# Patient Record
Sex: FEMALE | Race: WHITE | NOT HISPANIC OR LATINO
[De-identification: names, ages, dates, MRNs, and addresses within clinical notes are randomized per-mention and may not be internally consistent; named-entity substitution may affect disease eponyms.]

---

## 2017-09-28 ENCOUNTER — APPOINTMENT (OUTPATIENT)
Dept: DERMATOLOGY | Facility: CLINIC | Age: 62
End: 2017-09-28
Payer: COMMERCIAL

## 2017-09-28 PROCEDURE — 99213 OFFICE O/P EST LOW 20 MIN: CPT

## 2017-12-05 ENCOUNTER — APPOINTMENT (OUTPATIENT)
Dept: DERMATOLOGY | Facility: CLINIC | Age: 62
End: 2017-12-05

## 2018-01-23 ENCOUNTER — APPOINTMENT (OUTPATIENT)
Dept: PULMONOLOGY | Facility: CLINIC | Age: 63
End: 2018-01-23
Payer: COMMERCIAL

## 2018-01-23 VITALS
BODY MASS INDEX: 38.32 KG/M2 | OXYGEN SATURATION: 96 % | RESPIRATION RATE: 14 BRPM | SYSTOLIC BLOOD PRESSURE: 136 MMHG | HEIGHT: 65 IN | DIASTOLIC BLOOD PRESSURE: 80 MMHG | WEIGHT: 230 LBS | HEART RATE: 88 BPM

## 2018-01-23 DIAGNOSIS — N63.0 UNSPECIFIED LUMP IN UNSPECIFIED BREAST: ICD-10-CM

## 2018-01-23 DIAGNOSIS — Z87.19 PERSONAL HISTORY OF OTHER DISEASES OF THE DIGESTIVE SYSTEM: ICD-10-CM

## 2018-01-23 DIAGNOSIS — Z87.898 PERSONAL HISTORY OF OTHER SPECIFIED CONDITIONS: ICD-10-CM

## 2018-01-23 DIAGNOSIS — Z86.018 PERSONAL HISTORY OF OTHER BENIGN NEOPLASM: ICD-10-CM

## 2018-01-23 PROCEDURE — 85018 HEMOGLOBIN: CPT | Mod: QW

## 2018-01-23 PROCEDURE — 99204 OFFICE O/P NEW MOD 45 MIN: CPT | Mod: 25

## 2018-01-23 PROCEDURE — 94729 DIFFUSING CAPACITY: CPT

## 2018-01-23 PROCEDURE — 94010 BREATHING CAPACITY TEST: CPT

## 2018-01-23 PROCEDURE — 94727 GAS DIL/WSHOT DETER LNG VOL: CPT

## 2018-01-23 RX ORDER — AMOXICILLIN 500 MG/1
500 CAPSULE ORAL
Qty: 21 | Refills: 0 | Status: DISCONTINUED | COMMUNITY
Start: 2017-07-25

## 2018-01-23 RX ORDER — PREDNISONE 20 MG/1
20 TABLET ORAL
Qty: 11 | Refills: 0 | Status: DISCONTINUED | COMMUNITY
Start: 2018-01-13

## 2018-01-23 RX ORDER — CEFPODOXIME PROXETIL 200 MG/1
200 TABLET, FILM COATED ORAL
Qty: 20 | Refills: 0 | Status: DISCONTINUED | COMMUNITY
Start: 2017-10-25

## 2018-01-23 RX ORDER — CLINDAMYCIN HYDROCHLORIDE 300 MG/1
300 CAPSULE ORAL
Qty: 30 | Refills: 0 | Status: DISCONTINUED | COMMUNITY
Start: 2017-11-02

## 2018-01-23 RX ORDER — DIFLUNISAL 500 MG/1
500 TABLET, FILM COATED ORAL
Qty: 20 | Refills: 0 | Status: DISCONTINUED | COMMUNITY
Start: 2017-07-26

## 2018-01-23 RX ORDER — ALBUTEROL SULFATE 2.5 MG/3ML
(2.5 MG/3ML) SOLUTION RESPIRATORY (INHALATION)
Qty: 300 | Refills: 0 | Status: ACTIVE | COMMUNITY
Start: 2017-12-28

## 2018-01-23 RX ORDER — CLARITHROMYCIN 500 MG/1
500 TABLET, FILM COATED ORAL
Qty: 20 | Refills: 0 | Status: DISCONTINUED | COMMUNITY
Start: 2017-12-17

## 2018-01-23 RX ORDER — AZITHROMYCIN 500 MG/1
500 TABLET, FILM COATED ORAL
Qty: 7 | Refills: 0 | Status: DISCONTINUED | COMMUNITY
Start: 2017-12-20

## 2018-01-23 RX ORDER — VENLAFAXINE HYDROCHLORIDE 75 MG/1
75 CAPSULE, EXTENDED RELEASE ORAL
Refills: 0 | Status: ACTIVE | COMMUNITY
Start: 2018-01-23

## 2018-01-23 RX ORDER — OLMESARTAN MEDOXOMIL 20 MG/1
20 TABLET, FILM COATED ORAL
Refills: 0 | Status: ACTIVE | COMMUNITY
Start: 2018-01-23

## 2018-01-23 RX ORDER — MOXIFLOXACIN HYDROCHLORIDE TABLETS, 400 MG 400 MG/1
400 TABLET, FILM COATED ORAL
Qty: 10 | Refills: 0 | Status: DISCONTINUED | COMMUNITY
Start: 2017-12-28

## 2018-01-23 RX ORDER — AMOXICILLIN AND CLAVULANATE POTASSIUM 875; 125 MG/1; MG/1
875-125 TABLET, COATED ORAL
Qty: 20 | Refills: 0 | Status: DISCONTINUED | COMMUNITY
Start: 2018-01-03

## 2018-01-23 RX ORDER — FLUCONAZOLE 100 MG/1
100 TABLET ORAL
Qty: 6 | Refills: 0 | Status: DISCONTINUED | COMMUNITY
Start: 2018-01-03

## 2018-01-23 RX ORDER — METHYLPREDNISOLONE 4 MG/1
4 TABLET ORAL
Qty: 21 | Refills: 0 | Status: DISCONTINUED | COMMUNITY
Start: 2017-11-02

## 2018-01-23 RX ORDER — FLUCONAZOLE 150 MG/1
150 TABLET ORAL
Qty: 2 | Refills: 0 | Status: DISCONTINUED | COMMUNITY
Start: 2017-11-03

## 2018-02-27 ENCOUNTER — OUTPATIENT (OUTPATIENT)
Dept: OUTPATIENT SERVICES | Facility: HOSPITAL | Age: 63
LOS: 1 days | End: 2018-02-27
Payer: COMMERCIAL

## 2018-02-27 DIAGNOSIS — Z90.710 ACQUIRED ABSENCE OF BOTH CERVIX AND UTERUS: Chronic | ICD-10-CM

## 2018-02-27 DIAGNOSIS — Z98.89 OTHER SPECIFIED POSTPROCEDURAL STATES: Chronic | ICD-10-CM

## 2018-02-27 DIAGNOSIS — Z90.49 ACQUIRED ABSENCE OF OTHER SPECIFIED PARTS OF DIGESTIVE TRACT: Chronic | ICD-10-CM

## 2018-02-27 DIAGNOSIS — G47.33 OBSTRUCTIVE SLEEP APNEA (ADULT) (PEDIATRIC): ICD-10-CM

## 2018-02-27 PROCEDURE — 95806 SLEEP STUDY UNATT&RESP EFFT: CPT | Mod: 26

## 2018-02-27 PROCEDURE — G0399: CPT

## 2018-02-27 PROCEDURE — 95806 SLEEP STUDY UNATT&RESP EFFT: CPT

## 2018-03-13 ENCOUNTER — RX RENEWAL (OUTPATIENT)
Age: 63
End: 2018-03-13

## 2018-04-11 ENCOUNTER — APPOINTMENT (OUTPATIENT)
Dept: PULMONOLOGY | Facility: CLINIC | Age: 63
End: 2018-04-11
Payer: COMMERCIAL

## 2018-04-11 VITALS
BODY MASS INDEX: 38.11 KG/M2 | OXYGEN SATURATION: 96 % | SYSTOLIC BLOOD PRESSURE: 138 MMHG | DIASTOLIC BLOOD PRESSURE: 84 MMHG | HEART RATE: 77 BPM | WEIGHT: 229 LBS

## 2018-04-11 PROCEDURE — 99214 OFFICE O/P EST MOD 30 MIN: CPT

## 2018-04-11 RX ORDER — PREDNISONE 10 MG/1
10 TABLET ORAL DAILY
Qty: 100 | Refills: 0 | Status: DISCONTINUED | COMMUNITY
Start: 2018-01-23 | End: 2018-04-11

## 2018-04-11 RX ORDER — PREDNISONE 10 MG/1
10 TABLET ORAL
Qty: 34 | Refills: 0 | Status: DISCONTINUED | COMMUNITY
Start: 2017-12-22 | End: 2018-04-11

## 2018-09-26 ENCOUNTER — OUTPATIENT (OUTPATIENT)
Dept: OUTPATIENT SERVICES | Facility: HOSPITAL | Age: 63
LOS: 1 days | End: 2018-09-26
Payer: COMMERCIAL

## 2018-09-26 DIAGNOSIS — G47.33 OBSTRUCTIVE SLEEP APNEA (ADULT) (PEDIATRIC): ICD-10-CM

## 2018-09-26 DIAGNOSIS — Z90.49 ACQUIRED ABSENCE OF OTHER SPECIFIED PARTS OF DIGESTIVE TRACT: Chronic | ICD-10-CM

## 2018-09-26 DIAGNOSIS — Z90.710 ACQUIRED ABSENCE OF BOTH CERVIX AND UTERUS: Chronic | ICD-10-CM

## 2018-09-26 DIAGNOSIS — Z98.89 OTHER SPECIFIED POSTPROCEDURAL STATES: Chronic | ICD-10-CM

## 2018-09-26 PROCEDURE — 95811 POLYSOM 6/>YRS CPAP 4/> PARM: CPT

## 2018-09-26 PROCEDURE — 95811 POLYSOM 6/>YRS CPAP 4/> PARM: CPT | Mod: 26

## 2019-01-22 ENCOUNTER — APPOINTMENT (OUTPATIENT)
Dept: DERMATOLOGY | Facility: CLINIC | Age: 64
End: 2019-01-22
Payer: COMMERCIAL

## 2019-01-22 PROCEDURE — 99214 OFFICE O/P EST MOD 30 MIN: CPT | Mod: 25

## 2019-01-22 PROCEDURE — 17000 DESTRUCT PREMALG LESION: CPT

## 2019-01-28 ENCOUNTER — APPOINTMENT (OUTPATIENT)
Dept: PULMONOLOGY | Facility: CLINIC | Age: 64
End: 2019-01-28
Payer: COMMERCIAL

## 2019-01-28 VITALS
DIASTOLIC BLOOD PRESSURE: 76 MMHG | WEIGHT: 210 LBS | OXYGEN SATURATION: 97 % | HEIGHT: 65 IN | SYSTOLIC BLOOD PRESSURE: 124 MMHG | HEART RATE: 74 BPM | BODY MASS INDEX: 34.99 KG/M2

## 2019-01-28 DIAGNOSIS — K21.9 GASTRO-ESOPHAGEAL REFLUX DISEASE W/OUT ESOPHAGITIS: ICD-10-CM

## 2019-01-28 PROCEDURE — 99214 OFFICE O/P EST MOD 30 MIN: CPT

## 2019-01-28 RX ORDER — OMEPRAZOLE MAGNESIUM 20 MG/1
20 CAPSULE, DELAYED RELEASE ORAL
Refills: 0 | Status: ACTIVE | COMMUNITY

## 2019-01-28 NOTE — REASON FOR VISIT
[Follow-Up] : a follow-up visit [Shortness of Breath] : shortness of Breath [Sleep Apnea] : sleep apnea

## 2019-01-28 NOTE — HISTORY OF PRESENT ILLNESS
[Excessive Daytime Sleepiness] : excessive daytime sleepiness [Snoring] : snoring [Sleepy When Sedentary] : sleepy when sedentary [None] : The patient is not currently being treated for this problem [Follow-Up - Routine Clinic] : a routine clinic follow-up of [Excess Weight] : excess weight [Currently Experiencing] : The patient is currently experiencing symptoms. [Dyspnea] : dyspnea [Low Calorie Diet] : low calorie diet [Fair Compliance] : fair compliance with treatment [Fair Tolerance] : fair tolerance of treatment [Poor Symptom Control] : poor symptom control [Diabetes] : diabetes [Hypertension] : hypertension [High] : high [Low Calorie] : low calorie [Well Balanced Diet] : well balanced meals [Infrequently] : exercises infrequently [Walking] : walking [FreeTextEntry1] : She reports a cough treated with multiple abx, cough medicine, Singulair, Breo, Protonix, Flonase. She reports some improvement but without complete resolution. She had a sinus CT which revealed a deviated nasal septum and maxillary sinusitis. Chest CT was reported clear.\par Patient reports being on Pepcid for possible reflux and reports a significantly improved cough. She reports residual PNDS but without significant other symptoms. [Witnessed Apnea During Sleep] : no witnessed apnea during sleep [Witnessed Gasping During Sleep] : no witnessed gasping during sleep [Unrefreshing Sleep] : no unrefreshing sleep [Morning Headaches] : no morning headaches

## 2019-01-28 NOTE — REVIEW OF SYSTEMS
[Postnasal Drip] : postnasal drip [Sinus Problems] : sinus problems [Cough] : cough [Sputum] : sputum  [Hypertension] : ~T hypertension [Heartburn] : heartburn [Diabetes] : diabetes mellitus [Snoring] : snoring [Fever] : no fever [Chills] : no chills [Dry Eyes] : no dryness of the eyes [Eye Irritation] : no ~T irritation of the eyes [Nasal Congestion] : no nasal congestion [Epistaxis] : no nosebleeds [Dyspnea] : no dyspnea [Chest Tightness] : no chest tightness [Pleuritic Pain] : no pleuritic pain [Wheezing] : no wheezing [Chest Discomfort] : no chest discomfort [Dysrhythmia] : no dysrhythmia [Murmurs] : no murmurs were heard [Palpitations] : no palpitations [Edema] : ~T edema was not present [Hay Fever] : no hay fever [Itchy Eyes] : no itching of ~T the eyes [Reflux] : no reflux [Nausea] : no nausea [Vomiting] : no vomiting [Constipation] : no constipation [Diarrhea] : no diarrhea [Abdominal Pain] : no abdominal pain [Dysuria] : no dysuria [Trauma] : no ~T physical trauma [Fracture] : no fracture [Anemia] : no anemia [Headache] : no headache [Dizziness] : no dizziness [Syncope] : no fainting [Numbness] : no numbness [Paralysis] : no paralysis was seen [Seizures] : no seizures [Depression] : no depression [Anxiety] : no anxiety [Thyroid Problem] : no thyroid problem [Witnessed Apneas] : demonstrated no ~M apnea [Nonrestorative Sleep] : restorative sleep

## 2019-01-28 NOTE — DISCUSSION/SUMMARY
[FreeTextEntry1] : \par #1. PFTs performed previously was essentially normal.\par #2. SOBOE is likely related to weight or deconditioning given normal PFTs\par #3. The patient does not appear to require BD therapy at this time\par #4. Diet and exercise for weight loss\par #5. CPAP titration study for moderate KATIUSKA\par #6. Cough improved with Zantac for GERD\par #7. Flonase for PNDS which could be contributing to her cough\par #8. F/u one month after starting CPAP therapy

## 2019-01-28 NOTE — CONSULT LETTER
[Dear  ___] : Dear  [unfilled], [Consult Letter:] : I had the pleasure of evaluating your patient, [unfilled]. [Please see my note below.] : Please see my note below. [Consult Closing:] : Thank you very much for allowing me to participate in the care of this patient.  If you have any questions, please do not hesitate to contact me. [Sincerely,] : Sincerely, [DrHeide  ___] : Dr. MG [Fawad Diaz MD] : Fawad Diaz MD [FreeTextEntry3] : Fawad Diaz MD, FCCP, PAMELA. ABSM\par

## 2019-01-28 NOTE — PHYSICAL EXAM
[General Appearance - Well Developed] : well developed [Normal Appearance] : normal appearance [General Appearance - In No Acute Distress] : no acute distress [Normal Conjunctiva] : the conjunctiva exhibited no abnormalities [Low Lying Soft Palate] : low lying soft palate [Enlarged Base of the Tongue] : enlargement of the base of the tongue [II] : II [Neck Appearance] : the appearance of the neck was normal [Heart Rate And Rhythm] : heart rate and rhythm were normal [Heart Sounds] : normal S1 and S2 [Murmurs] : no murmurs present [Edema] : no peripheral edema present [] : no respiratory distress [Respiration, Rhythm And Depth] : normal respiratory rhythm and effort [Exaggerated Use Of Accessory Muscles For Inspiration] : no accessory muscle use [Auscultation Breath Sounds / Voice Sounds] : lungs were clear to auscultation bilaterally [Abdomen Soft] : soft [Abdomen Tenderness] : non-tender [Abnormal Walk] : normal gait [Nail Clubbing] : no clubbing of the fingernails [Cyanosis, Localized] : no localized cyanosis [No Focal Deficits] : no focal deficits [Oriented To Time, Place, And Person] : oriented to person, place, and time [Elongated Uvula] : no elongated uvula [FreeTextEntry1] : No abnormalities.

## 2019-02-04 ENCOUNTER — APPOINTMENT (OUTPATIENT)
Dept: PULMONOLOGY | Facility: CLINIC | Age: 64
End: 2019-02-04

## 2019-03-05 ENCOUNTER — APPOINTMENT (OUTPATIENT)
Dept: SURGERY | Facility: CLINIC | Age: 64
End: 2019-03-05
Payer: COMMERCIAL

## 2019-03-05 VITALS
HEIGHT: 66 IN | SYSTOLIC BLOOD PRESSURE: 141 MMHG | BODY MASS INDEX: 33.75 KG/M2 | HEART RATE: 88 BPM | WEIGHT: 210 LBS | DIASTOLIC BLOOD PRESSURE: 90 MMHG

## 2019-03-05 PROCEDURE — 99214 OFFICE O/P EST MOD 30 MIN: CPT

## 2019-03-07 NOTE — HISTORY OF PRESENT ILLNESS
[de-identified] : Presents today with concern of increasing bulge in the midepigastrium. Concerned of recurrent herniation.

## 2019-03-07 NOTE — PHYSICAL EXAM
[Normal Breath Sounds] : Normal breath sounds [Normal Heart Sounds] : normal heart sounds [Normal Rate and Rhythm] : normal rate and rhythm [No Rash or Lesion] : No rash or lesion [Alert] : alert [Oriented to Person] : oriented to person [Oriented to Place] : oriented to place [Oriented to Time] : oriented to time [Calm] : calm [de-identified] : Healthy-appearing 58-year-old woman in no acute distress. [de-identified] : NCAT, PERRLA, EOMI. Oral and pharyngeal mucosa pink and moist [de-identified] : Supple, no masses, no lymphadenopathy, no jugular venous distention. [de-identified] : Soft, nontender nondistended, positive bowel sounds in all four quads.  No hernia or masses. No rebound or guarding.\par Moderate epigastric diastasis. [de-identified] : FROM, strength equal bilaterally ambulating without difficulty.

## 2019-03-07 NOTE — ASSESSMENT
[FreeTextEntry1] : Increased bowing of mid epigastrium.  Appears to be enlarging diastasis.   No obvious signs of hernia or mass.  \par Will obtain CT to r/o recurrent ventral hernia.\par F/u upon completion of CT scan as needed.  Will call to discuss results.

## 2019-03-11 ENCOUNTER — APPOINTMENT (OUTPATIENT)
Dept: PULMONOLOGY | Facility: CLINIC | Age: 64
End: 2019-03-11
Payer: COMMERCIAL

## 2019-03-11 VITALS
HEART RATE: 67 BPM | SYSTOLIC BLOOD PRESSURE: 122 MMHG | BODY MASS INDEX: 34.38 KG/M2 | OXYGEN SATURATION: 98 % | WEIGHT: 213 LBS | DIASTOLIC BLOOD PRESSURE: 82 MMHG

## 2019-03-11 DIAGNOSIS — R09.82 POSTNASAL DRIP: ICD-10-CM

## 2019-03-11 DIAGNOSIS — R06.02 SHORTNESS OF BREATH: ICD-10-CM

## 2019-03-11 PROCEDURE — 99214 OFFICE O/P EST MOD 30 MIN: CPT

## 2019-03-11 RX ORDER — TERCONAZOLE 4 MG/G
0.4 CREAM VAGINAL
Qty: 45 | Refills: 0 | Status: DISCONTINUED | COMMUNITY
Start: 2017-12-22 | End: 2019-03-11

## 2019-03-11 RX ORDER — MONTELUKAST 10 MG/1
10 TABLET, FILM COATED ORAL
Qty: 30 | Refills: 0 | Status: DISCONTINUED | COMMUNITY
Start: 2017-12-20 | End: 2019-03-11

## 2019-03-11 RX ORDER — HYDROCODONE POLISTIREX AND CHLORPHENIRAMINE POLISTIREX 10; 8 MG/5ML; MG/5ML
10-8 SUSPENSION, EXTENDED RELEASE ORAL
Qty: 300 | Refills: 0 | Status: DISCONTINUED | COMMUNITY
Start: 2017-12-19 | End: 2019-03-11

## 2019-03-11 RX ORDER — FLUTICASONE PROPIONATE 50 UG/1
50 SPRAY, METERED NASAL
Qty: 16 | Refills: 0 | Status: DISCONTINUED | COMMUNITY
Start: 2018-01-13 | End: 2019-03-11

## 2019-03-11 RX ORDER — ALBUTEROL SULFATE 90 UG/1
108 (90 BASE) AEROSOL, METERED RESPIRATORY (INHALATION)
Qty: 9 | Refills: 0 | Status: DISCONTINUED | COMMUNITY
Start: 2017-12-20 | End: 2019-03-11

## 2019-03-11 RX ORDER — CIPROFLOXACIN AND DEXAMETHASONE 3; 1 MG/ML; MG/ML
0.3-0.1 SUSPENSION/ DROPS AURICULAR (OTIC)
Qty: 8 | Refills: 0 | Status: DISCONTINUED | COMMUNITY
Start: 2017-12-28 | End: 2019-03-11

## 2019-03-11 RX ORDER — BENZONATATE 100 MG/1
100 CAPSULE ORAL
Qty: 30 | Refills: 0 | Status: DISCONTINUED | COMMUNITY
Start: 2017-12-19 | End: 2019-03-11

## 2019-03-11 RX ORDER — EMPAGLIFLOZIN 25 MG/1
25 TABLET, FILM COATED ORAL
Qty: 30 | Refills: 0 | Status: DISCONTINUED | COMMUNITY
Start: 2018-01-13 | End: 2019-03-11

## 2019-03-11 NOTE — DISCUSSION/SUMMARY
[FreeTextEntry1] : \par #1. PFTs performed previously was essentially normal.\par #2. SOBOE is likely related to weight or deconditioning given normal PFTs\par #3. The patient does not appear to require BD therapy at this time\par #4. Diet and exercise for weight loss\par #5. CPAP at 8 cm of water to treat moderate KATIUSKA\par #6. Cough improved with Zantac for GERD\par #7. Flonase for PNDS which could be contributing to her cough\par #8. F/u one month to reassess response to new mask; ordered AirFit P20 and gave P10 to pt

## 2019-03-18 ENCOUNTER — INPATIENT (INPATIENT)
Facility: HOSPITAL | Age: 64
LOS: 13 days | Discharge: HOME | DRG: 330 | End: 2019-04-01
Attending: SURGERY | Admitting: SURGERY
Payer: COMMERCIAL

## 2019-03-18 VITALS
HEIGHT: 67 IN | TEMPERATURE: 98 F | DIASTOLIC BLOOD PRESSURE: 92 MMHG | RESPIRATION RATE: 20 BRPM | SYSTOLIC BLOOD PRESSURE: 158 MMHG | WEIGHT: 210.1 LBS | OXYGEN SATURATION: 99 % | HEART RATE: 82 BPM

## 2019-03-18 DIAGNOSIS — Z98.89 OTHER SPECIFIED POSTPROCEDURAL STATES: Chronic | ICD-10-CM

## 2019-03-18 DIAGNOSIS — Z90.710 ACQUIRED ABSENCE OF BOTH CERVIX AND UTERUS: Chronic | ICD-10-CM

## 2019-03-18 DIAGNOSIS — Z90.49 ACQUIRED ABSENCE OF OTHER SPECIFIED PARTS OF DIGESTIVE TRACT: Chronic | ICD-10-CM

## 2019-03-18 LAB
ALBUMIN SERPL ELPH-MCNC: 4.3 G/DL — SIGNIFICANT CHANGE UP (ref 3.3–5.2)
ALP SERPL-CCNC: 102 U/L — SIGNIFICANT CHANGE UP (ref 40–120)
ALT FLD-CCNC: 18 U/L — SIGNIFICANT CHANGE UP
ANION GAP SERPL CALC-SCNC: 22 MMOL/L — HIGH (ref 5–17)
APTT BLD: 35.1 SEC — SIGNIFICANT CHANGE UP (ref 27.5–36.3)
AST SERPL-CCNC: 21 U/L — SIGNIFICANT CHANGE UP
BASOPHILS # BLD AUTO: 0 K/UL — SIGNIFICANT CHANGE UP (ref 0–0.2)
BASOPHILS NFR BLD AUTO: 0.2 % — SIGNIFICANT CHANGE UP (ref 0–2)
BILIRUB SERPL-MCNC: 0.5 MG/DL — SIGNIFICANT CHANGE UP (ref 0.4–2)
BUN SERPL-MCNC: 16 MG/DL — SIGNIFICANT CHANGE UP (ref 8–20)
CALCIUM SERPL-MCNC: 10.3 MG/DL — HIGH (ref 8.6–10.2)
CHLORIDE SERPL-SCNC: 100 MMOL/L — SIGNIFICANT CHANGE UP (ref 98–107)
CO2 SERPL-SCNC: 18 MMOL/L — LOW (ref 22–29)
CREAT SERPL-MCNC: 0.76 MG/DL — SIGNIFICANT CHANGE UP (ref 0.5–1.3)
EOSINOPHIL # BLD AUTO: 0 K/UL — SIGNIFICANT CHANGE UP (ref 0–0.5)
EOSINOPHIL NFR BLD AUTO: 0.2 % — SIGNIFICANT CHANGE UP (ref 0–6)
GLUCOSE SERPL-MCNC: 211 MG/DL — HIGH (ref 70–115)
HCT VFR BLD CALC: 47.3 % — HIGH (ref 37–47)
HGB BLD-MCNC: 16.2 G/DL — HIGH (ref 12–16)
INR BLD: 0.94 RATIO — SIGNIFICANT CHANGE UP (ref 0.88–1.16)
LIDOCAIN IGE QN: 23 U/L — SIGNIFICANT CHANGE UP (ref 22–51)
LYMPHOCYTES # BLD AUTO: 1.8 K/UL — SIGNIFICANT CHANGE UP (ref 1–4.8)
LYMPHOCYTES # BLD AUTO: 11.3 % — LOW (ref 20–55)
MCHC RBC-ENTMCNC: 30.2 PG — SIGNIFICANT CHANGE UP (ref 27–31)
MCHC RBC-ENTMCNC: 34.2 G/DL — SIGNIFICANT CHANGE UP (ref 32–36)
MCV RBC AUTO: 88.2 FL — SIGNIFICANT CHANGE UP (ref 81–99)
MONOCYTES # BLD AUTO: 0.8 K/UL — SIGNIFICANT CHANGE UP (ref 0–0.8)
MONOCYTES NFR BLD AUTO: 5 % — SIGNIFICANT CHANGE UP (ref 3–10)
NEUTROPHILS # BLD AUTO: 13.3 K/UL — HIGH (ref 1.8–8)
NEUTROPHILS NFR BLD AUTO: 82.9 % — HIGH (ref 37–73)
PLATELET # BLD AUTO: 410 K/UL — HIGH (ref 150–400)
POTASSIUM SERPL-MCNC: 3.8 MMOL/L — SIGNIFICANT CHANGE UP (ref 3.5–5.3)
POTASSIUM SERPL-SCNC: 3.8 MMOL/L — SIGNIFICANT CHANGE UP (ref 3.5–5.3)
PROT SERPL-MCNC: 8.4 G/DL — SIGNIFICANT CHANGE UP (ref 6.6–8.7)
PROTHROM AB SERPL-ACNC: 10.8 SEC — SIGNIFICANT CHANGE UP (ref 10–12.9)
RBC # BLD: 5.36 M/UL — HIGH (ref 4.4–5.2)
RBC # FLD: 13.9 % — SIGNIFICANT CHANGE UP (ref 11–15.6)
SODIUM SERPL-SCNC: 140 MMOL/L — SIGNIFICANT CHANGE UP (ref 135–145)
TYPE + AB SCN PNL BLD: SIGNIFICANT CHANGE UP
WBC # BLD: 16.1 K/UL — HIGH (ref 4.8–10.8)
WBC # FLD AUTO: 16.1 K/UL — HIGH (ref 4.8–10.8)

## 2019-03-18 PROCEDURE — 99285 EMERGENCY DEPT VISIT HI MDM: CPT

## 2019-03-18 PROCEDURE — 74177 CT ABD & PELVIS W/CONTRAST: CPT | Mod: 26

## 2019-03-18 PROCEDURE — 93010 ELECTROCARDIOGRAM REPORT: CPT

## 2019-03-18 RX ORDER — SODIUM CHLORIDE 9 MG/ML
3 INJECTION INTRAMUSCULAR; INTRAVENOUS; SUBCUTANEOUS ONCE
Qty: 0 | Refills: 0 | Status: COMPLETED | OUTPATIENT
Start: 2019-03-18 | End: 2019-03-18

## 2019-03-18 RX ORDER — ONDANSETRON 8 MG/1
4 TABLET, FILM COATED ORAL ONCE
Qty: 0 | Refills: 0 | Status: COMPLETED | OUTPATIENT
Start: 2019-03-18 | End: 2019-03-18

## 2019-03-18 RX ORDER — HYDROMORPHONE HYDROCHLORIDE 2 MG/ML
0.5 INJECTION INTRAMUSCULAR; INTRAVENOUS; SUBCUTANEOUS ONCE
Qty: 0 | Refills: 0 | Status: DISCONTINUED | OUTPATIENT
Start: 2019-03-18 | End: 2019-03-18

## 2019-03-18 RX ORDER — SODIUM CHLORIDE 9 MG/ML
1000 INJECTION INTRAMUSCULAR; INTRAVENOUS; SUBCUTANEOUS ONCE
Qty: 0 | Refills: 0 | Status: COMPLETED | OUTPATIENT
Start: 2019-03-18 | End: 2019-03-18

## 2019-03-18 RX ADMIN — SODIUM CHLORIDE 1000 MILLILITER(S): 9 INJECTION INTRAMUSCULAR; INTRAVENOUS; SUBCUTANEOUS at 21:01

## 2019-03-18 RX ADMIN — SODIUM CHLORIDE 1000 MILLILITER(S): 9 INJECTION INTRAMUSCULAR; INTRAVENOUS; SUBCUTANEOUS at 22:21

## 2019-03-18 RX ADMIN — ONDANSETRON 4 MILLIGRAM(S): 8 TABLET, FILM COATED ORAL at 22:52

## 2019-03-18 RX ADMIN — HYDROMORPHONE HYDROCHLORIDE 0.5 MILLIGRAM(S): 2 INJECTION INTRAMUSCULAR; INTRAVENOUS; SUBCUTANEOUS at 21:21

## 2019-03-18 RX ADMIN — SODIUM CHLORIDE 3 MILLILITER(S): 9 INJECTION INTRAMUSCULAR; INTRAVENOUS; SUBCUTANEOUS at 22:43

## 2019-03-18 RX ADMIN — ONDANSETRON 4 MILLIGRAM(S): 8 TABLET, FILM COATED ORAL at 21:01

## 2019-03-18 RX ADMIN — HYDROMORPHONE HYDROCHLORIDE 0.5 MILLIGRAM(S): 2 INJECTION INTRAMUSCULAR; INTRAVENOUS; SUBCUTANEOUS at 21:01

## 2019-03-18 RX ADMIN — HYDROMORPHONE HYDROCHLORIDE 0.5 MILLIGRAM(S): 2 INJECTION INTRAMUSCULAR; INTRAVENOUS; SUBCUTANEOUS at 22:48

## 2019-03-18 NOTE — ED PROVIDER NOTE - CLINICAL SUMMARY MEDICAL DECISION MAKING FREE TEXT BOX
pt with diffuse abd pain, n/v.  will check labs, ct, cxr, ekg, give pain medication, ivf, zofran and reassess.

## 2019-03-18 NOTE — ED ADULT NURSE NOTE - NSIMPLEMENTINTERV_GEN_ALL_ED
Implemented All Universal Safety Interventions:  [place] to call system. Call bell, personal items and telephone within reach. Instruct patient to call for assistance. Room bathroom lighting operational. Non-slip footwear when patient is off stretcher. Physically safe environment: no spills, clutter or unnecessary equipment. Stretcher in lowest position, wheels locked, appropriate side rails in place.

## 2019-03-18 NOTE — ED ADULT NURSE NOTE - OBJECTIVE STATEMENT
Assumed care of patient at 2100, alert and oriented x4, Pt vomiting, c/o 10/10 lower abdominal pain since 10 am this am. Pt diaphoretic, EKG being done at bedside. Pt reports she felt like she was constipated so she took metamucil this am and has been vomiting. Denies diarrhea, denies chest pains. Abdomen soft, slightly distended, c/o pain to lower abdomen. IV placed labs sent medications given for pain with relief Assumed care of patient at 2100, alert and oriented x4, Pt vomiting, c/o 10/10 lower abdominal pain since 10 am this am. Pt diaphoretic, EKG being done at bedside. Pt reports she felt like she was constipated so she took metamucil this am and has been vomiting. Denies diarrhea, denies chest pains. Abdomen soft, slightly distended, c/o pain to lower abdomen. IV placed labs sent medications given for pain with relief, pt educated on plan of care, pt able to successfully teach back plan of care to RN, RN will continue to reeducate pt during hospital stay.

## 2019-03-18 NOTE — ED PROVIDER NOTE - OBJECTIVE STATEMENT
Pt is a 64 yo F co abd pain and vomiting. PMHx significant for DM, hld and multiple abdominal surgeries.  Pt states that this morning at 10 am she had onset of diffuse abdominal pain. pt states that she took metamucil and after that the pain became worse and she had many episodes of vomiting. Pt states that she has been unable to keep anything down at all. pt states that she had a normal BM this morning but nothing since then and is not passing flatus. no fever/chills. no cough. no sob. no other complaints.

## 2019-03-18 NOTE — ED PROVIDER NOTE - NS ED ROS FT
No fever/chills, No photophobia/eye pain/changes in vision, No ear pain/sore throat/dysphagia, No chest pain/palpitations, no SOB/cough/wheeze/stridor, +abdominal pain, No Diarrhea, no dysuria/frequency/discharge, No neck/back pain, no rash, no changes in neurological status/function.  +n/v

## 2019-03-18 NOTE — ED ADULT NURSE NOTE - PSH
S/P breast lumpectomy  (Benign) right breast 2000  S/P exploratory laparotomy  s/p tummy tuck for post op bleeding 2014  S/P hernia repair  "Temporary ventral hernia surgery" (1/5/15)  S/P total hysterectomy with removal of both tubes and ovaries  March 2013  Status post abdominoplasty  with hernia repair April 2014  Status post cholecystectomy  01/05/2015

## 2019-03-18 NOTE — ED PROVIDER NOTE - PHYSICAL EXAMINATION
Constitutional - well-developed; well nourished. moderate distress 2/2 pain. Head - NCAT. Airway patent. Eyes - PERRL. CV - RRR. no murmur. no edema. Pulm - CTAB. Abd - soft, diffuse ttp. no rebound. no guarding. Neuro - A&Ox3. strength 5/5 x4. sensation intact x4. normal gait. Skin - No rash. MSK - normal ROM.

## 2019-03-19 DIAGNOSIS — K56.609 UNSPECIFIED INTESTINAL OBSTRUCTION, UNSPECIFIED AS TO PARTIAL VERSUS COMPLETE OBSTRUCTION: ICD-10-CM

## 2019-03-19 LAB
ACETONE SERPL-MCNC: ABNORMAL
ANION GAP SERPL CALC-SCNC: 18 MMOL/L — HIGH (ref 5–17)
ANION GAP SERPL CALC-SCNC: 18 MMOL/L — HIGH (ref 5–17)
APPEARANCE UR: CLEAR — SIGNIFICANT CHANGE UP
BASOPHILS # BLD AUTO: 0 K/UL — SIGNIFICANT CHANGE UP (ref 0–0.2)
BASOPHILS NFR BLD AUTO: 0.1 % — SIGNIFICANT CHANGE UP (ref 0–2)
BILIRUB UR-MCNC: NEGATIVE — SIGNIFICANT CHANGE UP
BUN SERPL-MCNC: 20 MG/DL — SIGNIFICANT CHANGE UP (ref 8–20)
BUN SERPL-MCNC: 22 MG/DL — HIGH (ref 8–20)
CALCIUM SERPL-MCNC: 9.5 MG/DL — SIGNIFICANT CHANGE UP (ref 8.6–10.2)
CALCIUM SERPL-MCNC: 9.7 MG/DL — SIGNIFICANT CHANGE UP (ref 8.6–10.2)
CHLORIDE SERPL-SCNC: 101 MMOL/L — SIGNIFICANT CHANGE UP (ref 98–107)
CHLORIDE SERPL-SCNC: 102 MMOL/L — SIGNIFICANT CHANGE UP (ref 98–107)
CO2 SERPL-SCNC: 22 MMOL/L — SIGNIFICANT CHANGE UP (ref 22–29)
CO2 SERPL-SCNC: 23 MMOL/L — SIGNIFICANT CHANGE UP (ref 22–29)
COLOR SPEC: YELLOW — SIGNIFICANT CHANGE UP
CREAT SERPL-MCNC: 0.68 MG/DL — SIGNIFICANT CHANGE UP (ref 0.5–1.3)
CREAT SERPL-MCNC: 0.76 MG/DL — SIGNIFICANT CHANGE UP (ref 0.5–1.3)
DIFF PNL FLD: NEGATIVE — SIGNIFICANT CHANGE UP
EOSINOPHIL # BLD AUTO: 0 K/UL — SIGNIFICANT CHANGE UP (ref 0–0.5)
EOSINOPHIL NFR BLD AUTO: 0.1 % — SIGNIFICANT CHANGE UP (ref 0–6)
GLUCOSE BLDC GLUCOMTR-MCNC: 149 MG/DL — HIGH (ref 70–99)
GLUCOSE BLDC GLUCOMTR-MCNC: 162 MG/DL — HIGH (ref 70–99)
GLUCOSE BLDC GLUCOMTR-MCNC: 237 MG/DL — HIGH (ref 70–99)
GLUCOSE SERPL-MCNC: 177 MG/DL — HIGH (ref 70–115)
GLUCOSE SERPL-MCNC: 217 MG/DL — HIGH (ref 70–115)
GLUCOSE UR QL: 1000 MG/DL
HCT VFR BLD CALC: 47.2 % — HIGH (ref 37–47)
HGB BLD-MCNC: 15.9 G/DL — SIGNIFICANT CHANGE UP (ref 12–16)
KETONES UR-MCNC: ABNORMAL
LACTATE SERPL-SCNC: 1.7 MMOL/L — SIGNIFICANT CHANGE UP (ref 0.5–2)
LACTATE SERPL-SCNC: 2.2 MMOL/L — HIGH (ref 0.5–2)
LEUKOCYTE ESTERASE UR-ACNC: ABNORMAL
LYMPHOCYTES # BLD AUTO: 1.4 K/UL — SIGNIFICANT CHANGE UP (ref 1–4.8)
LYMPHOCYTES # BLD AUTO: 11.6 % — LOW (ref 20–55)
MAGNESIUM SERPL-MCNC: 2.1 MG/DL — SIGNIFICANT CHANGE UP (ref 1.6–2.6)
MCHC RBC-ENTMCNC: 30.2 PG — SIGNIFICANT CHANGE UP (ref 27–31)
MCHC RBC-ENTMCNC: 33.7 G/DL — SIGNIFICANT CHANGE UP (ref 32–36)
MCV RBC AUTO: 89.6 FL — SIGNIFICANT CHANGE UP (ref 81–99)
MONOCYTES # BLD AUTO: 1.2 K/UL — HIGH (ref 0–0.8)
MONOCYTES NFR BLD AUTO: 10.4 % — HIGH (ref 3–10)
NEUTROPHILS # BLD AUTO: 9.1 K/UL — HIGH (ref 1.8–8)
NEUTROPHILS NFR BLD AUTO: 77.7 % — HIGH (ref 37–73)
NITRITE UR-MCNC: NEGATIVE — SIGNIFICANT CHANGE UP
PH UR: 5 — SIGNIFICANT CHANGE UP (ref 5–8)
PHOSPHATE SERPL-MCNC: 5.5 MG/DL — HIGH (ref 2.4–4.7)
PLATELET # BLD AUTO: 450 K/UL — HIGH (ref 150–400)
POTASSIUM SERPL-MCNC: 4.1 MMOL/L — SIGNIFICANT CHANGE UP (ref 3.5–5.3)
POTASSIUM SERPL-MCNC: 4.5 MMOL/L — SIGNIFICANT CHANGE UP (ref 3.5–5.3)
POTASSIUM SERPL-SCNC: 4.1 MMOL/L — SIGNIFICANT CHANGE UP (ref 3.5–5.3)
POTASSIUM SERPL-SCNC: 4.5 MMOL/L — SIGNIFICANT CHANGE UP (ref 3.5–5.3)
PROT UR-MCNC: 30 MG/DL
RBC # BLD: 5.27 M/UL — HIGH (ref 4.4–5.2)
RBC # FLD: 14.4 % — SIGNIFICANT CHANGE UP (ref 11–15.6)
SODIUM SERPL-SCNC: 142 MMOL/L — SIGNIFICANT CHANGE UP (ref 135–145)
SODIUM SERPL-SCNC: 142 MMOL/L — SIGNIFICANT CHANGE UP (ref 135–145)
SP GR SPEC: 1.01 — SIGNIFICANT CHANGE UP (ref 1.01–1.02)
UROBILINOGEN FLD QL: NEGATIVE MG/DL — SIGNIFICANT CHANGE UP
WBC # BLD: 11.8 K/UL — HIGH (ref 4.8–10.8)
WBC # FLD AUTO: 11.8 K/UL — HIGH (ref 4.8–10.8)

## 2019-03-19 PROCEDURE — 71045 X-RAY EXAM CHEST 1 VIEW: CPT | Mod: 26

## 2019-03-19 RX ORDER — SODIUM CHLORIDE 9 MG/ML
1000 INJECTION, SOLUTION INTRAVENOUS
Qty: 0 | Refills: 0 | Status: DISCONTINUED | OUTPATIENT
Start: 2019-03-19 | End: 2019-03-23

## 2019-03-19 RX ORDER — SODIUM CHLORIDE 9 MG/ML
1000 INJECTION, SOLUTION INTRAVENOUS
Qty: 0 | Refills: 0 | Status: DISCONTINUED | OUTPATIENT
Start: 2019-03-19 | End: 2019-03-27

## 2019-03-19 RX ORDER — ACETAMINOPHEN 500 MG
1000 TABLET ORAL ONCE
Qty: 0 | Refills: 0 | Status: COMPLETED | OUTPATIENT
Start: 2019-03-19 | End: 2019-03-27

## 2019-03-19 RX ORDER — DEXTROSE 50 % IN WATER 50 %
15 SYRINGE (ML) INTRAVENOUS ONCE
Qty: 0 | Refills: 0 | Status: DISCONTINUED | OUTPATIENT
Start: 2019-03-19 | End: 2019-03-26

## 2019-03-19 RX ORDER — ENOXAPARIN SODIUM 100 MG/ML
40 INJECTION SUBCUTANEOUS EVERY 24 HOURS
Qty: 0 | Refills: 0 | Status: DISCONTINUED | OUTPATIENT
Start: 2019-03-19 | End: 2019-03-27

## 2019-03-19 RX ORDER — ONDANSETRON 8 MG/1
4 TABLET, FILM COATED ORAL ONCE
Qty: 0 | Refills: 0 | Status: COMPLETED | OUTPATIENT
Start: 2019-03-19 | End: 2019-03-19

## 2019-03-19 RX ORDER — HYDROMORPHONE HYDROCHLORIDE 2 MG/ML
0.5 INJECTION INTRAMUSCULAR; INTRAVENOUS; SUBCUTANEOUS EVERY 4 HOURS
Qty: 0 | Refills: 0 | Status: DISCONTINUED | OUTPATIENT
Start: 2019-03-19 | End: 2019-03-26

## 2019-03-19 RX ORDER — DEXTROSE 50 % IN WATER 50 %
12.5 SYRINGE (ML) INTRAVENOUS ONCE
Qty: 0 | Refills: 0 | Status: DISCONTINUED | OUTPATIENT
Start: 2019-03-19 | End: 2019-03-26

## 2019-03-19 RX ORDER — ONDANSETRON 8 MG/1
4 TABLET, FILM COATED ORAL EVERY 6 HOURS
Qty: 0 | Refills: 0 | Status: DISCONTINUED | OUTPATIENT
Start: 2019-03-19 | End: 2019-03-26

## 2019-03-19 RX ORDER — ACETAMINOPHEN 500 MG
1000 TABLET ORAL ONCE
Qty: 0 | Refills: 0 | Status: COMPLETED | OUTPATIENT
Start: 2019-03-19 | End: 2019-03-19

## 2019-03-19 RX ORDER — HYDROMORPHONE HYDROCHLORIDE 2 MG/ML
0.5 INJECTION INTRAMUSCULAR; INTRAVENOUS; SUBCUTANEOUS EVERY 6 HOURS
Qty: 0 | Refills: 0 | Status: DISCONTINUED | OUTPATIENT
Start: 2019-03-19 | End: 2019-03-19

## 2019-03-19 RX ORDER — DEXTROSE 50 % IN WATER 50 %
25 SYRINGE (ML) INTRAVENOUS ONCE
Qty: 0 | Refills: 0 | Status: DISCONTINUED | OUTPATIENT
Start: 2019-03-19 | End: 2019-03-26

## 2019-03-19 RX ORDER — GLUCAGON INJECTION, SOLUTION 0.5 MG/.1ML
1 INJECTION, SOLUTION SUBCUTANEOUS ONCE
Qty: 0 | Refills: 0 | Status: DISCONTINUED | OUTPATIENT
Start: 2019-03-19 | End: 2019-03-26

## 2019-03-19 RX ORDER — MORPHINE SULFATE 50 MG/1
2 CAPSULE, EXTENDED RELEASE ORAL EVERY 6 HOURS
Qty: 0 | Refills: 0 | Status: DISCONTINUED | OUTPATIENT
Start: 2019-03-19 | End: 2019-03-19

## 2019-03-19 RX ORDER — SODIUM CHLORIDE 9 MG/ML
1000 INJECTION, SOLUTION INTRAVENOUS ONCE
Qty: 0 | Refills: 0 | Status: COMPLETED | OUTPATIENT
Start: 2019-03-19 | End: 2019-03-19

## 2019-03-19 RX ORDER — SIMVASTATIN 20 MG/1
1 TABLET, FILM COATED ORAL
Qty: 0 | Refills: 0 | COMMUNITY

## 2019-03-19 RX ORDER — SODIUM CHLORIDE 9 MG/ML
1000 INJECTION, SOLUTION INTRAVENOUS
Qty: 0 | Refills: 0 | Status: DISCONTINUED | OUTPATIENT
Start: 2019-03-19 | End: 2019-03-19

## 2019-03-19 RX ORDER — HYDROMORPHONE HYDROCHLORIDE 2 MG/ML
0.5 INJECTION INTRAMUSCULAR; INTRAVENOUS; SUBCUTANEOUS ONCE
Qty: 0 | Refills: 0 | Status: DISCONTINUED | OUTPATIENT
Start: 2019-03-19 | End: 2019-03-19

## 2019-03-19 RX ORDER — INSULIN LISPRO 100/ML
VIAL (ML) SUBCUTANEOUS EVERY 6 HOURS
Qty: 0 | Refills: 0 | Status: DISCONTINUED | OUTPATIENT
Start: 2019-03-19 | End: 2019-03-27

## 2019-03-19 RX ADMIN — HYDROMORPHONE HYDROCHLORIDE 0.5 MILLIGRAM(S): 2 INJECTION INTRAMUSCULAR; INTRAVENOUS; SUBCUTANEOUS at 12:26

## 2019-03-19 RX ADMIN — SODIUM CHLORIDE 125 MILLILITER(S): 9 INJECTION, SOLUTION INTRAVENOUS at 07:40

## 2019-03-19 RX ADMIN — ONDANSETRON 4 MILLIGRAM(S): 8 TABLET, FILM COATED ORAL at 15:39

## 2019-03-19 RX ADMIN — ONDANSETRON 4 MILLIGRAM(S): 8 TABLET, FILM COATED ORAL at 01:09

## 2019-03-19 RX ADMIN — HYDROMORPHONE HYDROCHLORIDE 0.5 MILLIGRAM(S): 2 INJECTION INTRAMUSCULAR; INTRAVENOUS; SUBCUTANEOUS at 17:08

## 2019-03-19 RX ADMIN — ENOXAPARIN SODIUM 40 MILLIGRAM(S): 100 INJECTION SUBCUTANEOUS at 07:39

## 2019-03-19 RX ADMIN — HYDROMORPHONE HYDROCHLORIDE 0.5 MILLIGRAM(S): 2 INJECTION INTRAMUSCULAR; INTRAVENOUS; SUBCUTANEOUS at 12:56

## 2019-03-19 RX ADMIN — Medication 400 MILLIGRAM(S): at 03:24

## 2019-03-19 RX ADMIN — SODIUM CHLORIDE 125 MILLILITER(S): 9 INJECTION, SOLUTION INTRAVENOUS at 20:58

## 2019-03-19 RX ADMIN — HYDROMORPHONE HYDROCHLORIDE 0.5 MILLIGRAM(S): 2 INJECTION INTRAMUSCULAR; INTRAVENOUS; SUBCUTANEOUS at 01:09

## 2019-03-19 RX ADMIN — SODIUM CHLORIDE 125 MILLILITER(S): 9 INJECTION, SOLUTION INTRAVENOUS at 16:38

## 2019-03-19 RX ADMIN — Medication 4: at 18:49

## 2019-03-19 RX ADMIN — HYDROMORPHONE HYDROCHLORIDE 0.5 MILLIGRAM(S): 2 INJECTION INTRAMUSCULAR; INTRAVENOUS; SUBCUTANEOUS at 16:38

## 2019-03-19 RX ADMIN — SODIUM CHLORIDE 125 MILLILITER(S): 9 INJECTION, SOLUTION INTRAVENOUS at 04:59

## 2019-03-19 RX ADMIN — HYDROMORPHONE HYDROCHLORIDE 0.5 MILLIGRAM(S): 2 INJECTION INTRAMUSCULAR; INTRAVENOUS; SUBCUTANEOUS at 20:57

## 2019-03-19 RX ADMIN — Medication 2: at 08:55

## 2019-03-19 RX ADMIN — HYDROMORPHONE HYDROCHLORIDE 0.5 MILLIGRAM(S): 2 INJECTION INTRAMUSCULAR; INTRAVENOUS; SUBCUTANEOUS at 06:22

## 2019-03-19 RX ADMIN — ONDANSETRON 4 MILLIGRAM(S): 8 TABLET, FILM COATED ORAL at 20:58

## 2019-03-19 RX ADMIN — HYDROMORPHONE HYDROCHLORIDE 0.5 MILLIGRAM(S): 2 INJECTION INTRAMUSCULAR; INTRAVENOUS; SUBCUTANEOUS at 21:25

## 2019-03-19 RX ADMIN — SODIUM CHLORIDE 6000 MILLILITER(S): 9 INJECTION, SOLUTION INTRAVENOUS at 12:26

## 2019-03-19 NOTE — H&P ADULT - NSHPPHYSICALEXAM_GEN_ALL_CORE
GENERAL: Alert, well developed, in no acute distress.  MENTAL STATUS: AAOx3. Appropriate affect.  HEENT: PERRLA. EOMI. MMM.  Trachea midline. No lymph node swelling or tenderness.  RESPIRATORY: CTAB. No wheezing, rales or rhonchi.  CARDIOVASCULAR: RRR. No audible murmurs, rubs or gallops.   GASTROINTESTINAL: Abdomen soft, mild lower abdominal TTP, mild distension, -R/-G.  No pulsatile mass, no flank tenderness or suprapubic tenderness. No hepatosplenomegaly.  NEUROLOGIC: Cranial nerves II-XII grossly intact. No focal neurological deficits. Moves all extremities spontaneously. Sensation intact bilaterally.  INTEGUMENTARY: No overt rashes or lesions, petechia or purpura. Good turgor. No edema.  MUSCULOSKELETAL: No cyanosis or clubbing. No gross deformities.   LYMPHATIC: Palpation of neck reveals no swelling or tenderness of neck nodes. Palpation of groin reveals no swelling or tenderness of groin nodes.

## 2019-03-19 NOTE — ED ADULT NURSE REASSESSMENT NOTE - NS ED NURSE REASSESS COMMENT FT1
report given to LEE Noel in CDU, patient moved to CDU bed 16, safety maintained, no distress noted. Hemodynamically stable.

## 2019-03-19 NOTE — ED ADULT NURSE REASSESSMENT NOTE - NS ED NURSE REASSESS COMMENT FT1
Pt c/o pain, surgery Md called , will place orders for pain medications per MD. Awaiting orders, safety maintained.

## 2019-03-19 NOTE — H&P ADULT - HISTORY OF PRESENT ILLNESS
64 y/o F w/ PMH of DM, HTN, HLD and multiple abdominal surgeries including abdomioplasty w/ post-op bleeding requiring ex-lap, hysterectomy, and ventral hernia repair with mesh presenting with abdominal bloating, nausea, and vomiting. Patient was in her usual state of health when she started to have acute onset nausea, vomiting, and bloating this morning. Patient reports that she had a BM and a small amount of gas early this AM. She reports +nausea at this time. Subjective fevers and chills at home. Denies prior similar episodes in the past. Denies CP/SOB. Denies changes in diet or sick contacts at home.

## 2019-03-19 NOTE — H&P ADULT - ASSESSMENT
64 y/o F w/ PMH of DM, HTN, HLD and multiple abdominal surgeries including abdominoplasty w/ post-op bleeding requiring ex-lap, hysterectomy, and ventral hernia repair with mesh presenting with abdominal bloating, nausea, and vomiting with evidence of SBO on imaging, now s/p NGT placement.   -Admit to [facility], Dr. Haddad, Any bed  -NPO w/ IVF, NGT decompression  -Hold home meds  -ISS  -OOB, ambulate, IS use  -DVT PPX:  Dispo: admit, NPO     Patient evaluated and plan discussed with attending physician Dr. Haddad

## 2019-03-19 NOTE — ED ADULT NURSE REASSESSMENT NOTE - NS ED NURSE REASSESS COMMENT FT1
NG tube placed by Surgery to right nare, connected to low wall suction. 500ML of yellowish drainage noted to canister. PT awaiting bed assignment, safety maintained.

## 2019-03-19 NOTE — H&P ADULT - ATTENDING COMMENTS
The patient was seen and examined  Details per the resident's H&P  This is a 63-year old woman who is admitted for intestinal obstruction  The patient has had multiple abdominal operations--the last three by a surgeon at [facility]    Exam:  Afebrile  Abdomen is obese, softly distended, non-tender    Labs:  WBC=16.1  CO2=19  Ca=10.1    CT images reviewed    Impression:  Intestinal obstruction  Metabolic acidosis  Hypercalcemia    Plan:  NGT/IVF  Gastrografin  The patient has told me that if she needs operation she would like to go to her surgeon--I advised her to call the surgeon to discuss.  DVT prophylaxis  Repeat labs  Send PTH

## 2019-03-20 LAB
ACETONE SERPL-MCNC: NEGATIVE — SIGNIFICANT CHANGE UP
ANION GAP SERPL CALC-SCNC: 15 MMOL/L — SIGNIFICANT CHANGE UP (ref 5–17)
BASOPHILS # BLD AUTO: 0 K/UL — SIGNIFICANT CHANGE UP (ref 0–0.2)
BASOPHILS NFR BLD AUTO: 0.1 % — SIGNIFICANT CHANGE UP (ref 0–2)
BUN SERPL-MCNC: 23 MG/DL — HIGH (ref 8–20)
CALCIUM SERPL-MCNC: 9.2 MG/DL — SIGNIFICANT CHANGE UP (ref 8.6–10.2)
CALCIUM SERPL-MCNC: 9.9 MG/DL — SIGNIFICANT CHANGE UP (ref 8.4–10.5)
CHLORIDE SERPL-SCNC: 101 MMOL/L — SIGNIFICANT CHANGE UP (ref 98–107)
CO2 SERPL-SCNC: 26 MMOL/L — SIGNIFICANT CHANGE UP (ref 22–29)
CREAT SERPL-MCNC: 0.74 MG/DL — SIGNIFICANT CHANGE UP (ref 0.5–1.3)
EOSINOPHIL # BLD AUTO: 0 K/UL — SIGNIFICANT CHANGE UP (ref 0–0.5)
EOSINOPHIL NFR BLD AUTO: 0.6 % — SIGNIFICANT CHANGE UP (ref 0–6)
GLUCOSE BLDC GLUCOMTR-MCNC: 168 MG/DL — HIGH (ref 70–99)
GLUCOSE BLDC GLUCOMTR-MCNC: 168 MG/DL — HIGH (ref 70–99)
GLUCOSE BLDC GLUCOMTR-MCNC: 182 MG/DL — HIGH (ref 70–99)
GLUCOSE BLDC GLUCOMTR-MCNC: 198 MG/DL — HIGH (ref 70–99)
GLUCOSE BLDC GLUCOMTR-MCNC: 212 MG/DL — HIGH (ref 70–99)
GLUCOSE SERPL-MCNC: 214 MG/DL — HIGH (ref 70–115)
HBA1C BLD-MCNC: 6.6 % — HIGH (ref 4–5.6)
HCT VFR BLD CALC: 44.1 % — SIGNIFICANT CHANGE UP (ref 37–47)
HGB BLD-MCNC: 14.6 G/DL — SIGNIFICANT CHANGE UP (ref 12–16)
LYMPHOCYTES # BLD AUTO: 1.4 K/UL — SIGNIFICANT CHANGE UP (ref 1–4.8)
LYMPHOCYTES # BLD AUTO: 19.6 % — LOW (ref 20–55)
MAGNESIUM SERPL-MCNC: 2 MG/DL — SIGNIFICANT CHANGE UP (ref 1.6–2.6)
MCHC RBC-ENTMCNC: 30 PG — SIGNIFICANT CHANGE UP (ref 27–31)
MCHC RBC-ENTMCNC: 33.1 G/DL — SIGNIFICANT CHANGE UP (ref 32–36)
MCV RBC AUTO: 90.7 FL — SIGNIFICANT CHANGE UP (ref 81–99)
MONOCYTES # BLD AUTO: 1 K/UL — HIGH (ref 0–0.8)
MONOCYTES NFR BLD AUTO: 13.9 % — HIGH (ref 3–10)
NEUTROPHILS # BLD AUTO: 4.6 K/UL — SIGNIFICANT CHANGE UP (ref 1.8–8)
NEUTROPHILS NFR BLD AUTO: 65.7 % — SIGNIFICANT CHANGE UP (ref 37–73)
PHOSPHATE SERPL-MCNC: 3.6 MG/DL — SIGNIFICANT CHANGE UP (ref 2.4–4.7)
PLATELET # BLD AUTO: 423 K/UL — HIGH (ref 150–400)
POTASSIUM SERPL-MCNC: 4.2 MMOL/L — SIGNIFICANT CHANGE UP (ref 3.5–5.3)
POTASSIUM SERPL-SCNC: 4.2 MMOL/L — SIGNIFICANT CHANGE UP (ref 3.5–5.3)
PTH-INTACT FLD-MCNC: 46 PG/ML — SIGNIFICANT CHANGE UP (ref 15–65)
RBC # BLD: 4.86 M/UL — SIGNIFICANT CHANGE UP (ref 4.4–5.2)
RBC # FLD: 14.5 % — SIGNIFICANT CHANGE UP (ref 11–15.6)
SODIUM SERPL-SCNC: 142 MMOL/L — SIGNIFICANT CHANGE UP (ref 135–145)
WBC # BLD: 6.9 K/UL — SIGNIFICANT CHANGE UP (ref 4.8–10.8)
WBC # FLD AUTO: 6.9 K/UL — SIGNIFICANT CHANGE UP (ref 4.8–10.8)

## 2019-03-20 PROCEDURE — 99232 SBSQ HOSP IP/OBS MODERATE 35: CPT

## 2019-03-20 PROCEDURE — 74018 RADEX ABDOMEN 1 VIEW: CPT | Mod: 26

## 2019-03-20 RX ORDER — BENZOCAINE AND MENTHOL 5; 1 G/100ML; G/100ML
1 LIQUID ORAL THREE TIMES A DAY
Qty: 0 | Refills: 0 | Status: DISCONTINUED | OUTPATIENT
Start: 2019-03-20 | End: 2019-03-23

## 2019-03-20 RX ORDER — INFLUENZA VIRUS VACCINE 15; 15; 15; 15 UG/.5ML; UG/.5ML; UG/.5ML; UG/.5ML
0.5 SUSPENSION INTRAMUSCULAR ONCE
Qty: 0 | Refills: 0 | Status: COMPLETED | OUTPATIENT
Start: 2019-03-20 | End: 2019-03-20

## 2019-03-20 RX ORDER — ONDANSETRON 8 MG/1
4 TABLET, FILM COATED ORAL ONCE
Qty: 0 | Refills: 0 | Status: COMPLETED | OUTPATIENT
Start: 2019-03-20 | End: 2019-03-20

## 2019-03-20 RX ADMIN — ONDANSETRON 4 MILLIGRAM(S): 8 TABLET, FILM COATED ORAL at 16:16

## 2019-03-20 RX ADMIN — Medication 2: at 01:15

## 2019-03-20 RX ADMIN — HYDROMORPHONE HYDROCHLORIDE 0.5 MILLIGRAM(S): 2 INJECTION INTRAMUSCULAR; INTRAVENOUS; SUBCUTANEOUS at 18:35

## 2019-03-20 RX ADMIN — HYDROMORPHONE HYDROCHLORIDE 0.5 MILLIGRAM(S): 2 INJECTION INTRAMUSCULAR; INTRAVENOUS; SUBCUTANEOUS at 14:04

## 2019-03-20 RX ADMIN — ONDANSETRON 4 MILLIGRAM(S): 8 TABLET, FILM COATED ORAL at 03:21

## 2019-03-20 RX ADMIN — Medication 4: at 11:37

## 2019-03-20 RX ADMIN — Medication 2: at 17:27

## 2019-03-20 RX ADMIN — HYDROMORPHONE HYDROCHLORIDE 0.5 MILLIGRAM(S): 2 INJECTION INTRAMUSCULAR; INTRAVENOUS; SUBCUTANEOUS at 19:33

## 2019-03-20 RX ADMIN — HYDROMORPHONE HYDROCHLORIDE 0.5 MILLIGRAM(S): 2 INJECTION INTRAMUSCULAR; INTRAVENOUS; SUBCUTANEOUS at 10:15

## 2019-03-20 RX ADMIN — HYDROMORPHONE HYDROCHLORIDE 0.5 MILLIGRAM(S): 2 INJECTION INTRAMUSCULAR; INTRAVENOUS; SUBCUTANEOUS at 05:42

## 2019-03-20 RX ADMIN — HYDROMORPHONE HYDROCHLORIDE 0.5 MILLIGRAM(S): 2 INJECTION INTRAMUSCULAR; INTRAVENOUS; SUBCUTANEOUS at 01:13

## 2019-03-20 RX ADMIN — HYDROMORPHONE HYDROCHLORIDE 0.5 MILLIGRAM(S): 2 INJECTION INTRAMUSCULAR; INTRAVENOUS; SUBCUTANEOUS at 09:49

## 2019-03-20 RX ADMIN — HYDROMORPHONE HYDROCHLORIDE 0.5 MILLIGRAM(S): 2 INJECTION INTRAMUSCULAR; INTRAVENOUS; SUBCUTANEOUS at 23:03

## 2019-03-20 RX ADMIN — HYDROMORPHONE HYDROCHLORIDE 0.5 MILLIGRAM(S): 2 INJECTION INTRAMUSCULAR; INTRAVENOUS; SUBCUTANEOUS at 01:28

## 2019-03-20 RX ADMIN — ONDANSETRON 4 MILLIGRAM(S): 8 TABLET, FILM COATED ORAL at 04:56

## 2019-03-20 RX ADMIN — HYDROMORPHONE HYDROCHLORIDE 0.5 MILLIGRAM(S): 2 INJECTION INTRAMUSCULAR; INTRAVENOUS; SUBCUTANEOUS at 22:48

## 2019-03-20 RX ADMIN — HYDROMORPHONE HYDROCHLORIDE 0.5 MILLIGRAM(S): 2 INJECTION INTRAMUSCULAR; INTRAVENOUS; SUBCUTANEOUS at 14:25

## 2019-03-20 RX ADMIN — ONDANSETRON 4 MILLIGRAM(S): 8 TABLET, FILM COATED ORAL at 22:48

## 2019-03-20 RX ADMIN — Medication 2: at 05:37

## 2019-03-20 RX ADMIN — HYDROMORPHONE HYDROCHLORIDE 0.5 MILLIGRAM(S): 2 INJECTION INTRAMUSCULAR; INTRAVENOUS; SUBCUTANEOUS at 05:27

## 2019-03-20 RX ADMIN — BENZOCAINE AND MENTHOL 1 LOZENGE: 5; 1 LIQUID ORAL at 22:49

## 2019-03-20 RX ADMIN — ONDANSETRON 4 MILLIGRAM(S): 8 TABLET, FILM COATED ORAL at 09:49

## 2019-03-20 RX ADMIN — ENOXAPARIN SODIUM 40 MILLIGRAM(S): 100 INJECTION SUBCUTANEOUS at 07:55

## 2019-03-20 RX ADMIN — Medication 2: at 23:45

## 2019-03-20 RX ADMIN — SODIUM CHLORIDE 125 MILLILITER(S): 9 INJECTION, SOLUTION INTRAVENOUS at 14:04

## 2019-03-20 NOTE — PROGRESS NOTE ADULT - ASSESSMENT
62 y/o F w/ PMH of DM, HTN, HLD and multiple abdominal surgeries including abdominoplasty w/ post-op bleeding requiring ex-lap, hysterectomy, and ventral hernia repair with mesh presenting with abdominal bloating, nausea, and vomiting with evidence of SBO on imaging, now with NGT in place  - monitor NGT outout  - JOJO  - f/u bowel function  - NPO/IVF for now  - pain control 64 y/o F w/ PMH of DM, HTN, HLD and multiple abdominal surgeries including abdominoplasty w/ post-op bleeding requiring ex-lap, hysterectomy, and ventral hernia repair with mesh presenting with abdominal bloating, nausea, and vomiting with evidence of SBO on imaging, now with NGT in place  - monitor NGT outout  - JOJO  - f/u bowel function  - NPO/IVF for now  - pain control  - flush NGT TID

## 2019-03-20 NOTE — PROGRESS NOTE ADULT - SUBJECTIVE AND OBJECTIVE BOX
Patient seen at bedside. She reports continued diffuse abdominal pain. Denies flatus, bowel movement. 800 cc output of NGT in last 24 hours. NGT flushed at bedside. Currently NPO    MEDICATIONS  (STANDING):  dextrose 5% + lactated ringers. 1000 milliLiter(s) (125 mL/Hr) IV Continuous <Continuous>  dextrose 5%. 1000 milliLiter(s) (50 mL/Hr) IV Continuous <Continuous>  dextrose 50% Injectable 12.5 Gram(s) IV Push once  dextrose 50% Injectable 25 Gram(s) IV Push once  dextrose 50% Injectable 25 Gram(s) IV Push once  enoxaparin Injectable 40 milliGRAM(s) SubCutaneous every 24 hours  insulin lispro (HumaLOG) corrective regimen sliding scale   SubCutaneous every 6 hours    MEDICATIONS  (PRN):  acetaminophen  IVPB .. 1000 milliGRAM(s) IV Intermittent once PRN Mild Pain (1 - 3)  benzocaine 15 mG/menthol 3.6 mG (Sugar-Free) Lozenge 1 Lozenge Oral three times a day PRN Sore Throat  dextrose 40% Gel 15 Gram(s) Oral once PRN Blood Glucose LESS THAN 70 milliGRAM(s)/deciliter  glucagon  Injectable 1 milliGRAM(s) IntraMuscular once PRN Glucose LESS THAN 70 milligrams/deciliter  HYDROmorphone  Injectable 0.5 milliGRAM(s) IV Push every 4 hours PRN Severe Pain (7 - 10)  ondansetron Injectable 4 milliGRAM(s) IV Push every 6 hours PRN Nausea      Vital Signs Last 24 Hrs  T(C): 37 (20 Mar 2019 07:43), Max: 37 (20 Mar 2019 07:43)  T(F): 98.6 (20 Mar 2019 07:43), Max: 98.6 (20 Mar 2019 07:43)  HR: 86 (20 Mar 2019 07:43) (85 - 91)  BP: 116/78 (20 Mar 2019 07:43) (116/78 - 139/92)  BP(mean): --  RR: 17 (20 Mar 2019 07:43) (17 - 19)  SpO2: 92% (20 Mar 2019 07:43) (92% - 92%)    PE  GENERAL: Alert, well developed, in no acute distress.  	MENTAL STATUS: AAOx3. Appropriate affect.  	HEENT: PERRLA. EOMI. MMM.  Trachea midline. No lymph node swelling or tenderness.  	RESPIRATORY: CTAB. No wheezing, rales or rhonchi.  	CARDIOVASCULAR: RRR. No audible murmurs, rubs or gallops.   	GASTROINTESTINAL: Abdomen soft, mild lower abdominal TTP, mild distension, -R/-G.  No pulsatile mass, no flank tenderness or suprapubic tenderness. No hepatosplenomegaly.  	NEUROLOGIC: Cranial nerves II-XII grossly intact. No focal neurological deficits. Moves all extremities spontaneously. Sensation intact bilaterally.  	INTEGUMENTARY: No overt rashes or lesions, petechia or purpura. Good turgor. No edema.  	MUSCULOSKELETAL: No cyanosis or clubbing. No gross deformities.   LYMPHATIC: Palpation of neck reveals no swelling or tenderness of neck nodes. Palpation of groin reveals no swelling or tenderness of groin nodes.      I&O's Detail    19 Mar 2019 07:  -  20 Mar 2019 07:00  --------------------------------------------------------  IN:    dextrose 5% + lactated ringers.: 875 mL  Total IN: 875 mL    OUT:    Nasoenteral Tube: 1400 mL    Voided: 700 mL  Total OUT: 2100 mL    Total NET: -1225 mL      20 Mar 2019 07:  -  20 Mar 2019 13:35  --------------------------------------------------------  IN:    dextrose 5% + lactated ringers.: 875 mL  Total IN: 875 mL    OUT:    Nasoenteral Tube: 800 mL  Total OUT: 800 mL    Total NET: 75 mL          LABS:                        14.6   6.9   )-----------( 423      ( 20 Mar 2019 07:43 )             44.1         142  |  101  |  23.0<H>  ----------------------------<  214<H>  4.2   |  26.0  |  0.74    Ca    9.2      20 Mar 2019 07:43  Phos  3.6     03-20  Mg     2.0     -20    TPro  8.4  /  Alb  4.3  /  TBili  0.5  /  DBili  x   /  AST  21  /  ALT  18  /  AlkPhos  102  03-18    PT/INR - ( 18 Mar 2019 21:28 )   PT: 10.8 sec;   INR: 0.94 ratio         PTT - ( 18 Mar 2019 21:28 )  PTT:35.1 sec  Urinalysis Basic - ( 19 Mar 2019 04:51 )    Color: Yellow / Appearance: Clear / S.010 / pH: x  Gluc: x / Ketone: Small  / Bili: Negative / Urobili: Negative mg/dL   Blood: x / Protein: 30 mg/dL / Nitrite: Negative   Leuk Esterase: Trace / RBC: 0-2 /HPF / WBC 3-5   Sq Epi: x / Non Sq Epi: Occasional / Bacteria: Occasional        RADIOLOGY & ADDITIONAL STUDIES:

## 2019-03-21 LAB
ANION GAP SERPL CALC-SCNC: 12 MMOL/L — SIGNIFICANT CHANGE UP (ref 5–17)
BASOPHILS # BLD AUTO: 0 K/UL — SIGNIFICANT CHANGE UP (ref 0–0.2)
BASOPHILS NFR BLD AUTO: 0.1 % — SIGNIFICANT CHANGE UP (ref 0–2)
BUN SERPL-MCNC: 25 MG/DL — HIGH (ref 8–20)
CALCIUM SERPL-MCNC: 9.8 MG/DL — SIGNIFICANT CHANGE UP (ref 8.6–10.2)
CHLORIDE SERPL-SCNC: 95 MMOL/L — LOW (ref 98–107)
CO2 SERPL-SCNC: 34 MMOL/L — HIGH (ref 22–29)
CREAT SERPL-MCNC: 0.72 MG/DL — SIGNIFICANT CHANGE UP (ref 0.5–1.3)
EOSINOPHIL # BLD AUTO: 0 K/UL — SIGNIFICANT CHANGE UP (ref 0–0.5)
EOSINOPHIL NFR BLD AUTO: 0.7 % — SIGNIFICANT CHANGE UP (ref 0–6)
GLUCOSE BLDC GLUCOMTR-MCNC: 176 MG/DL — HIGH (ref 70–99)
GLUCOSE BLDC GLUCOMTR-MCNC: 192 MG/DL — HIGH (ref 70–99)
GLUCOSE BLDC GLUCOMTR-MCNC: 196 MG/DL — HIGH (ref 70–99)
GLUCOSE BLDC GLUCOMTR-MCNC: 199 MG/DL — HIGH (ref 70–99)
GLUCOSE SERPL-MCNC: 217 MG/DL — HIGH (ref 70–115)
HCT VFR BLD CALC: 46.5 % — SIGNIFICANT CHANGE UP (ref 37–47)
HCV AB S/CO SERPL IA: 0.1 S/CO — SIGNIFICANT CHANGE UP (ref 0–0.79)
HCV AB SERPL-IMP: SIGNIFICANT CHANGE UP
HGB BLD-MCNC: 14.9 G/DL — SIGNIFICANT CHANGE UP (ref 12–16)
LYMPHOCYTES # BLD AUTO: 1.4 K/UL — SIGNIFICANT CHANGE UP (ref 1–4.8)
LYMPHOCYTES # BLD AUTO: 20.1 % — SIGNIFICANT CHANGE UP (ref 20–55)
MAGNESIUM SERPL-MCNC: 2.1 MG/DL — SIGNIFICANT CHANGE UP (ref 1.6–2.6)
MCHC RBC-ENTMCNC: 29.2 PG — SIGNIFICANT CHANGE UP (ref 27–31)
MCHC RBC-ENTMCNC: 32 G/DL — SIGNIFICANT CHANGE UP (ref 32–36)
MCV RBC AUTO: 91 FL — SIGNIFICANT CHANGE UP (ref 81–99)
MONOCYTES # BLD AUTO: 1.1 K/UL — HIGH (ref 0–0.8)
MONOCYTES NFR BLD AUTO: 15.2 % — HIGH (ref 3–10)
NEUTROPHILS # BLD AUTO: 4.5 K/UL — SIGNIFICANT CHANGE UP (ref 1.8–8)
NEUTROPHILS NFR BLD AUTO: 63.6 % — SIGNIFICANT CHANGE UP (ref 37–73)
PHOSPHATE SERPL-MCNC: 3.6 MG/DL — SIGNIFICANT CHANGE UP (ref 2.4–4.7)
PLATELET # BLD AUTO: 415 K/UL — HIGH (ref 150–400)
POTASSIUM SERPL-MCNC: 4.3 MMOL/L — SIGNIFICANT CHANGE UP (ref 3.5–5.3)
POTASSIUM SERPL-SCNC: 4.3 MMOL/L — SIGNIFICANT CHANGE UP (ref 3.5–5.3)
RBC # BLD: 5.11 M/UL — SIGNIFICANT CHANGE UP (ref 4.4–5.2)
RBC # FLD: 14.8 % — SIGNIFICANT CHANGE UP (ref 11–15.6)
SODIUM SERPL-SCNC: 141 MMOL/L — SIGNIFICANT CHANGE UP (ref 135–145)
WBC # BLD: 7.1 K/UL — SIGNIFICANT CHANGE UP (ref 4.8–10.8)
WBC # FLD AUTO: 7.1 K/UL — SIGNIFICANT CHANGE UP (ref 4.8–10.8)

## 2019-03-21 PROCEDURE — 99231 SBSQ HOSP IP/OBS SF/LOW 25: CPT

## 2019-03-21 PROCEDURE — 74019 RADEX ABDOMEN 2 VIEWS: CPT | Mod: 26

## 2019-03-21 PROCEDURE — 74018 RADEX ABDOMEN 1 VIEW: CPT | Mod: 26

## 2019-03-21 RX ORDER — ONDANSETRON 8 MG/1
4 TABLET, FILM COATED ORAL ONCE
Qty: 0 | Refills: 0 | Status: COMPLETED | OUTPATIENT
Start: 2019-03-21 | End: 2019-03-21

## 2019-03-21 RX ADMIN — Medication 2: at 06:18

## 2019-03-21 RX ADMIN — Medication 2: at 11:35

## 2019-03-21 RX ADMIN — HYDROMORPHONE HYDROCHLORIDE 0.5 MILLIGRAM(S): 2 INJECTION INTRAMUSCULAR; INTRAVENOUS; SUBCUTANEOUS at 07:30

## 2019-03-21 RX ADMIN — ONDANSETRON 4 MILLIGRAM(S): 8 TABLET, FILM COATED ORAL at 19:59

## 2019-03-21 RX ADMIN — HYDROMORPHONE HYDROCHLORIDE 0.5 MILLIGRAM(S): 2 INJECTION INTRAMUSCULAR; INTRAVENOUS; SUBCUTANEOUS at 03:03

## 2019-03-21 RX ADMIN — SODIUM CHLORIDE 125 MILLILITER(S): 9 INJECTION, SOLUTION INTRAVENOUS at 14:55

## 2019-03-21 RX ADMIN — Medication 2: at 17:08

## 2019-03-21 RX ADMIN — ONDANSETRON 4 MILLIGRAM(S): 8 TABLET, FILM COATED ORAL at 06:17

## 2019-03-21 RX ADMIN — HYDROMORPHONE HYDROCHLORIDE 0.5 MILLIGRAM(S): 2 INJECTION INTRAMUSCULAR; INTRAVENOUS; SUBCUTANEOUS at 06:48

## 2019-03-21 RX ADMIN — HYDROMORPHONE HYDROCHLORIDE 0.5 MILLIGRAM(S): 2 INJECTION INTRAMUSCULAR; INTRAVENOUS; SUBCUTANEOUS at 21:37

## 2019-03-21 RX ADMIN — HYDROMORPHONE HYDROCHLORIDE 0.5 MILLIGRAM(S): 2 INJECTION INTRAMUSCULAR; INTRAVENOUS; SUBCUTANEOUS at 18:31

## 2019-03-21 RX ADMIN — ENOXAPARIN SODIUM 40 MILLIGRAM(S): 100 INJECTION SUBCUTANEOUS at 06:18

## 2019-03-21 RX ADMIN — SODIUM CHLORIDE 125 MILLILITER(S): 9 INJECTION, SOLUTION INTRAVENOUS at 23:06

## 2019-03-21 RX ADMIN — HYDROMORPHONE HYDROCHLORIDE 0.5 MILLIGRAM(S): 2 INJECTION INTRAMUSCULAR; INTRAVENOUS; SUBCUTANEOUS at 17:50

## 2019-03-21 RX ADMIN — Medication 2: at 23:06

## 2019-03-21 RX ADMIN — HYDROMORPHONE HYDROCHLORIDE 0.5 MILLIGRAM(S): 2 INJECTION INTRAMUSCULAR; INTRAVENOUS; SUBCUTANEOUS at 02:48

## 2019-03-21 RX ADMIN — HYDROMORPHONE HYDROCHLORIDE 0.5 MILLIGRAM(S): 2 INJECTION INTRAMUSCULAR; INTRAVENOUS; SUBCUTANEOUS at 21:22

## 2019-03-21 NOTE — PROGRESS NOTE ADULT - ASSESSMENT
64 y/o F w/ PMH of DM, HTN, HLD and multiple abdominal surgeries including abdominoplasty w/ post-op bleeding requiring ex-lap, hysterectomy, and ventral hernia repair with mesh presenting with abdominal bloating, nausea, and vomiting with evidence of SBO on imaging, now with NGT in place    Plan:  - continue to monitor NGT output  - JOJO  - f/u bowel function  - NPO/IVF for now  - pain control  - flush NGT TID  - OOB  - DVT ppx

## 2019-03-21 NOTE — PROGRESS NOTE ADULT - SUBJECTIVE AND OBJECTIVE BOX
INTERVAL HPI/OVERNIGHT EVENTS: Patient given GG challenge yesterday afternoon, KUB showed contrast in stomach. Patient continued to have bilious output from her NGT, approx 1600 o/n    SUBJECTIVE: Feels this same this AM, still distended, no flatus or BM.       MEDICATIONS  (STANDING):  dextrose 5% + lactated ringers. 1000 milliLiter(s) (125 mL/Hr) IV Continuous <Continuous>  dextrose 5%. 1000 milliLiter(s) (50 mL/Hr) IV Continuous <Continuous>  dextrose 50% Injectable 12.5 Gram(s) IV Push once  dextrose 50% Injectable 25 Gram(s) IV Push once  dextrose 50% Injectable 25 Gram(s) IV Push once  enoxaparin Injectable 40 milliGRAM(s) SubCutaneous every 24 hours  insulin lispro (HumaLOG) corrective regimen sliding scale   SubCutaneous every 6 hours    MEDICATIONS  (PRN):  acetaminophen  IVPB .. 1000 milliGRAM(s) IV Intermittent once PRN Mild Pain (1 - 3)  benzocaine 15 mG/menthol 3.6 mG (Sugar-Free) Lozenge 1 Lozenge Oral three times a day PRN Sore Throat  dextrose 40% Gel 15 Gram(s) Oral once PRN Blood Glucose LESS THAN 70 milliGRAM(s)/deciliter  glucagon  Injectable 1 milliGRAM(s) IntraMuscular once PRN Glucose LESS THAN 70 milligrams/deciliter  HYDROmorphone  Injectable 0.5 milliGRAM(s) IV Push every 4 hours PRN Severe Pain (7 - 10)  ondansetron Injectable 4 milliGRAM(s) IV Push every 6 hours PRN Nausea      Vital Signs Last 24 Hrs  T(C): 37.4 (20 Mar 2019 23:09), Max: 37.4 (20 Mar 2019 23:09)  T(F): 99.3 (20 Mar 2019 23:09), Max: 99.3 (20 Mar 2019 23:09)  HR: 96 (20 Mar 2019 23:09) (86 - 96)  BP: 131/83 (20 Mar 2019 23:09) (116/78 - 131/83)  BP(mean): --  RR: 20 (20 Mar 2019 23:09) (17 - 20)  SpO2: 91% (20 Mar 2019 23:09) (91% - 96%)    PE  Gen: NAD  HEENT: NGT in place with bilious output  CV: +S1, S2  Pulm: No increased WOB  Abd: mildly distended, soft, diffusely tender to palpation, re rebound, no guarding  Ext: Moving all extremities      I&O's Detail    19 Mar 2019 07:01  -  20 Mar 2019 07:00  --------------------------------------------------------  IN:    dextrose 5% + lactated ringers.: 875 mL  Total IN: 875 mL    OUT:    Nasoenteral Tube: 1400 mL    Voided: 700 mL  Total OUT: 2100 mL    Total NET: -1225 mL      20 Mar 2019 07:01  -  21 Mar 2019 05:07  --------------------------------------------------------  IN:    dextrose 5% + lactated ringers.: 2250 mL  Total IN: 2250 mL    OUT:    Nasoenteral Tube: 1750 mL  Total OUT: 1750 mL    Total NET: 500 mL          LABS:                        14.6   6.9   )-----------( 423      ( 20 Mar 2019 07:43 )             44.1     03-20    142  |  101  |  23.0<H>  ----------------------------<  214<H>  4.2   |  26.0  |  0.74    Ca    9.2      20 Mar 2019 07:43  Phos  3.6     03-20  Mg     2.0     03-20            RADIOLOGY & ADDITIONAL STUDIES:

## 2019-03-22 LAB
ANION GAP SERPL CALC-SCNC: 17 MMOL/L — SIGNIFICANT CHANGE UP (ref 5–17)
BASOPHILS # BLD AUTO: 0 K/UL — SIGNIFICANT CHANGE UP (ref 0–0.2)
BASOPHILS NFR BLD AUTO: 0.2 % — SIGNIFICANT CHANGE UP (ref 0–2)
BLD GP AB SCN SERPL QL: SIGNIFICANT CHANGE UP
BUN SERPL-MCNC: 25 MG/DL — HIGH (ref 8–20)
CALCIUM SERPL-MCNC: 9.9 MG/DL — SIGNIFICANT CHANGE UP (ref 8.6–10.2)
CHLORIDE SERPL-SCNC: 89 MMOL/L — LOW (ref 98–107)
CO2 SERPL-SCNC: 35 MMOL/L — HIGH (ref 22–29)
CREAT SERPL-MCNC: 0.66 MG/DL — SIGNIFICANT CHANGE UP (ref 0.5–1.3)
EOSINOPHIL # BLD AUTO: 0.1 K/UL — SIGNIFICANT CHANGE UP (ref 0–0.5)
EOSINOPHIL NFR BLD AUTO: 0.8 % — SIGNIFICANT CHANGE UP (ref 0–6)
GLUCOSE BLDC GLUCOMTR-MCNC: 160 MG/DL — HIGH (ref 70–99)
GLUCOSE BLDC GLUCOMTR-MCNC: 170 MG/DL — HIGH (ref 70–99)
GLUCOSE BLDC GLUCOMTR-MCNC: 172 MG/DL — HIGH (ref 70–99)
GLUCOSE BLDC GLUCOMTR-MCNC: 214 MG/DL — HIGH (ref 70–99)
GLUCOSE SERPL-MCNC: 158 MG/DL — HIGH (ref 70–115)
HCT VFR BLD CALC: 46.1 % — SIGNIFICANT CHANGE UP (ref 37–47)
HGB BLD-MCNC: 15.1 G/DL — SIGNIFICANT CHANGE UP (ref 12–16)
LYMPHOCYTES # BLD AUTO: 2.2 K/UL — SIGNIFICANT CHANGE UP (ref 1–4.8)
LYMPHOCYTES # BLD AUTO: 25.3 % — SIGNIFICANT CHANGE UP (ref 20–55)
MAGNESIUM SERPL-MCNC: 2 MG/DL — SIGNIFICANT CHANGE UP (ref 1.6–2.6)
MCHC RBC-ENTMCNC: 29.4 PG — SIGNIFICANT CHANGE UP (ref 27–31)
MCHC RBC-ENTMCNC: 32.8 G/DL — SIGNIFICANT CHANGE UP (ref 32–36)
MCV RBC AUTO: 89.9 FL — SIGNIFICANT CHANGE UP (ref 81–99)
MONOCYTES # BLD AUTO: 1.2 K/UL — HIGH (ref 0–0.8)
MONOCYTES NFR BLD AUTO: 13.9 % — HIGH (ref 3–10)
NEUTROPHILS # BLD AUTO: 5.2 K/UL — SIGNIFICANT CHANGE UP (ref 1.8–8)
NEUTROPHILS NFR BLD AUTO: 59.7 % — SIGNIFICANT CHANGE UP (ref 37–73)
PHOSPHATE SERPL-MCNC: 3.8 MG/DL — SIGNIFICANT CHANGE UP (ref 2.4–4.7)
PLATELET # BLD AUTO: 408 K/UL — HIGH (ref 150–400)
POTASSIUM SERPL-MCNC: 3.2 MMOL/L — LOW (ref 3.5–5.3)
POTASSIUM SERPL-SCNC: 3.2 MMOL/L — LOW (ref 3.5–5.3)
RBC # BLD: 5.13 M/UL — SIGNIFICANT CHANGE UP (ref 4.4–5.2)
RBC # FLD: 14.4 % — SIGNIFICANT CHANGE UP (ref 11–15.6)
SODIUM SERPL-SCNC: 141 MMOL/L — SIGNIFICANT CHANGE UP (ref 135–145)
TYPE + AB SCN PNL BLD: SIGNIFICANT CHANGE UP
WBC # BLD: 8.8 K/UL — SIGNIFICANT CHANGE UP (ref 4.8–10.8)
WBC # FLD AUTO: 8.8 K/UL — SIGNIFICANT CHANGE UP (ref 4.8–10.8)

## 2019-03-22 PROCEDURE — 74018 RADEX ABDOMEN 1 VIEW: CPT | Mod: 26,77

## 2019-03-22 PROCEDURE — 74018 RADEX ABDOMEN 1 VIEW: CPT | Mod: 26

## 2019-03-22 PROCEDURE — 99231 SBSQ HOSP IP/OBS SF/LOW 25: CPT

## 2019-03-22 RX ORDER — POTASSIUM CHLORIDE 20 MEQ
20 PACKET (EA) ORAL
Qty: 0 | Refills: 0 | Status: DISCONTINUED | OUTPATIENT
Start: 2019-03-22 | End: 2019-03-22

## 2019-03-22 RX ORDER — POTASSIUM CHLORIDE 20 MEQ
10 PACKET (EA) ORAL
Qty: 0 | Refills: 0 | Status: COMPLETED | OUTPATIENT
Start: 2019-03-22 | End: 2019-03-22

## 2019-03-22 RX ADMIN — Medication 50 MILLIEQUIVALENT(S): at 17:35

## 2019-03-22 RX ADMIN — Medication 4: at 11:27

## 2019-03-22 RX ADMIN — SODIUM CHLORIDE 125 MILLILITER(S): 9 INJECTION, SOLUTION INTRAVENOUS at 17:39

## 2019-03-22 RX ADMIN — Medication 100 MILLIEQUIVALENT(S): at 20:36

## 2019-03-22 RX ADMIN — Medication 100 MILLIEQUIVALENT(S): at 22:41

## 2019-03-22 RX ADMIN — Medication 2: at 06:05

## 2019-03-22 RX ADMIN — Medication 100 MILLIEQUIVALENT(S): at 21:38

## 2019-03-22 RX ADMIN — Medication 2: at 17:34

## 2019-03-22 RX ADMIN — ENOXAPARIN SODIUM 40 MILLIGRAM(S): 100 INJECTION SUBCUTANEOUS at 08:48

## 2019-03-22 RX ADMIN — ONDANSETRON 4 MILLIGRAM(S): 8 TABLET, FILM COATED ORAL at 13:05

## 2019-03-22 RX ADMIN — Medication 2: at 22:44

## 2019-03-22 NOTE — PROGRESS NOTE ADULT - SUBJECTIVE AND OBJECTIVE BOX
No acute events over night. Patient given PO contrast and NGT clamped, anticipating a repeat KUB at 10PM. Patient complained of nausea around 730, given zofran but continued to complain of nausea so NGT placed back to suction around 930. PM KUB demonstrated minimal contrast in ascending colon, dilated loops of small bowel. 2250 cc out NGT in last 24 hours.     MEDICATIONS  (STANDING):  dextrose 5% + lactated ringers. 1000 milliLiter(s) (125 mL/Hr) IV Continuous <Continuous>  dextrose 5%. 1000 milliLiter(s) (50 mL/Hr) IV Continuous <Continuous>  dextrose 50% Injectable 12.5 Gram(s) IV Push once  dextrose 50% Injectable 25 Gram(s) IV Push once  dextrose 50% Injectable 25 Gram(s) IV Push once  enoxaparin Injectable 40 milliGRAM(s) SubCutaneous every 24 hours  insulin lispro (HumaLOG) corrective regimen sliding scale   SubCutaneous every 6 hours    MEDICATIONS  (PRN):  acetaminophen  IVPB .. 1000 milliGRAM(s) IV Intermittent once PRN Mild Pain (1 - 3)  benzocaine 15 mG/menthol 3.6 mG (Sugar-Free) Lozenge 1 Lozenge Oral three times a day PRN Sore Throat  dextrose 40% Gel 15 Gram(s) Oral once PRN Blood Glucose LESS THAN 70 milliGRAM(s)/deciliter  glucagon  Injectable 1 milliGRAM(s) IntraMuscular once PRN Glucose LESS THAN 70 milligrams/deciliter  HYDROmorphone  Injectable 0.5 milliGRAM(s) IV Push every 4 hours PRN Severe Pain (7 - 10)  ondansetron Injectable 4 milliGRAM(s) IV Push every 6 hours PRN Nausea      Vital Signs Last 24 Hrs  T(C): 36.7 (21 Mar 2019 23:15), Max: 36.7 (21 Mar 2019 23:15)  T(F): 98 (21 Mar 2019 23:15), Max: 98 (21 Mar 2019 23:15)  HR: 86 (21 Mar 2019 23:15) (84 - 86)  BP: 133/86 (21 Mar 2019 23:15) (133/86 - 136/94)  BP(mean): --  RR: 20 (21 Mar 2019 23:15) (17 - 20)  SpO2: 94% (21 Mar 2019 23:15) (94% - 96%)    PE  Gen: NAD  HEENT: NGT in place with bilious output  CV: +S1, S2  Pulm: No increased WOB  Abd: mildly distended, soft, diffusely tender to palpation, re rebound, no guarding  Ext: Moving all extremities      I&O's Detail    21 Mar 2019 07:01  -  22 Mar 2019 07:00  --------------------------------------------------------  IN:    dextrose 5% + lactated ringers.: 3125 mL  Total IN: 3125 mL    OUT:    Nasoenteral Tube: 4300 mL  Total OUT: 4300 mL    Total NET: -1175 mL          LABS:                        14.9   7.1   )-----------( 415      ( 21 Mar 2019 06:49 )             46.5     03-21    141  |  95<L>  |  25.0<H>  ----------------------------<  217<H>  4.3   |  34.0<H>  |  0.72    Ca    9.8      21 Mar 2019 06:49  Phos  3.6     03-21  Mg     2.1     03-21            RADIOLOGY & ADDITIONAL STUDIES:

## 2019-03-22 NOTE — PROGRESS NOTE ADULT - ASSESSMENT
64 y/o F w/ PMH of DM, HTN, HLD and multiple abdominal surgeries including abdominoplasty w/ post-op bleeding requiring ex-lap, hysterectomy, and ventral hernia repair with mesh presenting with abdominal bloating, nausea, and vomiting with evidence of SBO on imaging, now with NGT in place    Plan:  - continue to monitor NGT output  - JOJO  - f/u bowel function  - NPO/IVF for now  - discussed possibility of surgery should SBO not resolve within 48hrs  - pain control  - flush NGT TID  - OOB  - DVT ppx

## 2019-03-23 LAB
ANION GAP SERPL CALC-SCNC: 11 MMOL/L — SIGNIFICANT CHANGE UP (ref 5–17)
ANION GAP SERPL CALC-SCNC: 17 MMOL/L — SIGNIFICANT CHANGE UP (ref 5–17)
BASOPHILS # BLD AUTO: 0 K/UL — SIGNIFICANT CHANGE UP (ref 0–0.2)
BASOPHILS NFR BLD AUTO: 0.2 % — SIGNIFICANT CHANGE UP (ref 0–2)
BUN SERPL-MCNC: 16 MG/DL — SIGNIFICANT CHANGE UP (ref 8–20)
BUN SERPL-MCNC: 20 MG/DL — SIGNIFICANT CHANGE UP (ref 8–20)
CALCIUM SERPL-MCNC: 9.1 MG/DL — SIGNIFICANT CHANGE UP (ref 8.6–10.2)
CALCIUM SERPL-MCNC: 9.3 MG/DL — SIGNIFICANT CHANGE UP (ref 8.6–10.2)
CHLORIDE SERPL-SCNC: 82 MMOL/L — LOW (ref 98–107)
CHLORIDE SERPL-SCNC: 86 MMOL/L — LOW (ref 98–107)
CO2 SERPL-SCNC: 34 MMOL/L — HIGH (ref 22–29)
CO2 SERPL-SCNC: 38 MMOL/L — HIGH (ref 22–29)
CREAT SERPL-MCNC: 0.66 MG/DL — SIGNIFICANT CHANGE UP (ref 0.5–1.3)
CREAT SERPL-MCNC: 0.72 MG/DL — SIGNIFICANT CHANGE UP (ref 0.5–1.3)
EOSINOPHIL # BLD AUTO: 0.1 K/UL — SIGNIFICANT CHANGE UP (ref 0–0.5)
EOSINOPHIL NFR BLD AUTO: 1.4 % — SIGNIFICANT CHANGE UP (ref 0–6)
GLUCOSE BLDC GLUCOMTR-MCNC: 125 MG/DL — HIGH (ref 70–99)
GLUCOSE BLDC GLUCOMTR-MCNC: 140 MG/DL — HIGH (ref 70–99)
GLUCOSE BLDC GLUCOMTR-MCNC: 156 MG/DL — HIGH (ref 70–99)
GLUCOSE BLDC GLUCOMTR-MCNC: 169 MG/DL — HIGH (ref 70–99)
GLUCOSE SERPL-MCNC: 129 MG/DL — HIGH (ref 70–115)
GLUCOSE SERPL-MCNC: 191 MG/DL — HIGH (ref 70–115)
HCT VFR BLD CALC: 41.6 % — SIGNIFICANT CHANGE UP (ref 37–47)
HGB BLD-MCNC: 13.8 G/DL — SIGNIFICANT CHANGE UP (ref 12–16)
LYMPHOCYTES # BLD AUTO: 2.2 K/UL — SIGNIFICANT CHANGE UP (ref 1–4.8)
LYMPHOCYTES # BLD AUTO: 26.4 % — SIGNIFICANT CHANGE UP (ref 20–55)
MAGNESIUM SERPL-MCNC: 1.7 MG/DL — SIGNIFICANT CHANGE UP (ref 1.6–2.6)
MCHC RBC-ENTMCNC: 29.4 PG — SIGNIFICANT CHANGE UP (ref 27–31)
MCHC RBC-ENTMCNC: 33.2 G/DL — SIGNIFICANT CHANGE UP (ref 32–36)
MCV RBC AUTO: 88.5 FL — SIGNIFICANT CHANGE UP (ref 81–99)
MONOCYTES # BLD AUTO: 0.9 K/UL — HIGH (ref 0–0.8)
MONOCYTES NFR BLD AUTO: 11.2 % — HIGH (ref 3–10)
NEUTROPHILS # BLD AUTO: 5.1 K/UL — SIGNIFICANT CHANGE UP (ref 1.8–8)
NEUTROPHILS NFR BLD AUTO: 60.7 % — SIGNIFICANT CHANGE UP (ref 37–73)
PHOSPHATE SERPL-MCNC: 3.2 MG/DL — SIGNIFICANT CHANGE UP (ref 2.4–4.7)
PLATELET # BLD AUTO: 362 K/UL — SIGNIFICANT CHANGE UP (ref 150–400)
POTASSIUM SERPL-MCNC: 2.6 MMOL/L — CRITICAL LOW (ref 3.5–5.3)
POTASSIUM SERPL-MCNC: 3 MMOL/L — LOW (ref 3.5–5.3)
POTASSIUM SERPL-SCNC: 2.6 MMOL/L — CRITICAL LOW (ref 3.5–5.3)
POTASSIUM SERPL-SCNC: 3 MMOL/L — LOW (ref 3.5–5.3)
RBC # BLD: 4.7 M/UL — SIGNIFICANT CHANGE UP (ref 4.4–5.2)
RBC # FLD: 13.7 % — SIGNIFICANT CHANGE UP (ref 11–15.6)
SODIUM SERPL-SCNC: 133 MMOL/L — LOW (ref 135–145)
SODIUM SERPL-SCNC: 135 MMOL/L — SIGNIFICANT CHANGE UP (ref 135–145)
WBC # BLD: 8.4 K/UL — SIGNIFICANT CHANGE UP (ref 4.8–10.8)
WBC # FLD AUTO: 8.4 K/UL — SIGNIFICANT CHANGE UP (ref 4.8–10.8)

## 2019-03-23 PROCEDURE — 74018 RADEX ABDOMEN 1 VIEW: CPT | Mod: 26

## 2019-03-23 RX ORDER — POTASSIUM CHLORIDE 20 MEQ
10 PACKET (EA) ORAL
Qty: 0 | Refills: 0 | Status: COMPLETED | OUTPATIENT
Start: 2019-03-23 | End: 2019-03-23

## 2019-03-23 RX ORDER — MAGNESIUM SULFATE 500 MG/ML
2 VIAL (ML) INJECTION ONCE
Qty: 0 | Refills: 0 | Status: COMPLETED | OUTPATIENT
Start: 2019-03-23 | End: 2019-03-23

## 2019-03-23 RX ORDER — POTASSIUM CHLORIDE 20 MEQ
20 PACKET (EA) ORAL
Qty: 0 | Refills: 0 | Status: DISCONTINUED | OUTPATIENT
Start: 2019-03-23 | End: 2019-03-23

## 2019-03-23 RX ORDER — MINERAL OIL
133 OIL (ML) MISCELLANEOUS ONCE
Qty: 0 | Refills: 0 | Status: COMPLETED | OUTPATIENT
Start: 2019-03-23 | End: 2019-03-23

## 2019-03-23 RX ORDER — POTASSIUM CHLORIDE 20 MEQ
40 PACKET (EA) ORAL EVERY 4 HOURS
Qty: 0 | Refills: 0 | Status: COMPLETED | OUTPATIENT
Start: 2019-03-23 | End: 2019-03-23

## 2019-03-23 RX ORDER — BENZOCAINE AND MENTHOL 5; 1 G/100ML; G/100ML
1 LIQUID ORAL EVERY 6 HOURS
Qty: 0 | Refills: 0 | Status: DISCONTINUED | OUTPATIENT
Start: 2019-03-23 | End: 2019-03-27

## 2019-03-23 RX ORDER — SODIUM CHLORIDE 9 MG/ML
1000 INJECTION, SOLUTION INTRAVENOUS
Qty: 0 | Refills: 0 | Status: DISCONTINUED | OUTPATIENT
Start: 2019-03-23 | End: 2019-03-23

## 2019-03-23 RX ORDER — FAMOTIDINE 10 MG/ML
20 INJECTION INTRAVENOUS
Qty: 0 | Refills: 0 | Status: DISCONTINUED | OUTPATIENT
Start: 2019-03-23 | End: 2019-03-27

## 2019-03-23 RX ORDER — LANOLIN ALCOHOL/MO/W.PET/CERES
10 CREAM (GRAM) TOPICAL AT BEDTIME
Qty: 0 | Refills: 0 | Status: DISCONTINUED | OUTPATIENT
Start: 2019-03-23 | End: 2019-03-24

## 2019-03-23 RX ORDER — SODIUM CHLORIDE 9 MG/ML
1000 INJECTION, SOLUTION INTRAVENOUS
Qty: 0 | Refills: 0 | Status: DISCONTINUED | OUTPATIENT
Start: 2019-03-23 | End: 2019-03-25

## 2019-03-23 RX ADMIN — Medication 30 MILLILITER(S): at 13:18

## 2019-03-23 RX ADMIN — BENZOCAINE AND MENTHOL 1 LOZENGE: 5; 1 LIQUID ORAL at 13:18

## 2019-03-23 RX ADMIN — ONDANSETRON 4 MILLIGRAM(S): 8 TABLET, FILM COATED ORAL at 21:32

## 2019-03-23 RX ADMIN — SODIUM CHLORIDE 125 MILLILITER(S): 9 INJECTION, SOLUTION INTRAVENOUS at 13:18

## 2019-03-23 RX ADMIN — Medication 2: at 11:01

## 2019-03-23 RX ADMIN — Medication 100 MILLIEQUIVALENT(S): at 15:17

## 2019-03-23 RX ADMIN — ENOXAPARIN SODIUM 40 MILLIGRAM(S): 100 INJECTION SUBCUTANEOUS at 07:55

## 2019-03-23 RX ADMIN — Medication 100 MILLIEQUIVALENT(S): at 11:02

## 2019-03-23 RX ADMIN — BENZOCAINE AND MENTHOL 1 LOZENGE: 5; 1 LIQUID ORAL at 17:32

## 2019-03-23 RX ADMIN — SODIUM CHLORIDE 100 MILLILITER(S): 9 INJECTION, SOLUTION INTRAVENOUS at 22:14

## 2019-03-23 RX ADMIN — Medication 40 MILLIEQUIVALENT(S): at 13:18

## 2019-03-23 RX ADMIN — Medication 2: at 06:54

## 2019-03-23 RX ADMIN — Medication 100 MILLIEQUIVALENT(S): at 11:01

## 2019-03-23 RX ADMIN — SODIUM CHLORIDE 125 MILLILITER(S): 9 INJECTION, SOLUTION INTRAVENOUS at 11:18

## 2019-03-23 RX ADMIN — ONDANSETRON 4 MILLIGRAM(S): 8 TABLET, FILM COATED ORAL at 11:01

## 2019-03-23 RX ADMIN — Medication 50 GRAM(S): at 13:18

## 2019-03-23 RX ADMIN — Medication 133 MILLILITER(S): at 23:23

## 2019-03-23 NOTE — PROGRESS NOTE ADULT - SUBJECTIVE AND OBJECTIVE BOX
INTERVAL HPI/OVERNIGHT EVENTS/  SUBJECTIVE:    No acute events over night.     Patient's NGT was flushed multiple times overnight; patient denied nausea, ambulated; patient continues to drink large volumes of water and ice chips stating "it's all I have."  C/O sore throat and acid reflux pain. Afebrile.    MEDICATIONS  (STANDING):  benzocaine 15 mG/menthol 3.6 mG Lozenge 1 Lozenge Oral every 6 hours  dextrose 5% + lactated ringers. 1000 milliLiter(s) (125 mL/Hr) IV Continuous <Continuous>  dextrose 5%. 1000 milliLiter(s) (50 mL/Hr) IV Continuous <Continuous>  dextrose 50% Injectable 12.5 Gram(s) IV Push once  dextrose 50% Injectable 25 Gram(s) IV Push once  dextrose 50% Injectable 25 Gram(s) IV Push once  enoxaparin Injectable 40 milliGRAM(s) SubCutaneous every 24 hours  insulin lispro (HumaLOG) corrective regimen sliding scale   SubCutaneous every 6 hours    MEDICATIONS  (PRN):  acetaminophen  IVPB .. 1000 milliGRAM(s) IV Intermittent once PRN Mild Pain (1 - 3)  benzocaine 15 mG/menthol 3.6 mG (Sugar-Free) Lozenge 1 Lozenge Oral three times a day PRN Sore Throat  dextrose 40% Gel 15 Gram(s) Oral once PRN Blood Glucose LESS THAN 70 milliGRAM(s)/deciliter  glucagon  Injectable 1 milliGRAM(s) IntraMuscular once PRN Glucose LESS THAN 70 milligrams/deciliter  HYDROmorphone  Injectable 0.5 milliGRAM(s) IV Push every 4 hours PRN Severe Pain (7 - 10)  ondansetron Injectable 4 milliGRAM(s) IV Push every 6 hours PRN Nausea    Vital Signs Last 24 Hrs  T(C): 37 (23 Mar 2019 08:44), Max: 37 (23 Mar 2019 08:44)  T(F): 98.6 (23 Mar 2019 08:44), Max: 98.6 (23 Mar 2019 08:44)  HR: 69 (23 Mar 2019 08:44) (69 - 76)  BP: 122/79 (23 Mar 2019 08:44) (120/79 - 140/93)  RR: 19 (23 Mar 2019 08:44) (17 - 19)  SpO2: 91% (23 Mar 2019 08:44) (90% - 92%)    Physical Exam  Gen: NAD lying in bed  HEENT: NGT in place with bilious output, functioning well  CV: +S1, S2  Pulm: No increased WOB  Abd: mildly distended, soft, diffusely tender to palpation, re rebound, no guarding  Ext: Moving all extremities    I&O's Detail    22 Mar 2019 07:01  -  23 Mar 2019 07:00  --------------------------------------------------------  IN:    dextrose 5% + lactated ringers.: 1500 mL  Total IN: 1500 mL    OUT:    Nasoenteral Tube: 2400 mL    Voided: 2 mL  Total OUT: 2402 mL    Total NET: -902 mL    LABS:                        15.1   8.8   )-----------( 408      ( 22 Mar 2019 10:11 )             46.1     03-22    141  |  89<L>  |  25.0<H>  ----------------------------<  158<H>  3.2<L>   |  35.0<H>  |  0.66    Ca    9.9      22 Mar 2019 10:11  Phos  3.8     03-22  Mg     2.0     03-22    RADIOLOGY & ADDITIONAL STUDIES: No addl    62 y/o F w/ PMH of DM, HTN, HLD and multiple abdominal surgeries including abdominoplasty w/ post-op bleeding requiring ex-lap, hysterectomy, and ventral hernia repair with mesh presenting with abdominal bloating, nausea, and vomiting with evidence of SBO on imaging, now with NGT in place.    Plan:  - continue to monitor NGT output; continue flushes aggressively to ensure functionality  - JOJO  - f/u bowel function  - NPO/IVF for now  - discussed possibility of surgery should SBO not resolve within 48hrs  - pain control - Cepacol and maalox for sore throat and acid reflux   - OOB  - DVT ppx  -Serial KUBs as needed

## 2019-03-23 NOTE — PROGRESS NOTE ADULT - SUBJECTIVE AND OBJECTIVE BOX
Feels nausea   afebrile  abd Slight distention soft nontender no guarding or rebound  wbc 8.4  Ngt draining not much  cont NPO IVF  May need exoploration at some point   Pt aware of possible need for surgery if non improvement

## 2019-03-24 LAB
ANION GAP SERPL CALC-SCNC: 15 MMOL/L — SIGNIFICANT CHANGE UP (ref 5–17)
APTT BLD: 32.4 SEC — SIGNIFICANT CHANGE UP (ref 27.5–36.3)
BLD GP AB SCN SERPL QL: SIGNIFICANT CHANGE UP
BUN SERPL-MCNC: 16 MG/DL — SIGNIFICANT CHANGE UP (ref 8–20)
CALCIUM SERPL-MCNC: 9.2 MG/DL — SIGNIFICANT CHANGE UP (ref 8.6–10.2)
CHLORIDE SERPL-SCNC: 88 MMOL/L — LOW (ref 98–107)
CO2 SERPL-SCNC: 32 MMOL/L — HIGH (ref 22–29)
CREAT SERPL-MCNC: 0.73 MG/DL — SIGNIFICANT CHANGE UP (ref 0.5–1.3)
GLUCOSE BLDC GLUCOMTR-MCNC: 107 MG/DL — HIGH (ref 70–99)
GLUCOSE BLDC GLUCOMTR-MCNC: 131 MG/DL — HIGH (ref 70–99)
GLUCOSE BLDC GLUCOMTR-MCNC: 140 MG/DL — HIGH (ref 70–99)
GLUCOSE SERPL-MCNC: 152 MG/DL — HIGH (ref 70–115)
HCT VFR BLD CALC: 41.3 % — SIGNIFICANT CHANGE UP (ref 37–47)
HGB BLD-MCNC: 13.7 G/DL — SIGNIFICANT CHANGE UP (ref 12–16)
INR BLD: 1.07 RATIO — SIGNIFICANT CHANGE UP (ref 0.88–1.16)
MAGNESIUM SERPL-MCNC: 2.1 MG/DL — SIGNIFICANT CHANGE UP (ref 1.6–2.6)
MCHC RBC-ENTMCNC: 29.6 PG — SIGNIFICANT CHANGE UP (ref 27–31)
MCHC RBC-ENTMCNC: 33.2 G/DL — SIGNIFICANT CHANGE UP (ref 32–36)
MCV RBC AUTO: 89.2 FL — SIGNIFICANT CHANGE UP (ref 81–99)
PHOSPHATE SERPL-MCNC: 3.2 MG/DL — SIGNIFICANT CHANGE UP (ref 2.4–4.7)
PLATELET # BLD AUTO: 381 K/UL — SIGNIFICANT CHANGE UP (ref 150–400)
POTASSIUM SERPL-MCNC: 3.8 MMOL/L — SIGNIFICANT CHANGE UP (ref 3.5–5.3)
POTASSIUM SERPL-SCNC: 3.8 MMOL/L — SIGNIFICANT CHANGE UP (ref 3.5–5.3)
PROTHROM AB SERPL-ACNC: 12.3 SEC — SIGNIFICANT CHANGE UP (ref 10–12.9)
RBC # BLD: 4.63 M/UL — SIGNIFICANT CHANGE UP (ref 4.4–5.2)
RBC # FLD: 13.6 % — SIGNIFICANT CHANGE UP (ref 11–15.6)
SODIUM SERPL-SCNC: 135 MMOL/L — SIGNIFICANT CHANGE UP (ref 135–145)
TYPE + AB SCN PNL BLD: SIGNIFICANT CHANGE UP
WBC # BLD: 9.7 K/UL — SIGNIFICANT CHANGE UP (ref 4.8–10.8)
WBC # FLD AUTO: 9.7 K/UL — SIGNIFICANT CHANGE UP (ref 4.8–10.8)

## 2019-03-24 PROCEDURE — 74018 RADEX ABDOMEN 1 VIEW: CPT | Mod: 26

## 2019-03-24 PROCEDURE — 99231 SBSQ HOSP IP/OBS SF/LOW 25: CPT

## 2019-03-24 RX ORDER — LANOLIN ALCOHOL/MO/W.PET/CERES
10 CREAM (GRAM) TOPICAL AT BEDTIME
Qty: 0 | Refills: 0 | Status: DISCONTINUED | OUTPATIENT
Start: 2019-03-24 | End: 2019-03-27

## 2019-03-24 RX ADMIN — ONDANSETRON 4 MILLIGRAM(S): 8 TABLET, FILM COATED ORAL at 17:58

## 2019-03-24 RX ADMIN — SODIUM CHLORIDE 100 MILLILITER(S): 9 INJECTION, SOLUTION INTRAVENOUS at 06:34

## 2019-03-24 RX ADMIN — Medication 10 MILLIGRAM(S): at 22:05

## 2019-03-24 RX ADMIN — FAMOTIDINE 100 MILLIGRAM(S): 10 INJECTION INTRAVENOUS at 22:05

## 2019-03-24 NOTE — PROGRESS NOTE ADULT - SUBJECTIVE AND OBJECTIVE BOX
INTERVAL HPI/OVERNIGHT EVENTS: Patient received enemax1 overnight per patient request without bowel movement.     SUBJECTIVE: Feeling the same this AM compared to previous days. Wants surgery.       MEDICATIONS  (STANDING):  benzocaine 15 mG/menthol 3.6 mG Lozenge 1 Lozenge Oral every 6 hours  dextrose 5%. 1000 milliLiter(s) (50 mL/Hr) IV Continuous <Continuous>  dextrose 50% Injectable 12.5 Gram(s) IV Push once  dextrose 50% Injectable 25 Gram(s) IV Push once  dextrose 50% Injectable 25 Gram(s) IV Push once  enoxaparin Injectable 40 milliGRAM(s) SubCutaneous every 24 hours  insulin lispro (HumaLOG) corrective regimen sliding scale   SubCutaneous every 6 hours  melatonin Liquid 10 milliGRAM(s) Oral at bedtime  sodium chloride 0.9% 1000 milliLiter(s) (100 mL/Hr) IV Continuous <Continuous>    MEDICATIONS  (PRN):  acetaminophen  IVPB .. 1000 milliGRAM(s) IV Intermittent once PRN Mild Pain (1 - 3)  dextrose 40% Gel 15 Gram(s) Oral once PRN Blood Glucose LESS THAN 70 milliGRAM(s)/deciliter  famotidine  IVPB 20 milliGRAM(s) IV Intermittent two times a day PRN heartburn  glucagon  Injectable 1 milliGRAM(s) IntraMuscular once PRN Glucose LESS THAN 70 milligrams/deciliter  HYDROmorphone  Injectable 0.5 milliGRAM(s) IV Push every 4 hours PRN Severe Pain (7 - 10)  ondansetron Injectable 4 milliGRAM(s) IV Push every 6 hours PRN Nausea      Vital Signs Last 24 Hrs  T(C): 37.4 (23 Mar 2019 23:54), Max: 37.4 (23 Mar 2019 23:54)  T(F): 99.4 (23 Mar 2019 23:54), Max: 99.4 (23 Mar 2019 23:54)  HR: 78 (23 Mar 2019 23:54) (69 - 78)  BP: 116/81 (23 Mar 2019 23:54) (116/81 - 125/78)  BP(mean): --  RR: 18 (23 Mar 2019 23:54) (18 - 19)  SpO2: 91% (23 Mar 2019 23:54) (91% - 92%)    Physical Exam  Gen: NAD lying in bed  HEENT: NGT in place with bilious output, functioning well  CV: +S1, S2  Pulm: No increased WOB  Abd: mildly distended, soft, diffusely tender to palpation, re rebound, no guarding  Ext: Moving all extremities      I&O's Detail    22 Mar 2019 07:01  -  23 Mar 2019 07:00  --------------------------------------------------------  IN:    dextrose 5% + lactated ringers.: 1500 mL  Total IN: 1500 mL    OUT:    Nasoenteral Tube: 2400 mL    Voided: 2 mL  Total OUT: 2402 mL    Total NET: -902 mL      23 Mar 2019 07:01  -  24 Mar 2019 05:44  --------------------------------------------------------  IN:  Total IN: 0 mL    OUT:    Nasoenteral Tube: 1800 mL  Total OUT: 1800 mL    Total NET: -1800 mL          LABS:                        13.8   8.4   )-----------( 362      ( 23 Mar 2019 08:58 )             41.6     03-23    135  |  86<L>  |  16.0  ----------------------------<  129<H>  3.0<L>   |  38.0<H>  |  0.66    Ca    9.1      23 Mar 2019 18:11  Phos  3.2     03-23  Mg     1.7     03-23            RADIOLOGY & ADDITIONAL STUDIES:

## 2019-03-24 NOTE — PROGRESS NOTE ADULT - ASSESSMENT
64 y/o F w/ PMH of DM, HTN, HLD and multiple abdominal surgeries including abdominoplasty w/ post-op bleeding requiring ex-lap, hysterectomy, and ventral hernia repair with mesh presenting with abdominal bloating, nausea, and vomiting with evidence of SBO on imaging, now with NGT in place, has not clinically worsened, abd remains soft    Plan:  - continue to monitor NGT output; continue flushes aggressively to ensure functionality  - JOJO  - f/u bowel function  - NPO/IVF for now  - discussed possibility of surgery should SBO not resolve, patient amenable  - pain control - Cepacol and maalox for sore throat and acid reflux   - OOB  - DVT ppx  -Serial KUBs as needed

## 2019-03-24 NOTE — DIETITIAN INITIAL EVALUATION ADULT. - OTHER INFO
Pt admitted with small bowel obstruction. Pt NPO x 5 days, currently with NGT to suction. Aware of plan for possible surgery if no improvement.

## 2019-03-25 LAB
ANION GAP SERPL CALC-SCNC: 15 MMOL/L — SIGNIFICANT CHANGE UP (ref 5–17)
APTT BLD: 32.7 SEC — SIGNIFICANT CHANGE UP (ref 27.5–36.3)
BASOPHILS # BLD AUTO: 0 K/UL — SIGNIFICANT CHANGE UP (ref 0–0.2)
BASOPHILS NFR BLD AUTO: 0.2 % — SIGNIFICANT CHANGE UP (ref 0–2)
BUN SERPL-MCNC: 15 MG/DL — SIGNIFICANT CHANGE UP (ref 8–20)
CALCIUM SERPL-MCNC: 8.7 MG/DL — SIGNIFICANT CHANGE UP (ref 8.6–10.2)
CHLORIDE SERPL-SCNC: 91 MMOL/L — LOW (ref 98–107)
CO2 SERPL-SCNC: 28 MMOL/L — SIGNIFICANT CHANGE UP (ref 22–29)
CREAT SERPL-MCNC: 0.57 MG/DL — SIGNIFICANT CHANGE UP (ref 0.5–1.3)
EOSINOPHIL # BLD AUTO: 0.2 K/UL — SIGNIFICANT CHANGE UP (ref 0–0.5)
EOSINOPHIL NFR BLD AUTO: 2.1 % — SIGNIFICANT CHANGE UP (ref 0–6)
GLUCOSE BLDC GLUCOMTR-MCNC: 107 MG/DL — HIGH (ref 70–99)
GLUCOSE BLDC GLUCOMTR-MCNC: 160 MG/DL — HIGH (ref 70–99)
GLUCOSE BLDC GLUCOMTR-MCNC: 85 MG/DL — SIGNIFICANT CHANGE UP (ref 70–99)
GLUCOSE BLDC GLUCOMTR-MCNC: 94 MG/DL — SIGNIFICANT CHANGE UP (ref 70–99)
GLUCOSE BLDC GLUCOMTR-MCNC: 96 MG/DL — SIGNIFICANT CHANGE UP (ref 70–99)
GLUCOSE SERPL-MCNC: 103 MG/DL — SIGNIFICANT CHANGE UP (ref 70–115)
HCT VFR BLD CALC: 39.2 % — SIGNIFICANT CHANGE UP (ref 37–47)
HGB BLD-MCNC: 13.2 G/DL — SIGNIFICANT CHANGE UP (ref 12–16)
INR BLD: 1.08 RATIO — SIGNIFICANT CHANGE UP (ref 0.88–1.16)
LYMPHOCYTES # BLD AUTO: 2.5 K/UL — SIGNIFICANT CHANGE UP (ref 1–4.8)
LYMPHOCYTES # BLD AUTO: 26.1 % — SIGNIFICANT CHANGE UP (ref 20–55)
MAGNESIUM SERPL-MCNC: 1.9 MG/DL — SIGNIFICANT CHANGE UP (ref 1.6–2.6)
MCHC RBC-ENTMCNC: 29.8 PG — SIGNIFICANT CHANGE UP (ref 27–31)
MCHC RBC-ENTMCNC: 33.7 G/DL — SIGNIFICANT CHANGE UP (ref 32–36)
MCV RBC AUTO: 88.5 FL — SIGNIFICANT CHANGE UP (ref 81–99)
MONOCYTES # BLD AUTO: 0.9 K/UL — HIGH (ref 0–0.8)
MONOCYTES NFR BLD AUTO: 9.4 % — SIGNIFICANT CHANGE UP (ref 3–10)
NEUTROPHILS # BLD AUTO: 5.9 K/UL — SIGNIFICANT CHANGE UP (ref 1.8–8)
NEUTROPHILS NFR BLD AUTO: 61.9 % — SIGNIFICANT CHANGE UP (ref 37–73)
PHOSPHATE SERPL-MCNC: 3.1 MG/DL — SIGNIFICANT CHANGE UP (ref 2.4–4.7)
PLATELET # BLD AUTO: 364 K/UL — SIGNIFICANT CHANGE UP (ref 150–400)
POTASSIUM SERPL-MCNC: 3.4 MMOL/L — LOW (ref 3.5–5.3)
POTASSIUM SERPL-SCNC: 3.4 MMOL/L — LOW (ref 3.5–5.3)
PROTHROM AB SERPL-ACNC: 12.5 SEC — SIGNIFICANT CHANGE UP (ref 10–12.9)
RBC # BLD: 4.43 M/UL — SIGNIFICANT CHANGE UP (ref 4.4–5.2)
RBC # FLD: 13.4 % — SIGNIFICANT CHANGE UP (ref 11–15.6)
SODIUM SERPL-SCNC: 134 MMOL/L — LOW (ref 135–145)
WBC # BLD: 9.6 K/UL — SIGNIFICANT CHANGE UP (ref 4.8–10.8)
WBC # FLD AUTO: 9.6 K/UL — SIGNIFICANT CHANGE UP (ref 4.8–10.8)

## 2019-03-25 PROCEDURE — 99231 SBSQ HOSP IP/OBS SF/LOW 25: CPT

## 2019-03-25 PROCEDURE — 74018 RADEX ABDOMEN 1 VIEW: CPT | Mod: 26

## 2019-03-25 RX ORDER — POTASSIUM CHLORIDE 20 MEQ
10 PACKET (EA) ORAL
Qty: 0 | Refills: 0 | Status: COMPLETED | OUTPATIENT
Start: 2019-03-25 | End: 2019-03-25

## 2019-03-25 RX ORDER — DEXTROSE MONOHYDRATE, SODIUM CHLORIDE, AND POTASSIUM CHLORIDE 50; .745; 4.5 G/1000ML; G/1000ML; G/1000ML
1000 INJECTION, SOLUTION INTRAVENOUS
Qty: 0 | Refills: 0 | Status: DISCONTINUED | OUTPATIENT
Start: 2019-03-25 | End: 2019-03-27

## 2019-03-25 RX ORDER — CHLORHEXIDINE GLUCONATE 213 G/1000ML
1 SOLUTION TOPICAL DAILY
Qty: 0 | Refills: 0 | Status: DISCONTINUED | OUTPATIENT
Start: 2019-03-25 | End: 2019-03-26

## 2019-03-25 RX ORDER — MAGNESIUM SULFATE 500 MG/ML
2 VIAL (ML) INJECTION ONCE
Qty: 0 | Refills: 0 | Status: COMPLETED | OUTPATIENT
Start: 2019-03-25 | End: 2019-03-25

## 2019-03-25 RX ADMIN — CHLORHEXIDINE GLUCONATE 1 APPLICATION(S): 213 SOLUTION TOPICAL at 07:30

## 2019-03-25 RX ADMIN — BENZOCAINE AND MENTHOL 1 LOZENGE: 5; 1 LIQUID ORAL at 16:07

## 2019-03-25 RX ADMIN — Medication 50 GRAM(S): at 17:00

## 2019-03-25 RX ADMIN — Medication 100 MILLIEQUIVALENT(S): at 09:19

## 2019-03-25 RX ADMIN — HYDROMORPHONE HYDROCHLORIDE 0.5 MILLIGRAM(S): 2 INJECTION INTRAMUSCULAR; INTRAVENOUS; SUBCUTANEOUS at 20:53

## 2019-03-25 RX ADMIN — Medication 100 MILLIEQUIVALENT(S): at 11:19

## 2019-03-25 RX ADMIN — Medication 100 MILLIEQUIVALENT(S): at 12:40

## 2019-03-25 RX ADMIN — DEXTROSE MONOHYDRATE, SODIUM CHLORIDE, AND POTASSIUM CHLORIDE 100 MILLILITER(S): 50; .745; 4.5 INJECTION, SOLUTION INTRAVENOUS at 16:47

## 2019-03-25 RX ADMIN — Medication 2: at 23:42

## 2019-03-25 RX ADMIN — Medication 10 MILLIGRAM(S): at 23:42

## 2019-03-25 RX ADMIN — HYDROMORPHONE HYDROCHLORIDE 0.5 MILLIGRAM(S): 2 INJECTION INTRAMUSCULAR; INTRAVENOUS; SUBCUTANEOUS at 20:38

## 2019-03-25 NOTE — PROGRESS NOTE ADULT - SUBJECTIVE AND OBJECTIVE BOX
Patient seen at bedside this morning. Patient continues to pass significant flatus. No bowel movement yet. NGT in place with 400cc bilious out over 24 hours. Repeat KUB this morning demonstrated less distended loops of bowel. NGT flushed multiple times throughout the day. No concerns at this time     MEDICATIONS  (STANDING):  benzocaine 15 mG/menthol 3.6 mG Lozenge 1 Lozenge Oral every 6 hours  chlorhexidine 2% Cloths 1 Application(s) Topical daily  dextrose 5%. 1000 milliLiter(s) (50 mL/Hr) IV Continuous <Continuous>  dextrose 50% Injectable 12.5 Gram(s) IV Push once  dextrose 50% Injectable 25 Gram(s) IV Push once  dextrose 50% Injectable 25 Gram(s) IV Push once  enoxaparin Injectable 40 milliGRAM(s) SubCutaneous every 24 hours  insulin lispro (HumaLOG) corrective regimen sliding scale   SubCutaneous every 6 hours  melatonin 10 milliGRAM(s) Oral at bedtime  potassium chloride  10 mEq/100 mL IVPB 10 milliEquivalent(s) IV Intermittent every 1 hour  sodium chloride 0.9% 1000 milliLiter(s) (100 mL/Hr) IV Continuous <Continuous>    MEDICATIONS  (PRN):  acetaminophen  IVPB .. 1000 milliGRAM(s) IV Intermittent once PRN Mild Pain (1 - 3)  dextrose 40% Gel 15 Gram(s) Oral once PRN Blood Glucose LESS THAN 70 milliGRAM(s)/deciliter  famotidine  IVPB 20 milliGRAM(s) IV Intermittent two times a day PRN heartburn  glucagon  Injectable 1 milliGRAM(s) IntraMuscular once PRN Glucose LESS THAN 70 milligrams/deciliter  HYDROmorphone  Injectable 0.5 milliGRAM(s) IV Push every 4 hours PRN Severe Pain (7 - 10)  ondansetron Injectable 4 milliGRAM(s) IV Push every 6 hours PRN Nausea      Vital Signs Last 24 Hrs  T(C): 36.7 (25 Mar 2019 07:13), Max: 37.6 (25 Mar 2019 00:00)  T(F): 98 (25 Mar 2019 07:13), Max: 99.6 (25 Mar 2019 00:00)  HR: 72 (25 Mar 2019 07:13) (66 - 81)  BP: 124/86 (25 Mar 2019 07:13) (112/68 - 124/86)  BP(mean): --  RR: 18 (25 Mar 2019 07:13) (18 - 18)  SpO2: 97% (25 Mar 2019 07:13) (92% - 97%)    Physical Exam  Gen: NAD lying in bed  HEENT: NGT in place with bilious output, functioning well   CV: +S1, S2  Pulm: No increased WOB  Abd: mildly distended, soft, diffusely tender to palpation, re rebound, no guarding  Ext: Moving all extremities      I&O's Detail    24 Mar 2019 07:01  -  25 Mar 2019 07:00  --------------------------------------------------------  IN:  Total IN: 0 mL    OUT:    Nasoenteral Tube: 400 mL  Total OUT: 400 mL    Total NET: -400 mL          LABS:                        13.2   9.6   )-----------( 364      ( 25 Mar 2019 06:37 )             39.2     03-25    134<L>  |  91<L>  |  15.0  ----------------------------<  103  3.4<L>   |  28.0  |  0.57    Ca    8.7      25 Mar 2019 06:37  Phos  3.1     03-25  Mg     1.9     03-25      PT/INR - ( 25 Mar 2019 06:37 )   PT: 12.5 sec;   INR: 1.08 ratio         PTT - ( 25 Mar 2019 06:37 )  PTT:32.7 sec      RADIOLOGY & ADDITIONAL STUDIES:

## 2019-03-25 NOTE — PROGRESS NOTE ADULT - ASSESSMENT
64 y/o F w/ PMH of DM, HTN, HLD and multiple abdominal surgeries including abdominoplasty w/ post-op bleeding requiring ex-lap, hysterectomy, and ventral hernia repair with mesh presenting with abdominal bloating, nausea, and vomiting with evidence of SBO on imaging, now with NGT in place, clinically improving     Plan:  - continue to monitor NGT output; continue flushes aggressively to ensure functionality  - JOJO  - f/u bowel function  - NPO/IVF for now  - pain control - Cepacol and maalox for sore throat and acid reflux   - OOB  - DVT ppx  -Serial KUBs as needed

## 2019-03-26 ENCOUNTER — TRANSCRIPTION ENCOUNTER (OUTPATIENT)
Age: 64
End: 2019-03-26

## 2019-03-26 LAB
ANION GAP SERPL CALC-SCNC: 15 MMOL/L — SIGNIFICANT CHANGE UP (ref 5–17)
BASOPHILS # BLD AUTO: 0 K/UL — SIGNIFICANT CHANGE UP (ref 0–0.2)
BLD GP AB SCN SERPL QL: SIGNIFICANT CHANGE UP
BUN SERPL-MCNC: 13 MG/DL — SIGNIFICANT CHANGE UP (ref 8–20)
CALCIUM SERPL-MCNC: 8.9 MG/DL — SIGNIFICANT CHANGE UP (ref 8.6–10.2)
CHLORIDE SERPL-SCNC: 93 MMOL/L — LOW (ref 98–107)
CO2 SERPL-SCNC: 27 MMOL/L — SIGNIFICANT CHANGE UP (ref 22–29)
CREAT SERPL-MCNC: 0.65 MG/DL — SIGNIFICANT CHANGE UP (ref 0.5–1.3)
EOSINOPHIL # BLD AUTO: 0.2 K/UL — SIGNIFICANT CHANGE UP (ref 0–0.5)
EOSINOPHIL NFR BLD AUTO: 2 % — SIGNIFICANT CHANGE UP (ref 0–5)
GLUCOSE BLDC GLUCOMTR-MCNC: 137 MG/DL — HIGH (ref 70–99)
GLUCOSE BLDC GLUCOMTR-MCNC: 138 MG/DL — HIGH (ref 70–99)
GLUCOSE BLDC GLUCOMTR-MCNC: 150 MG/DL — HIGH (ref 70–99)
GLUCOSE BLDC GLUCOMTR-MCNC: 160 MG/DL — HIGH (ref 70–99)
GLUCOSE SERPL-MCNC: 158 MG/DL — HIGH (ref 70–115)
HCT VFR BLD CALC: 40.6 % — SIGNIFICANT CHANGE UP (ref 37–47)
HGB BLD-MCNC: 13.4 G/DL — SIGNIFICANT CHANGE UP (ref 12–16)
LYMPHOCYTES # BLD AUTO: 2.6 K/UL — SIGNIFICANT CHANGE UP (ref 1–4.8)
LYMPHOCYTES # BLD AUTO: 25 % — SIGNIFICANT CHANGE UP (ref 20–55)
MAGNESIUM SERPL-MCNC: 2.2 MG/DL — SIGNIFICANT CHANGE UP (ref 1.8–2.6)
MCHC RBC-ENTMCNC: 29.6 PG — SIGNIFICANT CHANGE UP (ref 27–31)
MCHC RBC-ENTMCNC: 33 G/DL — SIGNIFICANT CHANGE UP (ref 32–36)
MCV RBC AUTO: 89.6 FL — SIGNIFICANT CHANGE UP (ref 81–99)
MONOCYTES # BLD AUTO: 1.1 K/UL — HIGH (ref 0–0.8)
MONOCYTES NFR BLD AUTO: 11 % — HIGH (ref 3–10)
NEUTROPHILS # BLD AUTO: 5.6 K/UL — SIGNIFICANT CHANGE UP (ref 1.8–8)
NEUTROPHILS NFR BLD AUTO: 59 % — SIGNIFICANT CHANGE UP (ref 37–73)
PLAT MORPH BLD: NORMAL — SIGNIFICANT CHANGE UP
PLATELET # BLD AUTO: 360 K/UL — SIGNIFICANT CHANGE UP (ref 150–400)
POTASSIUM SERPL-MCNC: 4.4 MMOL/L — SIGNIFICANT CHANGE UP (ref 3.5–5.3)
POTASSIUM SERPL-SCNC: 4.4 MMOL/L — SIGNIFICANT CHANGE UP (ref 3.5–5.3)
RBC # BLD: 4.53 M/UL — SIGNIFICANT CHANGE UP (ref 4.4–5.2)
RBC # FLD: 13.6 % — SIGNIFICANT CHANGE UP (ref 11–15.6)
RBC BLD AUTO: NORMAL — SIGNIFICANT CHANGE UP
SODIUM SERPL-SCNC: 135 MMOL/L — SIGNIFICANT CHANGE UP (ref 135–145)
TYPE + AB SCN PNL BLD: SIGNIFICANT CHANGE UP
VARIANT LYMPHS # BLD: 3 % — SIGNIFICANT CHANGE UP (ref 0–6)
WBC # BLD: 9.6 K/UL — SIGNIFICANT CHANGE UP (ref 4.8–10.8)
WBC # FLD AUTO: 9.6 K/UL — SIGNIFICANT CHANGE UP (ref 4.8–10.8)

## 2019-03-26 PROCEDURE — 74018 RADEX ABDOMEN 1 VIEW: CPT | Mod: 26

## 2019-03-26 PROCEDURE — 74018 RADEX ABDOMEN 1 VIEW: CPT | Mod: 26,77

## 2019-03-26 RX ADMIN — HYDROMORPHONE HYDROCHLORIDE 0.5 MILLIGRAM(S): 2 INJECTION INTRAMUSCULAR; INTRAVENOUS; SUBCUTANEOUS at 13:42

## 2019-03-26 RX ADMIN — HYDROMORPHONE HYDROCHLORIDE 0.5 MILLIGRAM(S): 2 INJECTION INTRAMUSCULAR; INTRAVENOUS; SUBCUTANEOUS at 13:55

## 2019-03-26 RX ADMIN — ENOXAPARIN SODIUM 40 MILLIGRAM(S): 100 INJECTION SUBCUTANEOUS at 06:40

## 2019-03-26 RX ADMIN — Medication 2: at 18:19

## 2019-03-26 RX ADMIN — FAMOTIDINE 100 MILLIGRAM(S): 10 INJECTION INTRAVENOUS at 22:19

## 2019-03-26 RX ADMIN — DEXTROSE MONOHYDRATE, SODIUM CHLORIDE, AND POTASSIUM CHLORIDE 100 MILLILITER(S): 50; .745; 4.5 INJECTION, SOLUTION INTRAVENOUS at 12:54

## 2019-03-26 RX ADMIN — HYDROMORPHONE HYDROCHLORIDE 0.5 MILLIGRAM(S): 2 INJECTION INTRAMUSCULAR; INTRAVENOUS; SUBCUTANEOUS at 02:25

## 2019-03-26 RX ADMIN — HYDROMORPHONE HYDROCHLORIDE 0.5 MILLIGRAM(S): 2 INJECTION INTRAMUSCULAR; INTRAVENOUS; SUBCUTANEOUS at 02:40

## 2019-03-26 RX ADMIN — Medication 10 MILLIGRAM(S): at 23:14

## 2019-03-26 NOTE — PROGRESS NOTE ADULT - ASSESSMENT
64 y/o F w/ PMH of DM, HTN, HLD and multiple abdominal surgeries including abdominoplasty w/ post-op bleeding requiring ex-lap, hysterectomy, and ventral hernia repair with mesh presenting with abdominal bloating, nausea, and vomiting with evidence of SBO on imaging, patient appeared to progress clinically yesterday so NGT was removed however patient had another episode of emesis w/abdominal distension and nausea    Plan:  - NGT if vomits this AM  - f/u AM KUB  - JOJO  - f/u bowel function  - NPO/IVF for now  - discussed possibility of surgery should SBO not resolve, patient amenable  - if patient continues to clinically worsen, may add on for OR  - pain control - Cepacol and maalox for sore throat and acid reflux   - OOB  - DVT ppx

## 2019-03-26 NOTE — PROGRESS NOTE ADULT - SUBJECTIVE AND OBJECTIVE BOX
The patient is a 63y old  female who presents with a complaint of nausea and vomiting and  abdominal pain for the past10 days consistent with a diagnosis of small bowel obstruction.   (26 Mar 2019 04:45)      PAST MEDICAL HISTORY:  Diabetes  Obesity  Hypertension  Hyperlipidemia    PAST SURGICAL HISTORY:  Hysterectomy  Tummy Tuck  Hernia repair x 3  Cholecystectomy  Breast lumpectomy  Status post abdominoplasty      MEDICATIONS  (STANDING):  benzocaine 15 mG/menthol 3.6 mG Lozenge 1 Lozenge Oral every 6 hours  dextrose 5% + sodium chloride 0.45% with potassium chloride 20 mEq/L 1000 milliLiter(s) (100 mL/Hr) IV Continuous <Continuous>  dextrose 5%. 1000 milliLiter(s) (50 mL/Hr) IV Continuous <Continuous>  enoxaparin Injectable 40 milliGRAM(s) SubCutaneous every 24 hours  insulin lispro (HumaLOG) corrective regimen sliding scale   SubCutaneous every 6 hours  melatonin 10 milliGRAM(s) Oral at bedtime    MEDICATIONS  (PRN):  acetaminophen  IVPB .. 1000 milliGRAM(s) IV Intermittent once PRN Mild Pain (1 - 3)  famotidine  IVPB 20 milliGRAM(s) IV Intermittent two times a day PRN heartburn  HYDROmorphone  Injectable 0.5 milliGRAM(s) IV Push every 4 hours PRN Severe Pain (7 - 10)      Allergies;     Vicodin (itchy)      Social History:     The patient likes to drink Scotch or wine on weekends.  She doesn't smoke                             or  use illicit drugs.                        13.4   9.6   )-----------( 360      ( 26 Mar 2019 07:27 )             40.6     PT/INR - ( 25 Mar 2019 06:37 )   PT: 12.5 sec;   INR: 1.08 ratio       PTT - ( 25 Mar 2019 06:37 )  PTT:32.7 sec    03-26    135  |  93<L>  |  13.0  ----------------------------<  158<H>  4.4   |  27.0  |  0.65    Ca    8.9      26 Mar 2019 07:27  Phos  3.1     03-25  Mg     2.2     03-26    EKG:  -  3/19/2019  Normal sinus rhythm  Low voltage QRS  Possible Inferior infarct , age undetermined  Abnormal ECG    TT ECHO:  -  None    CT ABDOMEN & PELVIS  -  3/18/2019  IMPRESSION:   Small bowel obstruction. Mild perihepatic ascites and trace interloop   mesenteric edema. No abnormal bowel wall thickening, pneumatosis or free   air.    ASA # = 3  Mallampati # = 3 The patient is a 63y old  female who presents with a complaint of nausea and vomiting and  abdominal pain for the past10 days consistent with a diagnosis of small bowel obstruction.  She is scheduled to have an EXPLORATORY LAPAROTOMY.   (26 Mar 2019 04:45)      PAST MEDICAL HISTORY:  Diabetes  Obesity  Hypertension  Hyperlipidemia    PAST SURGICAL HISTORY:  Hysterectomy  Tummy Tuck  Hernia repair x 3  Cholecystectomy  Breast lumpectomy  Status post abdominoplasty      MEDICATIONS  (STANDING):  benzocaine 15 mG/menthol 3.6 mG Lozenge 1 Lozenge Oral every 6 hours  dextrose 5% + sodium chloride 0.45% with potassium chloride 20 mEq/L 1000 milliLiter(s) (100 mL/Hr) IV Continuous <Continuous>  dextrose 5%. 1000 milliLiter(s) (50 mL/Hr) IV Continuous <Continuous>  enoxaparin Injectable 40 milliGRAM(s) SubCutaneous every 24 hours  insulin lispro (HumaLOG) corrective regimen sliding scale   SubCutaneous every 6 hours  melatonin 10 milliGRAM(s) Oral at bedtime    MEDICATIONS  (PRN):  acetaminophen  IVPB .. 1000 milliGRAM(s) IV Intermittent once PRN Mild Pain (1 - 3)  famotidine  IVPB 20 milliGRAM(s) IV Intermittent two times a day PRN heartburn  HYDROmorphone  Injectable 0.5 milliGRAM(s) IV Push every 4 hours PRN Severe Pain (7 - 10)      Allergies;     Vicodin (itchy)      Social History:     The patient likes to drink Scotch or wine on weekends.  She doesn't smoke                             or  use illicit drugs.                        13.4   9.6   )-----------( 360      ( 26 Mar 2019 07:27 )             40.6     PT/INR - ( 25 Mar 2019 06:37 )   PT: 12.5 sec;   INR: 1.08 ratio       PTT - ( 25 Mar 2019 06:37 )  PTT:32.7 sec    03-26    135  |  93<L>  |  13.0  ----------------------------<  158<H>  4.4   |  27.0  |  0.65    Ca    8.9      26 Mar 2019 07:27  Phos  3.1     03-25  Mg     2.2     03-26    EKG:  -  3/19/2019  Normal sinus rhythm  Low voltage QRS  Possible Inferior infarct , age undetermined  Abnormal ECG    TT ECHO:  -  None    CT ABDOMEN & PELVIS  -  3/18/2019  IMPRESSION:   Small bowel obstruction. Mild perihepatic ascites and trace interloop   mesenteric edema. No abnormal bowel wall thickening, pneumatosis or free   air.    ASA # = 3  Mallampati # = 3

## 2019-03-26 NOTE — PROGRESS NOTE ADULT - SUBJECTIVE AND OBJECTIVE BOX
INTERVAL HPI/OVERNIGHT EVENTS: Patient passed flatus, KUB less obstructed, OR cancelled. NGT removed in PM. However overnight patient became more distended and had 1episode of emesis.     SUBJECTIVE: Still some nausea. Abdomen feeling distended      MEDICATIONS  (STANDING):  benzocaine 15 mG/menthol 3.6 mG Lozenge 1 Lozenge Oral every 6 hours  chlorhexidine 2% Cloths 1 Application(s) Topical daily  dextrose 5% + sodium chloride 0.45% with potassium chloride 20 mEq/L 1000 milliLiter(s) (100 mL/Hr) IV Continuous <Continuous>  dextrose 5%. 1000 milliLiter(s) (50 mL/Hr) IV Continuous <Continuous>  dextrose 50% Injectable 12.5 Gram(s) IV Push once  dextrose 50% Injectable 25 Gram(s) IV Push once  dextrose 50% Injectable 25 Gram(s) IV Push once  enoxaparin Injectable 40 milliGRAM(s) SubCutaneous every 24 hours  insulin lispro (HumaLOG) corrective regimen sliding scale   SubCutaneous every 6 hours  melatonin 10 milliGRAM(s) Oral at bedtime    MEDICATIONS  (PRN):  acetaminophen  IVPB .. 1000 milliGRAM(s) IV Intermittent once PRN Mild Pain (1 - 3)  dextrose 40% Gel 15 Gram(s) Oral once PRN Blood Glucose LESS THAN 70 milliGRAM(s)/deciliter  famotidine  IVPB 20 milliGRAM(s) IV Intermittent two times a day PRN heartburn  glucagon  Injectable 1 milliGRAM(s) IntraMuscular once PRN Glucose LESS THAN 70 milligrams/deciliter  HYDROmorphone  Injectable 0.5 milliGRAM(s) IV Push every 4 hours PRN Severe Pain (7 - 10)  ondansetron Injectable 4 milliGRAM(s) IV Push every 6 hours PRN Nausea      Vital Signs Last 24 Hrs  T(C): 36.8 (26 Mar 2019 00:02), Max: 36.9 (25 Mar 2019 16:33)  T(F): 98.2 (26 Mar 2019 00:02), Max: 98.5 (25 Mar 2019 16:33)  HR: 78 (26 Mar 2019 00:02) (71 - 93)  BP: 141/95 (26 Mar 2019 00:02) (124/86 - 142/94)  BP(mean): --  RR: 18 (26 Mar 2019 00:02) (18 - 20)  SpO2: 95% (26 Mar 2019 00:02) (95% - 97%)    Physical Exam  Gen: NAD lying in bed  CV: +S1, S2  Pulm: No increased WOB  Abd: more distended, soft, diffusely tender to palpation, no rebound, no guarding  Ext: Moving all extremities      I&O's Detail    24 Mar 2019 07:01  -  25 Mar 2019 07:00  --------------------------------------------------------  IN:    sodium chloride 0.9%: 100 mL  Total IN: 100 mL    OUT:    Nasoenteral Tube: 400 mL  Total OUT: 400 mL    Total NET: -300 mL      25 Mar 2019 07:01  -  26 Mar 2019 04:45  --------------------------------------------------------  IN:    dextrose 5% + sodium chloride 0.45% with potassium chloride 20 mEq/L: 1100 mL    sodium chloride 0.9%: 800 mL    Solution: 50 mL    Solution: 300 mL  Total IN: 2250 mL    OUT:  Total OUT: 0 mL    Total NET: 2250 mL          LABS:                        13.2   9.6   )-----------( 364      ( 25 Mar 2019 06:37 )             39.2     03-25    134<L>  |  91<L>  |  15.0  ----------------------------<  103  3.4<L>   |  28.0  |  0.57    Ca    8.7      25 Mar 2019 06:37  Phos  3.1     03-25  Mg     1.9     03-25      PT/INR - ( 25 Mar 2019 06:37 )   PT: 12.5 sec;   INR: 1.08 ratio         PTT - ( 25 Mar 2019 06:37 )  PTT:32.7 sec      RADIOLOGY & ADDITIONAL STUDIES:

## 2019-03-27 ENCOUNTER — RESULT REVIEW (OUTPATIENT)
Age: 64
End: 2019-03-27

## 2019-03-27 LAB
ANION GAP SERPL CALC-SCNC: 11 MMOL/L — SIGNIFICANT CHANGE UP (ref 5–17)
BUN SERPL-MCNC: 14 MG/DL — SIGNIFICANT CHANGE UP (ref 8–20)
CALCIUM SERPL-MCNC: 8.2 MG/DL — LOW (ref 8.6–10.2)
CHLORIDE SERPL-SCNC: 96 MMOL/L — LOW (ref 98–107)
CO2 SERPL-SCNC: 25 MMOL/L — SIGNIFICANT CHANGE UP (ref 22–29)
CREAT SERPL-MCNC: 0.98 MG/DL — SIGNIFICANT CHANGE UP (ref 0.5–1.3)
GLUCOSE BLDC GLUCOMTR-MCNC: 150 MG/DL — HIGH (ref 70–99)
GLUCOSE BLDC GLUCOMTR-MCNC: 154 MG/DL — HIGH (ref 70–99)
GLUCOSE BLDC GLUCOMTR-MCNC: 180 MG/DL — HIGH (ref 70–99)
GLUCOSE BLDC GLUCOMTR-MCNC: 247 MG/DL — HIGH (ref 70–99)
GLUCOSE SERPL-MCNC: 195 MG/DL — HIGH (ref 70–115)
HCT VFR BLD CALC: 47.9 % — HIGH (ref 37–47)
HGB BLD-MCNC: 15.9 G/DL — SIGNIFICANT CHANGE UP (ref 12–16)
MCHC RBC-ENTMCNC: 30 PG — SIGNIFICANT CHANGE UP (ref 27–31)
MCHC RBC-ENTMCNC: 33.2 G/DL — SIGNIFICANT CHANGE UP (ref 32–36)
MCV RBC AUTO: 90.4 FL — SIGNIFICANT CHANGE UP (ref 81–99)
PLATELET # BLD AUTO: 424 K/UL — HIGH (ref 150–400)
POTASSIUM SERPL-MCNC: 5 MMOL/L — SIGNIFICANT CHANGE UP (ref 3.5–5.3)
POTASSIUM SERPL-SCNC: 5 MMOL/L — SIGNIFICANT CHANGE UP (ref 3.5–5.3)
RBC # BLD: 5.3 M/UL — HIGH (ref 4.4–5.2)
RBC # FLD: 13.5 % — SIGNIFICANT CHANGE UP (ref 11–15.6)
SODIUM SERPL-SCNC: 132 MMOL/L — LOW (ref 135–145)
WBC # BLD: 18.3 K/UL — HIGH (ref 4.8–10.8)
WBC # FLD AUTO: 18.3 K/UL — HIGH (ref 4.8–10.8)

## 2019-03-27 PROCEDURE — 74018 RADEX ABDOMEN 1 VIEW: CPT | Mod: 26

## 2019-03-27 PROCEDURE — 44120 REMOVAL OF SMALL INTESTINE: CPT | Mod: AS

## 2019-03-27 PROCEDURE — 88307 TISSUE EXAM BY PATHOLOGIST: CPT | Mod: 26

## 2019-03-27 PROCEDURE — 44120 REMOVAL OF SMALL INTESTINE: CPT

## 2019-03-27 RX ORDER — FENTANYL CITRATE 50 UG/ML
25 INJECTION INTRAVENOUS
Qty: 0 | Refills: 0 | Status: DISCONTINUED | OUTPATIENT
Start: 2019-03-27 | End: 2019-03-27

## 2019-03-27 RX ORDER — NALOXONE HYDROCHLORIDE 4 MG/.1ML
0.1 SPRAY NASAL
Qty: 0 | Refills: 0 | Status: DISCONTINUED | OUTPATIENT
Start: 2019-03-27 | End: 2019-04-01

## 2019-03-27 RX ORDER — HYDROMORPHONE HYDROCHLORIDE 2 MG/ML
0.5 INJECTION INTRAMUSCULAR; INTRAVENOUS; SUBCUTANEOUS EVERY 6 HOURS
Qty: 0 | Refills: 0 | Status: DISCONTINUED | OUTPATIENT
Start: 2019-03-27 | End: 2019-03-29

## 2019-03-27 RX ORDER — SODIUM CHLORIDE 9 MG/ML
1000 INJECTION INTRAMUSCULAR; INTRAVENOUS; SUBCUTANEOUS
Qty: 0 | Refills: 0 | Status: DISCONTINUED | OUTPATIENT
Start: 2019-03-27 | End: 2019-04-01

## 2019-03-27 RX ORDER — HYDROMORPHONE HYDROCHLORIDE 2 MG/ML
30 INJECTION INTRAMUSCULAR; INTRAVENOUS; SUBCUTANEOUS
Qty: 0 | Refills: 0 | Status: DISCONTINUED | OUTPATIENT
Start: 2019-03-27 | End: 2019-03-29

## 2019-03-27 RX ORDER — BENZOCAINE AND MENTHOL 5; 1 G/100ML; G/100ML
1 LIQUID ORAL EVERY 6 HOURS
Qty: 0 | Refills: 0 | Status: DISCONTINUED | OUTPATIENT
Start: 2019-03-27 | End: 2019-04-01

## 2019-03-27 RX ORDER — INSULIN LISPRO 100/ML
VIAL (ML) SUBCUTANEOUS EVERY 6 HOURS
Qty: 0 | Refills: 0 | Status: DISCONTINUED | OUTPATIENT
Start: 2019-03-27 | End: 2019-04-01

## 2019-03-27 RX ORDER — FAMOTIDINE 10 MG/ML
20 INJECTION INTRAVENOUS
Qty: 0 | Refills: 0 | Status: DISCONTINUED | OUTPATIENT
Start: 2019-03-27 | End: 2019-04-01

## 2019-03-27 RX ORDER — SODIUM CHLORIDE 9 MG/ML
1000 INJECTION, SOLUTION INTRAVENOUS
Qty: 0 | Refills: 0 | Status: DISCONTINUED | OUTPATIENT
Start: 2019-03-27 | End: 2019-04-01

## 2019-03-27 RX ORDER — HYDROMORPHONE HYDROCHLORIDE 2 MG/ML
0.5 INJECTION INTRAMUSCULAR; INTRAVENOUS; SUBCUTANEOUS EVERY 4 HOURS
Qty: 0 | Refills: 0 | Status: DISCONTINUED | OUTPATIENT
Start: 2019-03-27 | End: 2019-03-29

## 2019-03-27 RX ORDER — ONDANSETRON 8 MG/1
4 TABLET, FILM COATED ORAL EVERY 6 HOURS
Qty: 0 | Refills: 0 | Status: DISCONTINUED | OUTPATIENT
Start: 2019-03-27 | End: 2019-04-01

## 2019-03-27 RX ORDER — SODIUM CHLORIDE 9 MG/ML
1000 INJECTION, SOLUTION INTRAVENOUS
Qty: 0 | Refills: 0 | Status: DISCONTINUED | OUTPATIENT
Start: 2019-03-27 | End: 2019-03-27

## 2019-03-27 RX ORDER — INSULIN HUMAN 100 [IU]/ML
6 INJECTION, SOLUTION SUBCUTANEOUS ONCE
Qty: 0 | Refills: 0 | Status: COMPLETED | OUTPATIENT
Start: 2019-03-27 | End: 2019-03-27

## 2019-03-27 RX ORDER — FENTANYL CITRATE 50 UG/ML
50 INJECTION INTRAVENOUS
Qty: 0 | Refills: 0 | Status: DISCONTINUED | OUTPATIENT
Start: 2019-03-27 | End: 2019-03-27

## 2019-03-27 RX ORDER — ENOXAPARIN SODIUM 100 MG/ML
40 INJECTION SUBCUTANEOUS DAILY
Qty: 0 | Refills: 0 | Status: DISCONTINUED | OUTPATIENT
Start: 2019-03-28 | End: 2019-04-01

## 2019-03-27 RX ORDER — ONDANSETRON 8 MG/1
4 TABLET, FILM COATED ORAL ONCE
Qty: 0 | Refills: 0 | Status: DISCONTINUED | OUTPATIENT
Start: 2019-03-27 | End: 2019-03-27

## 2019-03-27 RX ADMIN — FENTANYL CITRATE 50 MICROGRAM(S): 50 INJECTION INTRAVENOUS at 12:07

## 2019-03-27 RX ADMIN — Medication 2: at 18:23

## 2019-03-27 RX ADMIN — FENTANYL CITRATE 50 MICROGRAM(S): 50 INJECTION INTRAVENOUS at 11:27

## 2019-03-27 RX ADMIN — SODIUM CHLORIDE 125 MILLILITER(S): 9 INJECTION, SOLUTION INTRAVENOUS at 18:57

## 2019-03-27 RX ADMIN — FENTANYL CITRATE 50 MICROGRAM(S): 50 INJECTION INTRAVENOUS at 12:37

## 2019-03-27 RX ADMIN — FENTANYL CITRATE 50 MICROGRAM(S): 50 INJECTION INTRAVENOUS at 12:06

## 2019-03-27 RX ADMIN — INSULIN HUMAN 6 UNIT(S): 100 INJECTION, SOLUTION SUBCUTANEOUS at 11:21

## 2019-03-27 RX ADMIN — HYDROMORPHONE HYDROCHLORIDE 30 MILLILITER(S): 2 INJECTION INTRAMUSCULAR; INTRAVENOUS; SUBCUTANEOUS at 19:34

## 2019-03-27 RX ADMIN — FENTANYL CITRATE 50 MICROGRAM(S): 50 INJECTION INTRAVENOUS at 11:54

## 2019-03-27 RX ADMIN — FENTANYL CITRATE 50 MICROGRAM(S): 50 INJECTION INTRAVENOUS at 10:52

## 2019-03-27 RX ADMIN — BENZOCAINE AND MENTHOL 1 LOZENGE: 5; 1 LIQUID ORAL at 18:23

## 2019-03-27 RX ADMIN — DEXTROSE MONOHYDRATE, SODIUM CHLORIDE, AND POTASSIUM CHLORIDE 100 MILLILITER(S): 50; .745; 4.5 INJECTION, SOLUTION INTRAVENOUS at 00:37

## 2019-03-27 RX ADMIN — FAMOTIDINE 100 MILLIGRAM(S): 10 INJECTION INTRAVENOUS at 11:40

## 2019-03-27 RX ADMIN — Medication 2: at 05:10

## 2019-03-27 RX ADMIN — FENTANYL CITRATE 50 MICROGRAM(S): 50 INJECTION INTRAVENOUS at 11:55

## 2019-03-27 RX ADMIN — Medication 400 MILLIGRAM(S): at 01:58

## 2019-03-27 RX ADMIN — HYDROMORPHONE HYDROCHLORIDE 30 MILLILITER(S): 2 INJECTION INTRAMUSCULAR; INTRAVENOUS; SUBCUTANEOUS at 12:16

## 2019-03-27 RX ADMIN — FENTANYL CITRATE 50 MICROGRAM(S): 50 INJECTION INTRAVENOUS at 11:22

## 2019-03-27 NOTE — PROGRESS NOTE ADULT - SUBJECTIVE AND OBJECTIVE BOX
Patient seen at bedside. VSS, afebrile. No acute events over night. NGT placed during PM rounds. Denies flatus, denies BM. Scheduled for OR 3/27AM. No concerns at this time.   MEDICATIONS  (STANDING):  benzocaine 15 mG/menthol 3.6 mG Lozenge 1 Lozenge Oral every 6 hours  dextrose 5% + sodium chloride 0.45% with potassium chloride 20 mEq/L 1000 milliLiter(s) (100 mL/Hr) IV Continuous <Continuous>  dextrose 5%. 1000 milliLiter(s) (50 mL/Hr) IV Continuous <Continuous>  enoxaparin Injectable 40 milliGRAM(s) SubCutaneous every 24 hours  insulin lispro (HumaLOG) corrective regimen sliding scale   SubCutaneous every 6 hours  melatonin 10 milliGRAM(s) Oral at bedtime    MEDICATIONS  (PRN):  acetaminophen  IVPB .. 1000 milliGRAM(s) IV Intermittent once PRN Mild Pain (1 - 3)  famotidine  IVPB 20 milliGRAM(s) IV Intermittent two times a day PRN heartburn  HYDROmorphone  Injectable 0.5 milliGRAM(s) IV Push every 4 hours PRN Severe Pain (7 - 10)      Vital Signs Last 24 Hrs  T(C): 37.3 (26 Mar 2019 16:27), Max: 37.3 (26 Mar 2019 16:27)  T(F): 99.2 (26 Mar 2019 16:27), Max: 99.2 (26 Mar 2019 16:27)  HR: 72 (26 Mar 2019 16:27) (67 - 72)  BP: 118/77 (26 Mar 2019 16:27) (118/77 - 139/85)  BP(mean): --  RR: 18 (26 Mar 2019 16:27) (18 - 18)  SpO2: 85% (26 Mar 2019 16:27) (85% - 94%)    Physical Exam  Gen: NAD lying in bed  CV: +S1, S2  Pulm: No increased WOB  Abd: more distended, soft, diffusely tender to palpation, no rebound, no guarding  Ext: Moving all extremities    I&O's Detail    25 Mar 2019 07:01  -  26 Mar 2019 07:00  --------------------------------------------------------  IN:    dextrose 5% + sodium chloride 0.45% with potassium chloride 20 mEq/L: 1300 mL    sodium chloride 0.9%: 800 mL    Solution: 300 mL    Solution: 50 mL  Total IN: 2450 mL    OUT:  Total OUT: 0 mL    Total NET: 2450 mL      26 Mar 2019 07:01  -  27 Mar 2019 00:11  --------------------------------------------------------  IN:  Total IN: 0 mL    OUT:    Nasoenteral Tube: 800 mL  Total OUT: 800 mL    Total NET: -800 mL          LABS:                        13.4   9.6   )-----------( 360      ( 26 Mar 2019 07:27 )             40.6     03-26    135  |  93<L>  |  13.0  ----------------------------<  158<H>  4.4   |  27.0  |  0.65    Ca    8.9      26 Mar 2019 07:27  Phos  3.1     03-25  Mg     2.2     03-26      PT/INR - ( 25 Mar 2019 06:37 )   PT: 12.5 sec;   INR: 1.08 ratio         PTT - ( 25 Mar 2019 06:37 )  PTT:32.7 sec      RADIOLOGY & ADDITIONAL STUDIES:

## 2019-03-27 NOTE — BRIEF OPERATIVE NOTE - NSICDXBRIEFPROCEDURE_GEN_ALL_CORE_FT
PROCEDURES:  Open lysis of intestinal adhesions 27-Mar-2019 10:26:01  Boby Riddle  Laparotomy, exploratory, with small bowel resection 27-Mar-2019 10:24:00 with lysis of adhesions Boby Riddle

## 2019-03-27 NOTE — PROGRESS NOTE ADULT - ASSESSMENT
64 y/o F w/ PMH of DM, HTN, HLD and multiple abdominal surgeries including abdominoplasty w/ post-op bleeding requiring ex-lap, hysterectomy, and ventral hernia repair with mesh presenting with abdominal bloating, nausea, and vomiting with evidence of SBO on imaging, patient appeared to progress clinically Mon so NGT was removed however patient had another episode of emesis w/abdominal distension and nausea. NGT replaced yesterday    Plan:  - OR 3/27AM  - f/u AM KUB  - JOJO  - f/u bowel function  - pain control   - OOB  - DVT ppx

## 2019-03-27 NOTE — PROGRESS NOTE ADULT - ASSESSMENT
Assessment: 64 y/o F w/ PMH of DM, HTN, HLD and multiple abdominal surgeries including abdominoplasty w/ post-op bleeding requiring ex-lap, hysterectomy, and ventral hernia repair with mesh presenting with abdominal bloating, nausea, and vomiting with evidence of SBO on imaging, patient appeared to progress clinically Mon so NGT was removed however patient had another episode of emesis w/abdominal distension and nausea. NGT replaced yesterday. S/P exploratory open laparotomy with small bowel resection and lysis of adhesions.     Plan:  monitor NGT output  TOV   f/u PM labs  DVT ppx  NPO  continue PCA for pain control Assessment: 64 y/o F w/ PMH of DM, HTN, HLD and multiple abdominal surgeries including abdominoplasty w/ post-op bleeding requiring ex-lap, hysterectomy, and ventral hernia repair with mesh presenting with abdominal bloating, nausea, and vomiting with evidence of SBO on imaging, patient appeared to progress clinically Mon so NGT was removed however patient had another episode of emesis w/abdominal distension and nausea. NGT replaced yesterday. S/P exploratory open laparotomy with small bowel resection and lysis of adhesions.     Plan:  monitor NGT output  TOV   f/u PM labs  DVT ppx  IVF @ 125 ml/hr  NPO  continue PCA for pain control

## 2019-03-28 LAB
ANION GAP SERPL CALC-SCNC: 13 MMOL/L — SIGNIFICANT CHANGE UP (ref 5–17)
BASOPHILS # BLD AUTO: 0 K/UL — SIGNIFICANT CHANGE UP (ref 0–0.2)
BASOPHILS NFR BLD AUTO: 0.2 % — SIGNIFICANT CHANGE UP (ref 0–2)
BUN SERPL-MCNC: 12 MG/DL — SIGNIFICANT CHANGE UP (ref 8–20)
CALCIUM SERPL-MCNC: 8.6 MG/DL — SIGNIFICANT CHANGE UP (ref 8.6–10.2)
CHLORIDE SERPL-SCNC: 96 MMOL/L — LOW (ref 98–107)
CO2 SERPL-SCNC: 25 MMOL/L — SIGNIFICANT CHANGE UP (ref 22–29)
CREAT SERPL-MCNC: 0.83 MG/DL — SIGNIFICANT CHANGE UP (ref 0.5–1.3)
EOSINOPHIL # BLD AUTO: 0.1 K/UL — SIGNIFICANT CHANGE UP (ref 0–0.5)
EOSINOPHIL NFR BLD AUTO: 0.5 % — SIGNIFICANT CHANGE UP (ref 0–6)
GLUCOSE BLDC GLUCOMTR-MCNC: 114 MG/DL — HIGH (ref 70–99)
GLUCOSE BLDC GLUCOMTR-MCNC: 125 MG/DL — HIGH (ref 70–99)
GLUCOSE BLDC GLUCOMTR-MCNC: 135 MG/DL — HIGH (ref 70–99)
GLUCOSE BLDC GLUCOMTR-MCNC: 160 MG/DL — HIGH (ref 70–99)
GLUCOSE BLDC GLUCOMTR-MCNC: 87 MG/DL — SIGNIFICANT CHANGE UP (ref 70–99)
GLUCOSE SERPL-MCNC: 158 MG/DL — HIGH (ref 70–115)
HCT VFR BLD CALC: 42.8 % — SIGNIFICANT CHANGE UP (ref 37–47)
HGB BLD-MCNC: 14 G/DL — SIGNIFICANT CHANGE UP (ref 12–16)
LYMPHOCYTES # BLD AUTO: 1.5 K/UL — SIGNIFICANT CHANGE UP (ref 1–4.8)
LYMPHOCYTES # BLD AUTO: 11.1 % — LOW (ref 20–55)
MAGNESIUM SERPL-MCNC: 1.6 MG/DL — SIGNIFICANT CHANGE UP (ref 1.6–2.6)
MCHC RBC-ENTMCNC: 29.5 PG — SIGNIFICANT CHANGE UP (ref 27–31)
MCHC RBC-ENTMCNC: 32.7 G/DL — SIGNIFICANT CHANGE UP (ref 32–36)
MCV RBC AUTO: 90.3 FL — SIGNIFICANT CHANGE UP (ref 81–99)
MONOCYTES # BLD AUTO: 1.4 K/UL — HIGH (ref 0–0.8)
MONOCYTES NFR BLD AUTO: 10.2 % — HIGH (ref 3–10)
NEUTROPHILS # BLD AUTO: 10.4 K/UL — HIGH (ref 1.8–8)
NEUTROPHILS NFR BLD AUTO: 77.7 % — HIGH (ref 37–73)
PHOSPHATE SERPL-MCNC: 3.9 MG/DL — SIGNIFICANT CHANGE UP (ref 2.4–4.7)
PLATELET # BLD AUTO: 389 K/UL — SIGNIFICANT CHANGE UP (ref 150–400)
POTASSIUM SERPL-MCNC: 4.9 MMOL/L — SIGNIFICANT CHANGE UP (ref 3.5–5.3)
POTASSIUM SERPL-SCNC: 4.9 MMOL/L — SIGNIFICANT CHANGE UP (ref 3.5–5.3)
RBC # BLD: 4.74 M/UL — SIGNIFICANT CHANGE UP (ref 4.4–5.2)
RBC # FLD: 13.5 % — SIGNIFICANT CHANGE UP (ref 11–15.6)
SODIUM SERPL-SCNC: 134 MMOL/L — LOW (ref 135–145)
WBC # BLD: 13.3 K/UL — HIGH (ref 4.8–10.8)
WBC # FLD AUTO: 13.3 K/UL — HIGH (ref 4.8–10.8)

## 2019-03-28 RX ORDER — MAGNESIUM SULFATE 500 MG/ML
2 VIAL (ML) INJECTION ONCE
Qty: 0 | Refills: 0 | Status: COMPLETED | OUTPATIENT
Start: 2019-03-28 | End: 2019-03-28

## 2019-03-28 RX ADMIN — HYDROMORPHONE HYDROCHLORIDE 30 MILLILITER(S): 2 INJECTION INTRAMUSCULAR; INTRAVENOUS; SUBCUTANEOUS at 07:39

## 2019-03-28 RX ADMIN — Medication 2: at 00:48

## 2019-03-28 RX ADMIN — ENOXAPARIN SODIUM 40 MILLIGRAM(S): 100 INJECTION SUBCUTANEOUS at 11:16

## 2019-03-28 RX ADMIN — Medication 50 GRAM(S): at 11:16

## 2019-03-28 RX ADMIN — HYDROMORPHONE HYDROCHLORIDE 30 MILLILITER(S): 2 INJECTION INTRAMUSCULAR; INTRAVENOUS; SUBCUTANEOUS at 22:51

## 2019-03-28 RX ADMIN — SODIUM CHLORIDE 125 MILLILITER(S): 9 INJECTION INTRAMUSCULAR; INTRAVENOUS; SUBCUTANEOUS at 21:47

## 2019-03-28 RX ADMIN — SODIUM CHLORIDE 125 MILLILITER(S): 9 INJECTION INTRAMUSCULAR; INTRAVENOUS; SUBCUTANEOUS at 11:16

## 2019-03-28 NOTE — PROGRESS NOTE ADULT - SUBJECTIVE AND OBJECTIVE BOX
INTERVAL HPI/OVERNIGHT EVENTS: Passed TOV after OR yesterday    SUBJECTIVE: Feeling well this AM. No fevers/chills, no N/V. No flatus yet      MEDICATIONS  (STANDING):  benzocaine 15 mG/menthol 3.6 mG Lozenge 1 Lozenge Oral every 6 hours  dextrose 5%. 1000 milliLiter(s) (50 mL/Hr) IV Continuous <Continuous>  enoxaparin Injectable 40 milliGRAM(s) SubCutaneous daily  HYDROmorphone PCA (1 mG/mL) 30 milliLiter(s) PCA Continuous PCA Continuous  insulin lispro (HumaLOG) corrective regimen sliding scale   SubCutaneous every 6 hours  sodium chloride 0.9%. 1000 milliLiter(s) (125 mL/Hr) IV Continuous <Continuous>    MEDICATIONS  (PRN):  famotidine  IVPB 20 milliGRAM(s) IV Intermittent two times a day PRN heartburn  HYDROmorphone  Injectable 0.5 milliGRAM(s) IV Push every 6 hours PRN Mild Pain (1 - 3)  HYDROmorphone  Injectable 0.5 milliGRAM(s) IV Push every 4 hours PRN Moderate Pain (4 - 6)  naloxone Injectable 0.1 milliGRAM(s) IV Push every 3 minutes PRN For ANY of the following changes in patient status:  A. RR LESS THAN 10 breaths per minute, B. Oxygen saturation LESS THAN 90%, C. Sedation score of 6  ondansetron Injectable 4 milliGRAM(s) IV Push every 6 hours PRN Nausea      Vital Signs Last 24 Hrs  T(C): 36.8 (28 Mar 2019 07:50), Max: 36.8 (28 Mar 2019 07:50)  T(F): 98.2 (28 Mar 2019 07:50), Max: 98.2 (28 Mar 2019 07:50)  HR: 86 (28 Mar 2019 07:50) (86 - 108)  BP: 131/81 (28 Mar 2019 07:50) (94/69 - 131/81)  BP(mean): --  RR: 19 (28 Mar 2019 07:50) (9 - 21)  SpO2: 96% (27 Mar 2019 23:36) (96% - 99%)    Physical Exam:    Constitutional: NAD  HEENT: PERRL, EOMI  Neck: No JVD, FROM without pain  Respiratory: Respirations non-labored, no accessory muscle use  Cardiovascular: Regular rate & rhythm, S1, S2  Gastrointestinal: Midline incision dressing w/stable drainage, abdomen soft, appropriately tender, non-distended  Extremities: No peripheral edema, No cyanosis  Neurological: A&O x 3; without gross deficit  Musculoskeletal: No joint pain, swelling, deformity, or point tenderness; no limitation of movement    I&O's Detail    27 Mar 2019 07:01  -  28 Mar 2019 07:00  --------------------------------------------------------  IN:  Total IN: 0 mL    OUT:    Nasoenteral Tube: 1600 mL    Voided: 950 mL  Total OUT: 2550 mL    Total NET: -2550 mL          LABS:                        15.9   18.3  )-----------( 424      ( 27 Mar 2019 20:44 )             47.9     03-27    132<L>  |  96<L>  |  14.0  ----------------------------<  195<H>  5.0   |  25.0  |  0.98    Ca    8.2<L>      27 Mar 2019 20:44            RADIOLOGY & ADDITIONAL STUDIES:

## 2019-03-28 NOTE — PROGRESS NOTE ADULT - ASSESSMENT
Assessment: 62 y/o F w/ PMH of DM, HTN, HLD and multiple abdominal surgeries including abdominoplasty w/ post-op bleeding requiring ex-lap, hysterectomy, and ventral hernia repair with mesh presenting with abdominal bloating, nausea, and vomiting with evidence of SBO on imaging, patient appeared to progress clinically Mon so NGT was removed however patient had another episode of emesis w/abdominal distension and nausea. NGT replaced yesterday. POD1 S/P exploratory open laparotomy with small bowel resection and lysis of adhesions.     Plan:  monitor NGT output  DVT ppx  IVF @ 125 ml/hr  NPO  continue PCA for pain control

## 2019-03-28 NOTE — CHART NOTE - NSCHARTNOTEFT_GEN_A_CORE
Source: Patient [X]  Family [ ]   other [X] EMR    Current Diet:   Diet, NPO:   With Chewing Gum  With Hard Candy  With Ice Chips/Sips of Water (03-27-19 @ 13:41)    Patient reports [ ] nausea  [ ] vomiting [ ] diarrhea [ ] constipation  [ ]chewing problems [ ] swallowing issues  [ ] other: denies    Current Weight:   (3/18)   210.1lbs     % Weight Change: No recent weight documented     Pertinent Medications: MEDICATIONS  (STANDING):  benzocaine 15 mG/menthol 3.6 mG Lozenge 1 Lozenge Oral every 6 hours  dextrose 5%. 1000 milliLiter(s) (50 mL/Hr) IV Continuous <Continuous>  enoxaparin Injectable 40 milliGRAM(s) SubCutaneous daily  HYDROmorphone PCA (1 mG/mL) 30 milliLiter(s) PCA Continuous PCA Continuous  insulin lispro (HumaLOG) corrective regimen sliding scale   SubCutaneous every 6 hours  sodium chloride 0.9%. 1000 milliLiter(s) (125 mL/Hr) IV Continuous <Continuous>    MEDICATIONS  (PRN):  famotidine  IVPB 20 milliGRAM(s) IV Intermittent two times a day PRN heartburn  HYDROmorphone  Injectable 0.5 milliGRAM(s) IV Push every 6 hours PRN Mild Pain (1 - 3)  HYDROmorphone  Injectable 0.5 milliGRAM(s) IV Push every 4 hours PRN Moderate Pain (4 - 6)  naloxone Injectable 0.1 milliGRAM(s) IV Push every 3 minutes PRN For ANY of the following changes in patient status:  A. RR LESS THAN 10 breaths per minute, B. Oxygen saturation LESS THAN 90%, C. Sedation score of 6  ondansetron Injectable 4 milliGRAM(s) IV Push every 6 hours PRN Nausea    Pertinent Labs: CBC Full  -  ( 28 Mar 2019 07:51 )  WBC Count : 13.3 K/uL  RBC Count : 4.74 M/uL  Hemoglobin : 14.0 g/dL  Hematocrit : 42.8 %  Platelet Count - Automated : 389 K/uL  Mean Cell Volume : 90.3 fl  Mean Cell Hemoglobin : 29.5 pg  Mean Cell Hemoglobin Concentration : 32.7 g/dL  Auto Neutrophil # : 10.4 K/uL  Auto Lymphocyte # : 1.5 K/uL  Auto Monocyte # : 1.4 K/uL  Auto Eosinophil # : 0.1 K/uL  Auto Basophil # : 0.0 K/uL  Auto Neutrophil % : 77.7 %  Auto Lymphocyte % : 11.1 %  Auto Monocyte % : 10.2 %  Auto Eosinophil % : 0.5 %  Auto Basophil % : 0.2 %  03-28 Na134 mmol/L<L> Glu 158 mg/dL<H> K+ 4.9 mmol/L Cr  0.83 mg/dL BUN 12.0 mg/dL Phos 3.9 mg/dL Alb n/a   PAB n/a       Skin: surgical incision s/p ex-lap with small bowel resection and lysis of adhesion    Nutrition focused physical exam conducted - found signs of malnutrition [X]absent [ ]present    Subcutaneous fat loss: [ ] Orbital fat pads region, [ ]Buccal fat region, [ ]Triceps region,  [ ]Ribs region    Muscle wasting: [ ]Temples region, [ ]Clavicle region, [ ]Shoulder region, [ ]Scapula region, [ ]Interosseous region,  [ ]thigh region, [ ]Calf region    Estimated Needs:   [X] no change since previous assessment  [ ] recalculated:     Current Nutrition Diagnosis: Pt now with mild acute malnutrition related to medical intervention for SBO requiring NGT for suction, now s/p ex-lap with small bowel resection and lysis of adhesion as evidenced by NPO x 9 days, mild temple wasting noted. NGT continues with output, no BM/flatus as of yet. Pt has been NPO x 9 days, may need to consider alternate means of nutrition.     Recommendations:   May need to consider TPN/PPN as Pt has been NPO x 9 days   When medically feasible advance to CLD-->full liquid-->cons CHO   RD to remain available     Monitoring and Evaluation:   [ ] PO intake [ ] Tolerance to diet prescription [X] Weights  [X] Follow up per protocol [X] Labs:

## 2019-03-28 NOTE — CHART NOTE - NSCHARTNOTEFT_GEN_A_CORE
Upon Nutritional Assessment by the Registered Dietitian your patient was determined to meet criteria / has evidence of the following diagnosis/diagnoses:          [X]  Mild Acute Protein Calorie Malnutrition    Findings as based on:  •  Comprehensive nutrition assessment and consultation  •  Calorie counts (nutrient intake analysis)  •  Food acceptance and intake status from observations by staff  •  Follow up  •  Patient education  •  Intervention secondary to interdisciplinary rounds  •   concerns    Pt now with mild acute malnutrition related to medical intervention for SBO requiring NGT for suction, now s/p ex-lap with small bowel resection and lysis of adhesion as evidenced by NPO x 9 days, mild temple wasting noted. NGT continues with output, no BM/flatus as of yet. Pt has been NPO x 9 days, may need to consider alternate means of nutrition. RD to remain available.     Treatment:    The following diet has been recommended:  May need to consider TPN/PPN as Pt has been NPO x 9 days   When feasible, if able to tolerate, advance diet as tolerated CLD->fulls->cons CHO  RD to remain available.     PROVIDER Section:     By signing this assessment you are acknowledging and agree with the diagnosis/diagnoses assigned by the Registered Dietitian    Comments:

## 2019-03-29 LAB
ANION GAP SERPL CALC-SCNC: 14 MMOL/L — SIGNIFICANT CHANGE UP (ref 5–17)
BASOPHILS # BLD AUTO: 0 K/UL — SIGNIFICANT CHANGE UP (ref 0–0.2)
BASOPHILS NFR BLD AUTO: 0.2 % — SIGNIFICANT CHANGE UP (ref 0–2)
BUN SERPL-MCNC: 5 MG/DL — LOW (ref 8–20)
CALCIUM SERPL-MCNC: 8.6 MG/DL — SIGNIFICANT CHANGE UP (ref 8.6–10.2)
CHLORIDE SERPL-SCNC: 98 MMOL/L — SIGNIFICANT CHANGE UP (ref 98–107)
CO2 SERPL-SCNC: 23 MMOL/L — SIGNIFICANT CHANGE UP (ref 22–29)
CREAT SERPL-MCNC: 0.49 MG/DL — LOW (ref 0.5–1.3)
EOSINOPHIL # BLD AUTO: 0.4 K/UL — SIGNIFICANT CHANGE UP (ref 0–0.5)
EOSINOPHIL NFR BLD AUTO: 3.1 % — SIGNIFICANT CHANGE UP (ref 0–6)
GLUCOSE BLDC GLUCOMTR-MCNC: 141 MG/DL — HIGH (ref 70–99)
GLUCOSE BLDC GLUCOMTR-MCNC: 75 MG/DL — SIGNIFICANT CHANGE UP (ref 70–99)
GLUCOSE BLDC GLUCOMTR-MCNC: 86 MG/DL — SIGNIFICANT CHANGE UP (ref 70–99)
GLUCOSE BLDC GLUCOMTR-MCNC: 87 MG/DL — SIGNIFICANT CHANGE UP (ref 70–99)
GLUCOSE SERPL-MCNC: 88 MG/DL — SIGNIFICANT CHANGE UP (ref 70–115)
HCT VFR BLD CALC: 36.1 % — LOW (ref 37–47)
HGB BLD-MCNC: 11.8 G/DL — LOW (ref 12–16)
LYMPHOCYTES # BLD AUTO: 1.4 K/UL — SIGNIFICANT CHANGE UP (ref 1–4.8)
LYMPHOCYTES # BLD AUTO: 12.9 % — LOW (ref 20–55)
MAGNESIUM SERPL-MCNC: 1.6 MG/DL — SIGNIFICANT CHANGE UP (ref 1.6–2.6)
MCHC RBC-ENTMCNC: 29.3 PG — SIGNIFICANT CHANGE UP (ref 27–31)
MCHC RBC-ENTMCNC: 32.7 G/DL — SIGNIFICANT CHANGE UP (ref 32–36)
MCV RBC AUTO: 89.6 FL — SIGNIFICANT CHANGE UP (ref 81–99)
MONOCYTES # BLD AUTO: 1 K/UL — HIGH (ref 0–0.8)
MONOCYTES NFR BLD AUTO: 9.2 % — SIGNIFICANT CHANGE UP (ref 3–10)
NEUTROPHILS # BLD AUTO: 8.3 K/UL — HIGH (ref 1.8–8)
NEUTROPHILS NFR BLD AUTO: 74.3 % — HIGH (ref 37–73)
PHOSPHATE SERPL-MCNC: 1.8 MG/DL — LOW (ref 2.4–4.7)
PLATELET # BLD AUTO: 324 K/UL — SIGNIFICANT CHANGE UP (ref 150–400)
POTASSIUM SERPL-MCNC: 4.6 MMOL/L — SIGNIFICANT CHANGE UP (ref 3.5–5.3)
POTASSIUM SERPL-SCNC: 4.6 MMOL/L — SIGNIFICANT CHANGE UP (ref 3.5–5.3)
RBC # BLD: 4.03 M/UL — LOW (ref 4.4–5.2)
RBC # FLD: 13.4 % — SIGNIFICANT CHANGE UP (ref 11–15.6)
SODIUM SERPL-SCNC: 135 MMOL/L — SIGNIFICANT CHANGE UP (ref 135–145)
WBC # BLD: 11.1 K/UL — HIGH (ref 4.8–10.8)
WBC # FLD AUTO: 11.1 K/UL — HIGH (ref 4.8–10.8)

## 2019-03-29 RX ORDER — MAGNESIUM SULFATE 500 MG/ML
2 VIAL (ML) INJECTION ONCE
Qty: 0 | Refills: 0 | Status: COMPLETED | OUTPATIENT
Start: 2019-03-29 | End: 2019-03-29

## 2019-03-29 RX ORDER — HYDROMORPHONE HYDROCHLORIDE 2 MG/ML
1 INJECTION INTRAMUSCULAR; INTRAVENOUS; SUBCUTANEOUS EVERY 4 HOURS
Qty: 0 | Refills: 0 | Status: DISCONTINUED | OUTPATIENT
Start: 2019-03-29 | End: 2019-03-29

## 2019-03-29 RX ORDER — LANOLIN ALCOHOL/MO/W.PET/CERES
3 CREAM (GRAM) TOPICAL AT BEDTIME
Qty: 0 | Refills: 0 | Status: DISCONTINUED | OUTPATIENT
Start: 2019-03-29 | End: 2019-04-01

## 2019-03-29 RX ORDER — HYDROMORPHONE HYDROCHLORIDE 2 MG/ML
0.5 INJECTION INTRAMUSCULAR; INTRAVENOUS; SUBCUTANEOUS EVERY 4 HOURS
Qty: 0 | Refills: 0 | Status: DISCONTINUED | OUTPATIENT
Start: 2019-03-29 | End: 2019-03-29

## 2019-03-29 RX ORDER — OXYCODONE HYDROCHLORIDE 5 MG/1
5 TABLET ORAL EVERY 6 HOURS
Qty: 0 | Refills: 0 | Status: DISCONTINUED | OUTPATIENT
Start: 2019-03-29 | End: 2019-03-30

## 2019-03-29 RX ORDER — OXYCODONE HYDROCHLORIDE 5 MG/1
10 TABLET ORAL EVERY 6 HOURS
Qty: 0 | Refills: 0 | Status: DISCONTINUED | OUTPATIENT
Start: 2019-03-29 | End: 2019-03-30

## 2019-03-29 RX ORDER — HYDROMORPHONE HYDROCHLORIDE 2 MG/ML
0.5 INJECTION INTRAMUSCULAR; INTRAVENOUS; SUBCUTANEOUS EVERY 6 HOURS
Qty: 0 | Refills: 0 | Status: DISCONTINUED | OUTPATIENT
Start: 2019-03-29 | End: 2019-03-30

## 2019-03-29 RX ORDER — ACETAMINOPHEN 500 MG
1000 TABLET ORAL ONCE
Qty: 0 | Refills: 0 | Status: DISCONTINUED | OUTPATIENT
Start: 2019-03-29 | End: 2019-03-29

## 2019-03-29 RX ORDER — ACETAMINOPHEN 500 MG
650 TABLET ORAL EVERY 6 HOURS
Qty: 0 | Refills: 0 | Status: DISCONTINUED | OUTPATIENT
Start: 2019-03-29 | End: 2019-04-01

## 2019-03-29 RX ADMIN — FAMOTIDINE 100 MILLIGRAM(S): 10 INJECTION INTRAVENOUS at 12:19

## 2019-03-29 RX ADMIN — Medication 85 MILLIMOLE(S): at 15:38

## 2019-03-29 RX ADMIN — SODIUM CHLORIDE 125 MILLILITER(S): 9 INJECTION INTRAMUSCULAR; INTRAVENOUS; SUBCUTANEOUS at 15:39

## 2019-03-29 RX ADMIN — ENOXAPARIN SODIUM 40 MILLIGRAM(S): 100 INJECTION SUBCUTANEOUS at 12:20

## 2019-03-29 RX ADMIN — Medication 50 GRAM(S): at 21:41

## 2019-03-29 RX ADMIN — HYDROMORPHONE HYDROCHLORIDE 1 MILLIGRAM(S): 2 INJECTION INTRAMUSCULAR; INTRAVENOUS; SUBCUTANEOUS at 16:10

## 2019-03-29 RX ADMIN — HYDROMORPHONE HYDROCHLORIDE 1 MILLIGRAM(S): 2 INJECTION INTRAMUSCULAR; INTRAVENOUS; SUBCUTANEOUS at 15:38

## 2019-03-29 RX ADMIN — Medication 3 MILLIGRAM(S): at 23:47

## 2019-03-29 NOTE — PROGRESS NOTE ADULT - SUBJECTIVE AND OBJECTIVE BOX
INTERVAL HPI/OVERNIGHT EVENTS: No acute events overnight.     SUBJECTIVE: Feeling well this AM. No fevers/chills, no N/V. Still no flatus      MEDICATIONS  (STANDING):  benzocaine 15 mG/menthol 3.6 mG Lozenge 1 Lozenge Oral every 6 hours  dextrose 5%. 1000 milliLiter(s) (50 mL/Hr) IV Continuous <Continuous>  enoxaparin Injectable 40 milliGRAM(s) SubCutaneous daily  insulin lispro (HumaLOG) corrective regimen sliding scale   SubCutaneous every 6 hours  magnesium sulfate  IVPB 2 Gram(s) IV Intermittent once  sodium chloride 0.9%. 1000 milliLiter(s) (125 mL/Hr) IV Continuous <Continuous>  sodium phosphate IVPB 30 milliMole(s) IV Intermittent once    MEDICATIONS  (PRN):  acetaminophen  IVPB .. 1000 milliGRAM(s) IV Intermittent once PRN Mild Pain (1 - 3)  famotidine  IVPB 20 milliGRAM(s) IV Intermittent two times a day PRN heartburn  HYDROmorphone  Injectable 0.5 milliGRAM(s) IV Push every 4 hours PRN Moderate Pain (4 - 6)  HYDROmorphone  Injectable 1 milliGRAM(s) IV Push every 4 hours PRN Severe Pain (7 - 10)  naloxone Injectable 0.1 milliGRAM(s) IV Push every 3 minutes PRN For ANY of the following changes in patient status:  A. RR LESS THAN 10 breaths per minute, B. Oxygen saturation LESS THAN 90%, C. Sedation score of 6  ondansetron Injectable 4 milliGRAM(s) IV Push every 6 hours PRN Nausea      Vital Signs Last 24 Hrs  T(C): 37.4 (29 Mar 2019 08:30), Max: 37.4 (29 Mar 2019 08:30)  T(F): 99.4 (29 Mar 2019 08:30), Max: 99.4 (29 Mar 2019 08:30)  HR: 83 (29 Mar 2019 08:30) (83 - 105)  BP: 93/56 (29 Mar 2019 08:30) (93/56 - 154/102)  BP(mean): --  RR: 18 (29 Mar 2019 08:30) (18 - 18)  SpO2: 100% (28 Mar 2019 16:57) (100% - 100%)    Constitutional: NAD  HEENT: PERRL, EOMI, NGT in place  Neck: No JVD, FROM without pain  Respiratory: Respirations non-labored, no accessory muscle use  Cardiovascular: Regular rate & rhythm, S1, S2  Gastrointestinal: Midline incision dressing w/stable drainage, abdomen soft, appropriately tender, non-distended  Extremities: No peripheral edema, No cyanosis  Neurological: A&O x 3; without gross deficit  Musculoskeletal: No joint pain, swelling, deformity, or point tenderness; no limitation of movement      I&O's Detail    28 Mar 2019 07:01  -  29 Mar 2019 07:00  --------------------------------------------------------  IN:    sodium chloride 0.9%.: 2875 mL  Total IN: 2875 mL    OUT:    Nasoenteral Tube: 100 mL    Voided: 800 mL  Total OUT: 900 mL    Total NET: 1975 mL          LABS:                        11.8   11.1  )-----------( 324      ( 29 Mar 2019 10:22 )             36.1     03-29    135  |  98  |  5.0<L>  ----------------------------<  88  4.6   |  23.0  |  0.49<L>    Ca    8.6      29 Mar 2019 10:22  Phos  1.8     03-29  Mg     1.6     03-29            RADIOLOGY & ADDITIONAL STUDIES:

## 2019-03-29 NOTE — PROGRESS NOTE ADULT - ASSESSMENT
Assessment: 64 y/o F w/ PMH of DM, HTN, HLD and multiple abdominal surgeries including abdominoplasty w/ post-op bleeding requiring ex-lap, hysterectomy, and ventral hernia repair with mesh presenting with abdominal bloating, nausea, and vomiting with evidence of SBO on imaging, patient appeared to progress clinically Mon so NGT was removed however patient had another episode of emesis w/abdominal distension and nausea. NGT replaced yesterday. POD2 S/P exploratory open laparotomy with small bowel resection and lysis of adhesions.     Plan:  monitor NGT output, to be kept in place until passing flatus  DVT ppx  IVF @ 125 ml/hr  NPO  DC PCA, PRNs in place

## 2019-03-30 LAB
ANION GAP SERPL CALC-SCNC: 13 MMOL/L — SIGNIFICANT CHANGE UP (ref 5–17)
BASOPHILS # BLD AUTO: 0 K/UL — SIGNIFICANT CHANGE UP (ref 0–0.2)
BASOPHILS NFR BLD AUTO: 0.1 % — SIGNIFICANT CHANGE UP (ref 0–2)
BUN SERPL-MCNC: <3 MG/DL — LOW (ref 8–20)
CALCIUM SERPL-MCNC: 8.7 MG/DL — SIGNIFICANT CHANGE UP (ref 8.6–10.2)
CHLORIDE SERPL-SCNC: 98 MMOL/L — SIGNIFICANT CHANGE UP (ref 98–107)
CO2 SERPL-SCNC: 24 MMOL/L — SIGNIFICANT CHANGE UP (ref 22–29)
CREAT SERPL-MCNC: 0.49 MG/DL — LOW (ref 0.5–1.3)
EOSINOPHIL # BLD AUTO: 0.4 K/UL — SIGNIFICANT CHANGE UP (ref 0–0.5)
EOSINOPHIL NFR BLD AUTO: 4 % — SIGNIFICANT CHANGE UP (ref 0–6)
GLUCOSE BLDC GLUCOMTR-MCNC: 102 MG/DL — HIGH (ref 70–99)
GLUCOSE BLDC GLUCOMTR-MCNC: 123 MG/DL — HIGH (ref 70–99)
GLUCOSE BLDC GLUCOMTR-MCNC: 169 MG/DL — HIGH (ref 70–99)
GLUCOSE BLDC GLUCOMTR-MCNC: 93 MG/DL — SIGNIFICANT CHANGE UP (ref 70–99)
GLUCOSE SERPL-MCNC: 122 MG/DL — HIGH (ref 70–115)
HCT VFR BLD CALC: 35.5 % — LOW (ref 37–47)
HGB BLD-MCNC: 11.9 G/DL — LOW (ref 12–16)
LYMPHOCYTES # BLD AUTO: 1.4 K/UL — SIGNIFICANT CHANGE UP (ref 1–4.8)
LYMPHOCYTES # BLD AUTO: 14 % — LOW (ref 20–55)
MAGNESIUM SERPL-MCNC: 1.8 MG/DL — SIGNIFICANT CHANGE UP (ref 1.6–2.6)
MCHC RBC-ENTMCNC: 29.5 PG — SIGNIFICANT CHANGE UP (ref 27–31)
MCHC RBC-ENTMCNC: 33.5 G/DL — SIGNIFICANT CHANGE UP (ref 32–36)
MCV RBC AUTO: 87.9 FL — SIGNIFICANT CHANGE UP (ref 81–99)
MONOCYTES # BLD AUTO: 1 K/UL — HIGH (ref 0–0.8)
MONOCYTES NFR BLD AUTO: 9.7 % — SIGNIFICANT CHANGE UP (ref 3–10)
NEUTROPHILS # BLD AUTO: 7.5 K/UL — SIGNIFICANT CHANGE UP (ref 1.8–8)
NEUTROPHILS NFR BLD AUTO: 71.9 % — SIGNIFICANT CHANGE UP (ref 37–73)
PHOSPHATE SERPL-MCNC: 3.3 MG/DL — SIGNIFICANT CHANGE UP (ref 2.4–4.7)
PLATELET # BLD AUTO: 344 K/UL — SIGNIFICANT CHANGE UP (ref 150–400)
POTASSIUM SERPL-MCNC: 3.7 MMOL/L — SIGNIFICANT CHANGE UP (ref 3.5–5.3)
POTASSIUM SERPL-SCNC: 3.7 MMOL/L — SIGNIFICANT CHANGE UP (ref 3.5–5.3)
RBC # BLD: 4.04 M/UL — LOW (ref 4.4–5.2)
RBC # FLD: 13.4 % — SIGNIFICANT CHANGE UP (ref 11–15.6)
SODIUM SERPL-SCNC: 135 MMOL/L — SIGNIFICANT CHANGE UP (ref 135–145)
WBC # BLD: 10.4 K/UL — SIGNIFICANT CHANGE UP (ref 4.8–10.8)
WBC # FLD AUTO: 10.4 K/UL — SIGNIFICANT CHANGE UP (ref 4.8–10.8)

## 2019-03-30 RX ORDER — POTASSIUM CHLORIDE 20 MEQ
40 PACKET (EA) ORAL ONCE
Qty: 0 | Refills: 0 | Status: COMPLETED | OUTPATIENT
Start: 2019-03-30 | End: 2019-03-30

## 2019-03-30 RX ORDER — OXYCODONE HYDROCHLORIDE 5 MG/1
10 TABLET ORAL EVERY 6 HOURS
Qty: 0 | Refills: 0 | Status: DISCONTINUED | OUTPATIENT
Start: 2019-03-30 | End: 2019-03-30

## 2019-03-30 RX ADMIN — SODIUM CHLORIDE 125 MILLILITER(S): 9 INJECTION INTRAMUSCULAR; INTRAVENOUS; SUBCUTANEOUS at 12:10

## 2019-03-30 RX ADMIN — Medication 650 MILLIGRAM(S): at 19:30

## 2019-03-30 RX ADMIN — Medication 650 MILLIGRAM(S): at 12:10

## 2019-03-30 RX ADMIN — Medication 40 MILLIEQUIVALENT(S): at 16:45

## 2019-03-30 RX ADMIN — Medication 2: at 12:14

## 2019-03-30 RX ADMIN — OXYCODONE HYDROCHLORIDE 10 MILLIGRAM(S): 5 TABLET ORAL at 08:41

## 2019-03-30 RX ADMIN — Medication 650 MILLIGRAM(S): at 12:40

## 2019-03-30 RX ADMIN — Medication 3 MILLIGRAM(S): at 22:00

## 2019-03-30 RX ADMIN — OXYCODONE HYDROCHLORIDE 10 MILLIGRAM(S): 5 TABLET ORAL at 07:43

## 2019-03-30 RX ADMIN — SODIUM CHLORIDE 125 MILLILITER(S): 9 INJECTION INTRAMUSCULAR; INTRAVENOUS; SUBCUTANEOUS at 01:56

## 2019-03-30 RX ADMIN — Medication 650 MILLIGRAM(S): at 19:00

## 2019-03-30 RX ADMIN — ENOXAPARIN SODIUM 40 MILLIGRAM(S): 100 INJECTION SUBCUTANEOUS at 12:09

## 2019-03-30 RX ADMIN — Medication 650 MILLIGRAM(S): at 00:14

## 2019-03-30 RX ADMIN — Medication 650 MILLIGRAM(S): at 01:14

## 2019-03-30 NOTE — PROGRESS NOTE ADULT - ASSESSMENT
2 y/o F w/ PMH of DM, HTN, HLD and multiple abdominal surgeries including abdominoplasty w/ post-op bleeding requiring ex-lap, hysterectomy, and ventral hernia repair with mesh presenting with abdominal bloating, nausea, and vomiting with evidence of SBO. on POD2, ex-lap with SBR and DANIEL.   - CLD until BM, and then reg diet  - encourage oob and ambulation  - pain control PRN  - encourage IS use  - JOJO  - dvt ppx  - dispo: continue inpt care at this time F w/ PMH of DM, HTN, HLD and multiple abdominal surgeries including abdominoplasty w/ post-op bleeding requiring ex-lap, hysterectomy, and ventral hernia repair with mesh presenting with abdominal bloating, nausea, and vomiting with evidence of SBO. on POD2, ex-lap with SBR and DANIEL.   - CLD until BM, and then reg diet  - encourage oob and ambulation  - pain control PRN  - encourage IS use  - JOJO  - dvt ppx  - dispo: continue inpt care at this time

## 2019-03-30 NOTE — PROGRESS NOTE ADULT - SUBJECTIVE AND OBJECTIVE BOX
HPI/OVERNIGHT EVENTS:  No acute events overnight. Patient's NGT was removed. Patient was started on CLD. tolerating, no nausea. +flatus, -BM. Tmax of 101.5 yesterday evening which resolved with ofirmev.     MEDICATIONS  (STANDING):  benzocaine 15 mG/menthol 3.6 mG Lozenge 1 Lozenge Oral every 6 hours  dextrose 5%. 1000 milliLiter(s) (50 mL/Hr) IV Continuous <Continuous>  enoxaparin Injectable 40 milliGRAM(s) SubCutaneous daily  insulin lispro (HumaLOG) corrective regimen sliding scale   SubCutaneous every 6 hours  melatonin 3 milliGRAM(s) Oral at bedtime  sodium chloride 0.9%. 1000 milliLiter(s) (125 mL/Hr) IV Continuous <Continuous>    MEDICATIONS  (PRN):  acetaminophen   Tablet .. 650 milliGRAM(s) Oral every 6 hours PRN Mild Pain (1 - 3)  famotidine  IVPB 20 milliGRAM(s) IV Intermittent two times a day PRN heartburn  HYDROmorphone  Injectable 0.5 milliGRAM(s) IV Push every 6 hours PRN breakthrough pain  naloxone Injectable 0.1 milliGRAM(s) IV Push every 3 minutes PRN For ANY of the following changes in patient status:  A. RR LESS THAN 10 breaths per minute, B. Oxygen saturation LESS THAN 90%, C. Sedation score of 6  ondansetron Injectable 4 milliGRAM(s) IV Push every 6 hours PRN Nausea  oxyCODONE    IR 5 milliGRAM(s) Oral every 6 hours PRN Moderate Pain (4 - 6)  oxyCODONE    IR 10 milliGRAM(s) Oral every 6 hours PRN Severe Pain (7 - 10)      Vital Signs Last 24 Hrs  T(C): 36.5 (30 Mar 2019 02:02), Max: 38.6 (29 Mar 2019 16:26)  T(F): 97.7 (30 Mar 2019 02:02), Max: 101.5 (29 Mar 2019 16:26)  HR: 87 (29 Mar 2019 23:56) (82 - 95)  BP: 111/77 (29 Mar 2019 23:56) (93/56 - 118/76)  BP(mean): --  RR: 18 (29 Mar 2019 23:56) (18 - 18)  SpO2: 92% (29 Mar 2019 16:26) (92% - 97%)    gen: wdwn, nad, a&ox3  cv: rrr  resp: nonlabored breathing  gi: soft, mildly distended, nontender to palpation  msk: no c/c/e      I&O's Detail    29 Mar 2019 07:01  -  30 Mar 2019 07:00  --------------------------------------------------------  IN:    sodium chloride 0.9%.: 3000 mL    Solution: 416.5 mL    Solution: 50 mL  Total IN: 3466.5 mL    OUT:  Total OUT: 0 mL    Total NET: 3466.5 mL          LABS:                        11.8   11.1  )-----------( 324      ( 29 Mar 2019 10:22 )             36.1     03-29    135  |  98  |  5.0<L>  ----------------------------<  88  4.6   |  23.0  |  0.49<L>    Ca    8.6      29 Mar 2019 10:22  Phos  1.8     03-29  Mg     1.6     03-29

## 2019-03-31 LAB
ANION GAP SERPL CALC-SCNC: 12 MMOL/L — SIGNIFICANT CHANGE UP (ref 5–17)
BASOPHILS # BLD AUTO: 0 K/UL — SIGNIFICANT CHANGE UP (ref 0–0.2)
BASOPHILS NFR BLD AUTO: 0.2 % — SIGNIFICANT CHANGE UP (ref 0–2)
BUN SERPL-MCNC: <3 MG/DL — LOW (ref 8–20)
CALCIUM SERPL-MCNC: 8.1 MG/DL — LOW (ref 8.6–10.2)
CHLORIDE SERPL-SCNC: 104 MMOL/L — SIGNIFICANT CHANGE UP (ref 98–107)
CO2 SERPL-SCNC: 21 MMOL/L — LOW (ref 22–29)
CREAT SERPL-MCNC: 0.45 MG/DL — LOW (ref 0.5–1.3)
EOSINOPHIL # BLD AUTO: 0.2 K/UL — SIGNIFICANT CHANGE UP (ref 0–0.5)
EOSINOPHIL NFR BLD AUTO: 2.8 % — SIGNIFICANT CHANGE UP (ref 0–6)
GLUCOSE BLDC GLUCOMTR-MCNC: 110 MG/DL — HIGH (ref 70–99)
GLUCOSE BLDC GLUCOMTR-MCNC: 118 MG/DL — HIGH (ref 70–99)
GLUCOSE BLDC GLUCOMTR-MCNC: 128 MG/DL — HIGH (ref 70–99)
GLUCOSE BLDC GLUCOMTR-MCNC: 89 MG/DL — SIGNIFICANT CHANGE UP (ref 70–99)
GLUCOSE SERPL-MCNC: 97 MG/DL — SIGNIFICANT CHANGE UP (ref 70–115)
HCT VFR BLD CALC: 30.7 % — LOW (ref 37–47)
HGB BLD-MCNC: 10.1 G/DL — LOW (ref 12–16)
LYMPHOCYTES # BLD AUTO: 1.2 K/UL — SIGNIFICANT CHANGE UP (ref 1–4.8)
LYMPHOCYTES # BLD AUTO: 20.2 % — SIGNIFICANT CHANGE UP (ref 20–55)
MAGNESIUM SERPL-MCNC: 1.6 MG/DL — SIGNIFICANT CHANGE UP (ref 1.6–2.6)
MCHC RBC-ENTMCNC: 28.7 PG — SIGNIFICANT CHANGE UP (ref 27–31)
MCHC RBC-ENTMCNC: 32.9 G/DL — SIGNIFICANT CHANGE UP (ref 32–36)
MCV RBC AUTO: 87.2 FL — SIGNIFICANT CHANGE UP (ref 81–99)
MONOCYTES # BLD AUTO: 0.7 K/UL — SIGNIFICANT CHANGE UP (ref 0–0.8)
MONOCYTES NFR BLD AUTO: 10.9 % — HIGH (ref 3–10)
NEUTROPHILS # BLD AUTO: 4 K/UL — SIGNIFICANT CHANGE UP (ref 1.8–8)
NEUTROPHILS NFR BLD AUTO: 65.6 % — SIGNIFICANT CHANGE UP (ref 37–73)
PHOSPHATE SERPL-MCNC: 3.1 MG/DL — SIGNIFICANT CHANGE UP (ref 2.4–4.7)
PLATELET # BLD AUTO: 347 K/UL — SIGNIFICANT CHANGE UP (ref 150–400)
POTASSIUM SERPL-MCNC: 3.9 MMOL/L — SIGNIFICANT CHANGE UP (ref 3.5–5.3)
POTASSIUM SERPL-SCNC: 3.9 MMOL/L — SIGNIFICANT CHANGE UP (ref 3.5–5.3)
RBC # BLD: 3.52 M/UL — LOW (ref 4.4–5.2)
RBC # FLD: 13.6 % — SIGNIFICANT CHANGE UP (ref 11–15.6)
SODIUM SERPL-SCNC: 137 MMOL/L — SIGNIFICANT CHANGE UP (ref 135–145)
WBC # BLD: 6 K/UL — SIGNIFICANT CHANGE UP (ref 4.8–10.8)
WBC # FLD AUTO: 6 K/UL — SIGNIFICANT CHANGE UP (ref 4.8–10.8)

## 2019-03-31 RX ORDER — MAGNESIUM OXIDE 400 MG ORAL TABLET 241.3 MG
400 TABLET ORAL ONCE
Qty: 0 | Refills: 0 | Status: COMPLETED | OUTPATIENT
Start: 2019-03-31 | End: 2019-03-31

## 2019-03-31 RX ADMIN — SODIUM CHLORIDE 125 MILLILITER(S): 9 INJECTION INTRAMUSCULAR; INTRAVENOUS; SUBCUTANEOUS at 16:22

## 2019-03-31 RX ADMIN — Medication 650 MILLIGRAM(S): at 02:02

## 2019-03-31 RX ADMIN — Medication 650 MILLIGRAM(S): at 22:44

## 2019-03-31 RX ADMIN — Medication 650 MILLIGRAM(S): at 12:11

## 2019-03-31 RX ADMIN — FAMOTIDINE 100 MILLIGRAM(S): 10 INJECTION INTRAVENOUS at 16:58

## 2019-03-31 RX ADMIN — ENOXAPARIN SODIUM 40 MILLIGRAM(S): 100 INJECTION SUBCUTANEOUS at 11:44

## 2019-03-31 RX ADMIN — MAGNESIUM OXIDE 400 MG ORAL TABLET 400 MILLIGRAM(S): 241.3 TABLET ORAL at 16:26

## 2019-03-31 RX ADMIN — Medication 650 MILLIGRAM(S): at 11:43

## 2019-03-31 RX ADMIN — SODIUM CHLORIDE 125 MILLILITER(S): 9 INJECTION INTRAMUSCULAR; INTRAVENOUS; SUBCUTANEOUS at 05:48

## 2019-03-31 RX ADMIN — Medication 650 MILLIGRAM(S): at 21:44

## 2019-03-31 RX ADMIN — Medication 3 MILLIGRAM(S): at 21:44

## 2019-03-31 NOTE — PROGRESS NOTE ADULT - ASSESSMENT
64 yo F w/ PMH of DM, HTN, HLD and multiple abdominal surgeries including abdominoplasty w/ post-op bleeding requiring ex-lap, hysterectomy, and ventral hernia repair with mesh presenting with abdominal bloating, nausea, and vomiting with evidence of SBO. on POD2, ex-lap with SBR and DANIEL.     - CLD until BM, and then reg diet  - encourage oob and ambulation  - pain control PRN  - encourage IS use  - JOJO  - dvt ppx  - dispo: continue inpt care at this time

## 2019-03-31 NOTE — PROGRESS NOTE ADULT - ATTENDING COMMENTS
+ flatus.  Feeling better.  No n/V.  abd softly distended.      NG clamp trial.  OR on hold.  Ambulating.  reassess.  Possible remove NG this PM.
No flatus yet but no abd pain, bloating, N/V.      Abd soft.  Sig decreased NG output.     Continue supportive measures for post op ileus.  Encouraged to ambulate.  Replete magnesium for hypomagnesemia.
No return of BF yet.  pt does report feeling significantly better now post op.      maintain NG tube, IV fluids.  Awaiting return of bowel function.
avss  abd soft nt obese no tympany appreciated  ngt over 1L / day  labs reviewed  plan continue with current care  d/w patient if doesn't resolve in next 48-72 would need surgery. all questions answered.
avss  ngt with 2L /day but pt taking ice chips, output is clear.  no flatus or bm  abd soft obese, distended   labs reviewed  plan  I d/w the patient and her  , if no resolution , planning for surgery tomorrow , ex lap, james, possible bowel resection, all questions answered.
avss  ngt with high outputs but pt is taking ice chips  no bowel function, started gastrograffin challenge yesterday   abd soft , nt obese, no tympany  labs reviewed  plan  cont with current care,   f/u serial kub's  if fails to resolve ; will need surgery , d/w pt and all questions were answered.
avss, 1x vomiting yesterday, stopped having flatus in the afternoon yesterday , no bm  abd soft mild llq ttp, no rebound,  labs reviewed   kub reviewed  planning for surgery tomorrow  for failing to resolve sbo  replace ngt
s/p ex lap james and sbr for sbo  post op plan  ngt   may have gum, candy, and ice chips  ambulate  void check  pain control with pca
s/p ex lap with sbr for sbo  avss, mult episodes of BM overnight  tolerating clears  abd soft surgical site healing well , no tympany  labs reviewed  plan  regular diet  HL ivf  possible d/c on monday.
The patient was seen and examined  No new problems  Taking PO liquids  Abdomen is softly distended, midline wound clean staples in place  Allow patient to shower  Mobilize OOB  DVT prophylaxis
minimal improvement.  Lactate acidosis and leukocytosis resolved.  NG malfunctioned this AM leading to vomiting.  Sump cleared and suction improved.  Would consider Gastrogaffin challenge if output slows enough to safely clamp g tube for long enough to administer gastrografin.  Cont close observation with non operative management.

## 2019-03-31 NOTE — PROGRESS NOTE ADULT - SUBJECTIVE AND OBJECTIVE BOX
INTERVAL HPI/OVERNIGHT EVENTS:    SUBJECTIVE:  No acute events overngight. Patient is no longer febrile; passing flatus, nontachy overnight  Abd exam improving. Tolerating CLD. no N/V  Narcotics were dced due to better pain and improve ileus  OK to shower, order placed and nurse informed    MEDICATIONS  (STANDING):  benzocaine 15 mG/menthol 3.6 mG Lozenge 1 Lozenge Oral every 6 hours  dextrose 5%. 1000 milliLiter(s) (50 mL/Hr) IV Continuous <Continuous>  enoxaparin Injectable 40 milliGRAM(s) SubCutaneous daily  insulin lispro (HumaLOG) corrective regimen sliding scale   SubCutaneous every 6 hours  melatonin 3 milliGRAM(s) Oral at bedtime  sodium chloride 0.9%. 1000 milliLiter(s) (125 mL/Hr) IV Continuous <Continuous>    MEDICATIONS  (PRN):  acetaminophen   Tablet .. 650 milliGRAM(s) Oral every 6 hours PRN Mild Pain (1 - 3)  famotidine  IVPB 20 milliGRAM(s) IV Intermittent two times a day PRN heartburn  naloxone Injectable 0.1 milliGRAM(s) IV Push every 3 minutes PRN For ANY of the following changes in patient status:  A. RR LESS THAN 10 breaths per minute, B. Oxygen saturation LESS THAN 90%, C. Sedation score of 6  ondansetron Injectable 4 milliGRAM(s) IV Push every 6 hours PRN Nausea      Vital Signs Last 24 Hrs  T(C): 36.9 (31 Mar 2019 00:00), Max: 37.2 (30 Mar 2019 08:39)  T(F): 98.4 (31 Mar 2019 00:00), Max: 98.9 (30 Mar 2019 08:39)  HR: 74 (31 Mar 2019 00:00) (74 - 84)  BP: 127/81 (31 Mar 2019 00:00) (115/78 - 137/91)  BP(mean): --  RR: 18 (31 Mar 2019 00:00) (18 - 18)  SpO2: 96% (31 Mar 2019 00:00) (95% - 96%)    PE  gen: wdwn, nad, a&ox3  cv: rrr  resp: nonlabored breathing  gi: soft, mildly distended, nontender to palpation  msk: no c/c/e      I&O's Detail    29 Mar 2019 07:01  -  30 Mar 2019 07:00  --------------------------------------------------------  IN:    sodium chloride 0.9%.: 3000 mL    Solution: 416.5 mL    Solution: 50 mL  Total IN: 3466.5 mL    OUT:  Total OUT: 0 mL    Total NET: 3466.5 mL      30 Mar 2019 07:01  -  31 Mar 2019 01:56  --------------------------------------------------------  IN:    sodium chloride 0.9%.: 1500 mL  Total IN: 1500 mL    OUT:  Total OUT: 0 mL    Total NET: 1500 mL          LABS:                        11.9   10.4  )-----------( 344      ( 30 Mar 2019 08:44 )             35.5     03-30    135  |  98  |  <3.0<L>  ----------------------------<  122<H>  3.7   |  24.0  |  0.49<L>    Ca    8.7      30 Mar 2019 08:44  Phos  3.3     03-30  Mg     1.8     03-30            RADIOLOGY & ADDITIONAL STUDIES:

## 2019-04-01 ENCOUNTER — TRANSCRIPTION ENCOUNTER (OUTPATIENT)
Age: 64
End: 2019-04-01

## 2019-04-01 VITALS
TEMPERATURE: 98 F | OXYGEN SATURATION: 98 % | DIASTOLIC BLOOD PRESSURE: 78 MMHG | SYSTOLIC BLOOD PRESSURE: 131 MMHG | RESPIRATION RATE: 20 BRPM | HEART RATE: 83 BPM

## 2019-04-01 LAB
ANION GAP SERPL CALC-SCNC: 11 MMOL/L — SIGNIFICANT CHANGE UP (ref 5–17)
BASOPHILS # BLD AUTO: 0 K/UL — SIGNIFICANT CHANGE UP (ref 0–0.2)
BASOPHILS NFR BLD AUTO: 0.1 % — SIGNIFICANT CHANGE UP (ref 0–2)
BUN SERPL-MCNC: 6 MG/DL — LOW (ref 8–20)
CALCIUM SERPL-MCNC: 8 MG/DL — LOW (ref 8.6–10.2)
CHLORIDE SERPL-SCNC: 107 MMOL/L — SIGNIFICANT CHANGE UP (ref 98–107)
CO2 SERPL-SCNC: 21 MMOL/L — LOW (ref 22–29)
CREAT SERPL-MCNC: 0.49 MG/DL — LOW (ref 0.5–1.3)
EOSINOPHIL # BLD AUTO: 0.2 K/UL — SIGNIFICANT CHANGE UP (ref 0–0.5)
EOSINOPHIL NFR BLD AUTO: 2.8 % — SIGNIFICANT CHANGE UP (ref 0–6)
GLUCOSE BLDC GLUCOMTR-MCNC: 149 MG/DL — HIGH (ref 70–99)
GLUCOSE BLDC GLUCOMTR-MCNC: 151 MG/DL — HIGH (ref 70–99)
GLUCOSE SERPL-MCNC: 131 MG/DL — HIGH (ref 70–115)
HCT VFR BLD CALC: 29.5 % — LOW (ref 37–47)
HGB BLD-MCNC: 9.9 G/DL — LOW (ref 12–16)
LYMPHOCYTES # BLD AUTO: 1.6 K/UL — SIGNIFICANT CHANGE UP (ref 1–4.8)
LYMPHOCYTES # BLD AUTO: 20.7 % — SIGNIFICANT CHANGE UP (ref 20–55)
MAGNESIUM SERPL-MCNC: 1.4 MG/DL — LOW (ref 1.6–2.6)
MCHC RBC-ENTMCNC: 29.4 PG — SIGNIFICANT CHANGE UP (ref 27–31)
MCHC RBC-ENTMCNC: 33.6 G/DL — SIGNIFICANT CHANGE UP (ref 32–36)
MCV RBC AUTO: 87.5 FL — SIGNIFICANT CHANGE UP (ref 81–99)
MONOCYTES # BLD AUTO: 0.8 K/UL — SIGNIFICANT CHANGE UP (ref 0–0.8)
MONOCYTES NFR BLD AUTO: 10.7 % — HIGH (ref 3–10)
NEUTROPHILS # BLD AUTO: 5.1 K/UL — SIGNIFICANT CHANGE UP (ref 1.8–8)
NEUTROPHILS NFR BLD AUTO: 65.6 % — SIGNIFICANT CHANGE UP (ref 37–73)
PHOSPHATE SERPL-MCNC: 2.4 MG/DL — SIGNIFICANT CHANGE UP (ref 2.4–4.7)
PLATELET # BLD AUTO: 372 K/UL — SIGNIFICANT CHANGE UP (ref 150–400)
POTASSIUM SERPL-MCNC: 3.4 MMOL/L — LOW (ref 3.5–5.3)
POTASSIUM SERPL-SCNC: 3.4 MMOL/L — LOW (ref 3.5–5.3)
RBC # BLD: 3.37 M/UL — LOW (ref 4.4–5.2)
RBC # FLD: 13.7 % — SIGNIFICANT CHANGE UP (ref 11–15.6)
SODIUM SERPL-SCNC: 139 MMOL/L — SIGNIFICANT CHANGE UP (ref 135–145)
SURGICAL PATHOLOGY STUDY: SIGNIFICANT CHANGE UP
WBC # BLD: 7.8 K/UL — SIGNIFICANT CHANGE UP (ref 4.8–10.8)
WBC # FLD AUTO: 7.8 K/UL — SIGNIFICANT CHANGE UP (ref 4.8–10.8)

## 2019-04-01 PROCEDURE — 96361 HYDRATE IV INFUSION ADD-ON: CPT

## 2019-04-01 PROCEDURE — 71045 X-RAY EXAM CHEST 1 VIEW: CPT

## 2019-04-01 PROCEDURE — 96375 TX/PRO/DX INJ NEW DRUG ADDON: CPT

## 2019-04-01 PROCEDURE — 82310 ASSAY OF CALCIUM: CPT

## 2019-04-01 PROCEDURE — 86803 HEPATITIS C AB TEST: CPT

## 2019-04-01 PROCEDURE — 85730 THROMBOPLASTIN TIME PARTIAL: CPT

## 2019-04-01 PROCEDURE — 93005 ELECTROCARDIOGRAM TRACING: CPT

## 2019-04-01 PROCEDURE — 86900 BLOOD TYPING SEROLOGIC ABO: CPT

## 2019-04-01 PROCEDURE — 99285 EMERGENCY DEPT VISIT HI MDM: CPT | Mod: 25

## 2019-04-01 PROCEDURE — 80053 COMPREHEN METABOLIC PANEL: CPT

## 2019-04-01 PROCEDURE — 81001 URINALYSIS AUTO W/SCOPE: CPT

## 2019-04-01 PROCEDURE — 83735 ASSAY OF MAGNESIUM: CPT

## 2019-04-01 PROCEDURE — 83970 ASSAY OF PARATHORMONE: CPT

## 2019-04-01 PROCEDURE — 88307 TISSUE EXAM BY PATHOLOGIST: CPT

## 2019-04-01 PROCEDURE — 82009 KETONE BODYS QUAL: CPT

## 2019-04-01 PROCEDURE — 83605 ASSAY OF LACTIC ACID: CPT

## 2019-04-01 PROCEDURE — 86923 COMPATIBILITY TEST ELECTRIC: CPT

## 2019-04-01 PROCEDURE — 83036 HEMOGLOBIN GLYCOSYLATED A1C: CPT

## 2019-04-01 PROCEDURE — 82962 GLUCOSE BLOOD TEST: CPT

## 2019-04-01 PROCEDURE — 85610 PROTHROMBIN TIME: CPT

## 2019-04-01 PROCEDURE — 86850 RBC ANTIBODY SCREEN: CPT

## 2019-04-01 PROCEDURE — 85027 COMPLETE CBC AUTOMATED: CPT

## 2019-04-01 PROCEDURE — 36415 COLL VENOUS BLD VENIPUNCTURE: CPT

## 2019-04-01 PROCEDURE — 74177 CT ABD & PELVIS W/CONTRAST: CPT

## 2019-04-01 PROCEDURE — 86901 BLOOD TYPING SEROLOGIC RH(D): CPT

## 2019-04-01 PROCEDURE — 84100 ASSAY OF PHOSPHORUS: CPT

## 2019-04-01 PROCEDURE — 74018 RADEX ABDOMEN 1 VIEW: CPT

## 2019-04-01 PROCEDURE — 74019 RADEX ABDOMEN 2 VIEWS: CPT

## 2019-04-01 PROCEDURE — 96374 THER/PROPH/DIAG INJ IV PUSH: CPT | Mod: XU

## 2019-04-01 PROCEDURE — 80048 BASIC METABOLIC PNL TOTAL CA: CPT

## 2019-04-01 PROCEDURE — 96376 TX/PRO/DX INJ SAME DRUG ADON: CPT

## 2019-04-01 PROCEDURE — 83690 ASSAY OF LIPASE: CPT

## 2019-04-01 RX ORDER — MAGNESIUM SULFATE 500 MG/ML
2 VIAL (ML) INJECTION
Qty: 0 | Refills: 0 | Status: COMPLETED | OUTPATIENT
Start: 2019-04-01 | End: 2019-04-01

## 2019-04-01 RX ORDER — ACETAMINOPHEN 500 MG
2 TABLET ORAL
Qty: 0 | Refills: 0 | DISCHARGE
Start: 2019-04-01

## 2019-04-01 RX ORDER — POTASSIUM CHLORIDE 20 MEQ
40 PACKET (EA) ORAL ONCE
Qty: 0 | Refills: 0 | Status: COMPLETED | OUTPATIENT
Start: 2019-04-01 | End: 2019-04-01

## 2019-04-01 RX ORDER — SODIUM CHLORIDE 9 MG/ML
1000 INJECTION INTRAMUSCULAR; INTRAVENOUS; SUBCUTANEOUS
Qty: 0 | Refills: 0 | Status: DISCONTINUED | OUTPATIENT
Start: 2019-04-01 | End: 2019-04-01

## 2019-04-01 RX ADMIN — Medication 40 MILLIEQUIVALENT(S): at 09:04

## 2019-04-01 RX ADMIN — Medication 50 GRAM(S): at 10:37

## 2019-04-01 RX ADMIN — ENOXAPARIN SODIUM 40 MILLIGRAM(S): 100 INJECTION SUBCUTANEOUS at 11:37

## 2019-04-01 RX ADMIN — BENZOCAINE AND MENTHOL 1 LOZENGE: 5; 1 LIQUID ORAL at 11:37

## 2019-04-01 RX ADMIN — Medication 50 GRAM(S): at 09:04

## 2019-04-01 NOTE — DISCHARGE NOTE NURSING/CASE MANAGEMENT/SOCIAL WORK - NSDCDPATPORTLINK_GEN_ALL_CORE
You can access the [facility] Patient Portal, offered by [facility], by registering with the following website: [email or web address]

## 2019-04-01 NOTE — DISCHARGE NOTE PROVIDER - NSDCACTIVITY_GEN_ALL_CORE
Sex allowed/Showering allowed/Driving allowed/Stairs allowed/Return to Work/School allowed/Walking - Indoors allowed/No heavy lifting/straining

## 2019-04-01 NOTE — DISCHARGE NOTE PROVIDER - NSDCFUADDINST_GEN_ALL_CORE_FT
BATHING: Please do not submerge wound underwater. You may shower and/or sponge bathe.   ACTIVITY: No heavy lifting or straining. Otherwise, you may return to your usual level of physical activity. If you are taking narcotic pain medication (such as Percocet) DO NOT drive a car, operate machinery or make important decisions.  DIET: Return to your usual diet.  NOTIFY YOUR SURGEON IF: You have any bleeding that does not stop, any pus draining from your wound(s), any fever (over 100.4 F) or chills, persistent nausea/vomiting, persistent diarrhea, or if your pain is not controlled on your discharge pain medications.  FOLLOW-UP: Please follow up with your primary care physician and Acute Care Surgery clinic [phone] in 10-14 days regarding your hospitalization. Call for appointment upon discharge.

## 2019-04-01 NOTE — PROGRESS NOTE ADULT - PROVIDER SPECIALTY LIST ADULT
Anesthesia
Surgery

## 2019-04-01 NOTE — CHART NOTE - NSCHARTNOTEFT_GEN_A_CORE
Source: Patient [x]  Family [ ]   other [ ]    Current Diet:   Diet, Regular (03-31-19 @ 09:26)    Patient reports [ ] nausea  [ ] vomiting [ ] diarrhea [ ] constipation  [ ]chewing problems [ ] swallowing issues  [ ] other: denies    PO intake:  < 50% [ ]   50-75%  [x]   %  [ ]  other :    Source for PO intake [x] Patient [ ] family [ ] chart [ ] staff [ ] other    Current Weight:   (3/18)    210.1lbs    % Weight Change : No recent weight documented     Pertinent Medications: MEDICATIONS  (STANDING):  benzocaine 15 mG/menthol 3.6 mG Lozenge 1 Lozenge Oral every 6 hours  dextrose 5%. 1000 milliLiter(s) (50 mL/Hr) IV Continuous <Continuous>  enoxaparin Injectable 40 milliGRAM(s) SubCutaneous daily  insulin lispro (HumaLOG) corrective regimen sliding scale   SubCutaneous every 6 hours  magnesium sulfate  IVPB 2 Gram(s) IV Intermittent every 1 hour  melatonin 3 milliGRAM(s) Oral at bedtime    MEDICATIONS  (PRN):  acetaminophen   Tablet .. 650 milliGRAM(s) Oral every 6 hours PRN Mild Pain (1 - 3)  famotidine  IVPB 20 milliGRAM(s) IV Intermittent two times a day PRN heartburn  naloxone Injectable 0.1 milliGRAM(s) IV Push every 3 minutes PRN For ANY of the following changes in patient status:  A. RR LESS THAN 10 breaths per minute, B. Oxygen saturation LESS THAN 90%, C. Sedation score of 6  ondansetron Injectable 4 milliGRAM(s) IV Push every 6 hours PRN Nausea    Pertinent Labs: CBC Full  -  ( 01 Apr 2019 07:54 )  WBC Count : 7.8 K/uL  RBC Count : 3.37 M/uL  Hemoglobin : 9.9 g/dL  Hematocrit : 29.5 %  Platelet Count - Automated : 372 K/uL  Mean Cell Volume : 87.5 fl  Mean Cell Hemoglobin : 29.4 pg  Mean Cell Hemoglobin Concentration : 33.6 g/dL  Auto Neutrophil # : 5.1 K/uL  Auto Lymphocyte # : 1.6 K/uL  Auto Monocyte # : 0.8 K/uL  Auto Eosinophil # : 0.2 K/uL  Auto Basophil # : 0.0 K/uL  Auto Neutrophil % : 65.6 %  Auto Lymphocyte % : 20.7 %  Auto Monocyte % : 10.7 %  Auto Eosinophil % : 2.8 %  Auto Basophil % : 0.1 %  04-01 Na139 mmol/L Glu 131 mg/dL<H> K+ 3.4 mmol/L<L> Cr  0.49 mg/dL<L> BUN 6.0 mg/dL<L> Phos 2.4 mg/dL Alb n/a   PAB n/a       Skin: Surgical incision midline abdomen    Nutrition focused physical exam conducted - found signs of malnutrition []absent [x]present    Subcutaneous fat loss: [ ] Orbital fat pads region, [ ]Buccal fat region, [ ]Triceps region,  [ ]Ribs region    Muscle wasting: [x]Temples region, [ ]Clavicle region, [ ]Shoulder region, [ ]Scapula region, [ ]Interosseous region,  [ ]thigh region, [ ]Calf region    Estimated Needs:   [X] no change since previous assessment  [ ] recalculated:     Current Nutrition Diagnosis: Pt continues at nutrition risk secondary to mild acute malnutrition related to medical intervention for SBO requiring NGT for suction, now s/p ex-lap with small bowel resection and lysis of adhesion as evidenced by NPO x 9 days, mild temple wasting noted.   Pt was started on regular diet 3/31, tolerating well. Aware plan for d/c. Pt was provided verbal and written nutrition education for low fiber (increase gradually), low fat nutrition therapy, adequate fluid intake. Pt was receptive to the information, all questions answered to Pt's satisfaction. RD to remain available.     Recommendations:   Low fiber/residue/fat diet     Monitoring and Evaluation:   [X] PO intake [X] Tolerance to diet prescription [X] Weights  [X] Follow up per protocol [X] Labs:

## 2019-04-01 NOTE — DISCHARGE NOTE PROVIDER - HOSPITAL COURSE
HPI:    62 y/o F w/ PMH of DM, HTN, HLD and multiple abdominal surgeries including abdomioplasty w/ post-op bleeding requiring ex-lap, hysterectomy, and ventral hernia repair with mesh presenting with abdominal bloating, nausea, and vomiting. Patient was in her usual state of health when she started to have acute onset nausea, vomiting, and bloating this morning. Patient reports that she had a BM and a small amount of gas early this AM. She reports +nausea at this time. Subjective fevers and chills at home. Denies prior similar episodes in the past. Denies CP/SOB. Denies changes in diet or sick contacts at home. (19 Mar 2019 02:27)        Hospital Course: HPI:    62 y/o F w/ PMH of DM, HTN, HLD and multiple abdominal surgeries including abdomioplasty w/ post-op bleeding requiring ex-lap, hysterectomy, and ventral hernia repair with mesh presenting with abdominal bloating, nausea, and vomiting. Patient was in her usual state of health when she started to have acute onset nausea, vomiting, and bloating this morning. Patient reports that she had a BM and a small amount of gas early this AM. She reports +nausea at this time. Subjective fevers and chills at home. Denies prior similar episodes in the past. Denies CP/SOB. Denies changes in diet or sick contacts at home. (19 Mar 2019 02:27)        Hospital Course:    Patient was admitted to the Acute Care Surgery team, med surg floor. NGT was placed for decompression as patient clinically with small bowel obstruction. Conservative management undertaken and patient seemingly was doing well with some return of bowel function, however abdominal pain returned and she eventually required operative intervention. Ex lap, Lysis of adhesions performe.d Post operatively patient did well. Pain was appropriately managed. She had return of bowel function and her NGT was removed. She had her diet slowly advanced and was ambulating. pain was controlled and at this time patient is stable for discharge to home with follow up in clinic x2 weeks for staple removal and check up. Please follow up with your primary care physician regarding your hospitalization

## 2019-04-01 NOTE — PROGRESS NOTE ADULT - SUBJECTIVE AND OBJECTIVE BOX
INTERVAL HPI/OVERNIGHT EVENTS: Advanced to reg diet    SUBJECTIVE: Feeling well this AM. No fevers/chills, no N/V.       MEDICATIONS  (STANDING):  benzocaine 15 mG/menthol 3.6 mG Lozenge 1 Lozenge Oral every 6 hours  dextrose 5%. 1000 milliLiter(s) (50 mL/Hr) IV Continuous <Continuous>  enoxaparin Injectable 40 milliGRAM(s) SubCutaneous daily  insulin lispro (HumaLOG) corrective regimen sliding scale   SubCutaneous every 6 hours  melatonin 3 milliGRAM(s) Oral at bedtime  sodium chloride 0.9%. 1000 milliLiter(s) (50 mL/Hr) IV Continuous <Continuous>    MEDICATIONS  (PRN):  acetaminophen   Tablet .. 650 milliGRAM(s) Oral every 6 hours PRN Mild Pain (1 - 3)  famotidine  IVPB 20 milliGRAM(s) IV Intermittent two times a day PRN heartburn  naloxone Injectable 0.1 milliGRAM(s) IV Push every 3 minutes PRN For ANY of the following changes in patient status:  A. RR LESS THAN 10 breaths per minute, B. Oxygen saturation LESS THAN 90%, C. Sedation score of 6  ondansetron Injectable 4 milliGRAM(s) IV Push every 6 hours PRN Nausea      Vital Signs Last 24 Hrs  T(C): 36.9 (31 Mar 2019 23:48), Max: 37.4 (31 Mar 2019 15:40)  T(F): 98.5 (31 Mar 2019 23:48), Max: 99.4 (31 Mar 2019 15:40)  HR: 71 (31 Mar 2019 23:48) (67 - 76)  BP: 110/63 (31 Mar 2019 23:48) (110/63 - 122/80)  BP(mean): --  RR: 18 (31 Mar 2019 23:48) (18 - 18)  SpO2: 99% (31 Mar 2019 23:48) (96% - 99%)    PE  gen: nad, a&ox3  cv: rrr  resp: nonlabored breathing  gi: soft, mildly distended, nontender to palpation, staples c/d/i  msk: no c/c/e      I&O's Detail    30 Mar 2019 07:01  -  31 Mar 2019 07:00  --------------------------------------------------------  IN:    sodium chloride 0.9%.: 1500 mL  Total IN: 1500 mL    OUT:  Total OUT: 0 mL    Total NET: 1500 mL      31 Mar 2019 07:01  -  01 Apr 2019 03:07  --------------------------------------------------------  IN:    sodium chloride 0.9%.: 2500 mL  Total IN: 2500 mL    OUT:    Voided: 1 mL  Total OUT: 1 mL    Total NET: 2499 mL          LABS:                        10.1   6.0   )-----------( 347      ( 31 Mar 2019 08:00 )             30.7     03-31    137  |  104  |  <3.0<L>  ----------------------------<  97  3.9   |  21.0<L>  |  0.45<L>    Ca    8.1<L>      31 Mar 2019 08:00  Phos  3.1     03-31  Mg     1.6     03-31            RADIOLOGY & ADDITIONAL STUDIES:

## 2019-04-01 NOTE — DISCHARGE NOTE PROVIDER - NSDCCPTREATMENT_GEN_ALL_CORE_FT
PRINCIPAL PROCEDURE  Procedure: Open lysis of intestinal adhesions  Findings and Treatment:       SECONDARY PROCEDURE  Procedure: Laparotomy, exploratory, with small bowel resection  Findings and Treatment: with lysis of adhesions

## 2019-04-01 NOTE — PROGRESS NOTE ADULT - ASSESSMENT
62 yo F w/ PMH of DM, HTN, HLD and multiple abdominal surgeries including abdominoplasty w/ post-op bleeding requiring ex-lap, hysterectomy, and ventral hernia repair with mesh presenting with abdominal bloating, nausea, and vomiting with evidence of SBO, now POD3, ex-lap with SBR and DANIEL, tolerating reg diet and having BMs    Plan:  -Possibly DC home today

## 2019-04-01 NOTE — DISCHARGE NOTE PROVIDER - NSFOLLOWUPCLINICS_GEN_ALL_ED_FT
[facility] Acute Care Surgery  Acute Care Surgery  [address] [place] [id number]  Phone: [phone]  Fax:   Follow Up Time:

## 2019-04-04 ENCOUNTER — APPOINTMENT (OUTPATIENT)
Dept: TRAUMA SURGERY | Facility: CLINIC | Age: 64
End: 2019-04-04
Payer: COMMERCIAL

## 2019-04-04 VITALS
OXYGEN SATURATION: 98 % | TEMPERATURE: 97.8 F | WEIGHT: 213 LBS | SYSTOLIC BLOOD PRESSURE: 137 MMHG | HEIGHT: 66 IN | DIASTOLIC BLOOD PRESSURE: 88 MMHG | HEART RATE: 64 BPM | BODY MASS INDEX: 34.23 KG/M2

## 2019-04-04 PROCEDURE — 99024 POSTOP FOLLOW-UP VISIT: CPT

## 2019-04-04 NOTE — ASSESSMENT
[FreeTextEntry1] : recommend to take small meals more frequently then she used to. cont with lifting restrictions, f/u next tues to remove skin staples .

## 2019-04-04 NOTE — PHYSICAL EXAM
[de-identified] : a/o x 3 NAD [de-identified] : soft obese, midline healing well , skin staples in place . no cellulitis noted.

## 2019-04-04 NOTE — HISTORY OF PRESENT ILLNESS
[FreeTextEntry1] : s/p exploratory lap with sbr, james for sbo that failed to resolve with non operative management. has had at least two bm / day. mostly tolerating po diet. has issue if eats too much at a time. no fever noted.

## 2019-04-09 ENCOUNTER — APPOINTMENT (OUTPATIENT)
Dept: TRAUMA SURGERY | Facility: CLINIC | Age: 64
End: 2019-04-09
Payer: COMMERCIAL

## 2019-04-09 VITALS
HEIGHT: 66 IN | BODY MASS INDEX: 32.62 KG/M2 | HEART RATE: 67 BPM | SYSTOLIC BLOOD PRESSURE: 103 MMHG | DIASTOLIC BLOOD PRESSURE: 79 MMHG | WEIGHT: 203 LBS | TEMPERATURE: 97.8 F | OXYGEN SATURATION: 97 %

## 2019-04-09 PROCEDURE — 99024 POSTOP FOLLOW-UP VISIT: CPT

## 2019-04-09 NOTE — VITALS
[Aching] : aching [Tender] : tender [Occasional] : occasional [FreeTextEntry1] : rest [FreeTextEntry2] : activity

## 2019-04-09 NOTE — HISTORY OF PRESENT ILLNESS
[FreeTextEntry1] : S/p exploratory lap with sbr, james for sbo that failed to resolve with non operative management. has had at least two bm / day. mostly tolerating po diet. . Patient  denies  any fever  no chills tolerating  diet better  with  BM becoming more regular Patient presents  to [facility]  clinic  today  for  wound  check  and  staple  removal   at  bedside  for  entire  exam

## 2019-04-09 NOTE — PHYSICAL EXAM
[Normal Breath Sounds] : Normal breath sounds [Normal Heart Sounds] : normal heart sounds [Abdominal Masses] : No abdominal masses [Abdomen Tenderness] : ~T ~M Abdominal tenderness [Alert] : alert [No Rash or Lesion] : No rash or lesion [Oriented to Person] : oriented to person [Oriented to Place] : oriented to place [Oriented to Time] : oriented to time [Calm] : calm [de-identified] : wdwn female  in  nad [de-identified] : non icteric sclera  PERRLA  EOMS intact   [de-identified] : from  trachea  midline [de-identified] : soft obese, midline healing well , skin staples in place . no cellulitis noted. all staples  removed  with  skin  edges c/d/i  no  discharge  no  bleeding    tender  to palpation

## 2019-04-09 NOTE — ASSESSMENT
[FreeTextEntry1] : RTC next  week  for  recheck \par Continue  dietary modification \par No heavy lifting greater  than 10-15lbs  or  less if  pain occurs\par any acute symptoms  and  or  concerns  call back [facility] or  go  directly to [facility]\par F/u GI

## 2019-04-09 NOTE — REVIEW OF SYSTEMS
[As Noted in HPI] : as noted in HPI [Constipation] : no constipation [Vomiting] : no vomiting [Abdominal Pain] : abdominal pain [Heartburn] : no heartburn [Diarrhea] : no diarrhea [Melena] : no melena [Negative] : Heme/Lymph [FreeTextEntry7] : s/p SBR/james

## 2019-04-16 ENCOUNTER — APPOINTMENT (OUTPATIENT)
Dept: TRAUMA SURGERY | Facility: CLINIC | Age: 64
End: 2019-04-16
Payer: COMMERCIAL

## 2019-04-16 VITALS
OXYGEN SATURATION: 98 % | SYSTOLIC BLOOD PRESSURE: 130 MMHG | RESPIRATION RATE: 14 BRPM | DIASTOLIC BLOOD PRESSURE: 87 MMHG | HEIGHT: 66 IN | TEMPERATURE: 98.4 F | BODY MASS INDEX: 32.47 KG/M2 | HEART RATE: 81 BPM | WEIGHT: 202 LBS

## 2019-04-16 PROCEDURE — 99024 POSTOP FOLLOW-UP VISIT: CPT

## 2019-04-17 NOTE — REVIEW OF SYSTEMS
[As Noted in HPI] : as noted in HPI [Abdominal Pain] : abdominal pain [Vomiting] : no vomiting [Constipation] : no constipation [Diarrhea] : no diarrhea [Heartburn] : no heartburn [Melena] : no melena [Negative] : Psychiatric [FreeTextEntry7] : s/p SBR/ lysis of adhesions

## 2019-04-17 NOTE — VITALS
[Aching] : aching [Tender] : tender [Occasional] : occasional [FreeTextEntry1] : rest [FreeTextEntry2] : continuos activity

## 2019-04-17 NOTE — HISTORY OF PRESENT ILLNESS
[FreeTextEntry1] : S/p exploratory lap with sbr, james for sbo that failed to resolve with non operative management. . Patient  denies  any fever  no chills tolerating  diet better  with  BM  reactive  to  patients  diet Patient  states"when  eats  foods  high in  sugar  results  in  loose  stools Patient  is  becoming  more  active  with less fatigue Patient presents  to [facility]  clinic  today  for  wound  check l   at  bedside  for  entire  exam

## 2019-04-17 NOTE — PHYSICAL EXAM
[Normal Breath Sounds] : Normal breath sounds [Normal Heart Sounds] : normal heart sounds [Abdominal Masses] : No abdominal masses [Alert] : alert [Abdomen Tenderness] : ~T ~M No abdominal tenderness [No Rash or Lesion] : No rash or lesion [Oriented to Place] : oriented to place [Oriented to Person] : oriented to person [Oriented to Time] : oriented to time [de-identified] : wdwn female  in  nad [Calm] : calm [de-identified] : non icteric sclera  PERRLA  EOMS intact   [de-identified] : from  trachea  midline [de-identified] : soft obese, midline incision  healing well ,  . no cellulitis noted. skin  edges c/d/i  no  discharge  no  bleeding   no  guarding

## 2019-04-17 NOTE — ASSESSMENT
[FreeTextEntry1] : RTC next  week  for  wound  check   and  BM regimen check\par no  heavy  lifting \par any acute  symptoms  and  or  concerns  call back ACS or  go  directly  to [facility]

## 2019-04-25 ENCOUNTER — APPOINTMENT (OUTPATIENT)
Dept: TRAUMA SURGERY | Facility: CLINIC | Age: 64
End: 2019-04-25
Payer: COMMERCIAL

## 2019-04-25 VITALS
SYSTOLIC BLOOD PRESSURE: 131 MMHG | DIASTOLIC BLOOD PRESSURE: 84 MMHG | TEMPERATURE: 97.9 F | OXYGEN SATURATION: 94 % | WEIGHT: 206 LBS | BODY MASS INDEX: 33.11 KG/M2 | HEART RATE: 80 BPM | HEIGHT: 66 IN

## 2019-04-25 PROCEDURE — 99024 POSTOP FOLLOW-UP VISIT: CPT

## 2019-04-26 NOTE — PHYSICAL EXAM
[Normal Breath Sounds] : Normal breath sounds [Normal Heart Sounds] : normal heart sounds [Abdominal Masses] : No abdominal masses [Abdomen Tenderness] : ~T ~M No abdominal tenderness [No Rash or Lesion] : No rash or lesion [Alert] : alert [Oriented to Person] : oriented to person [Oriented to Place] : oriented to place [Oriented to Time] : oriented to time [Calm] : calm [de-identified] : wdwn female  in  nad [de-identified] : non icteric sclera  PERRLA  EOMS intact   [de-identified] : from  trachea  midline [de-identified] : soft obese, midline incision  healing well ,  . no cellulitis noted. skin  edges c/d/i  no  discharge  no  bleeding   no  guarding no  palpable masses

## 2019-04-26 NOTE — ASSESSMENT
[FreeTextEntry1] : Continue  present  medical  management\par any acute  symptoms  and  or  concerns  call back ACS or  go  directly to [facility]\par RTC 2 weeks

## 2019-04-26 NOTE — REVIEW OF SYSTEMS
[As Noted in HPI] : as noted in HPI [Vomiting] : no vomiting [Abdominal Pain] : abdominal pain [Constipation] : no constipation [Diarrhea] : no diarrhea [Heartburn] : no heartburn [Melena] : no melena [Negative] : Heme/Lymph [FreeTextEntry7] : s/p SBR/ lysis of adhesions

## 2019-04-26 NOTE — HISTORY OF PRESENT ILLNESS
[FreeTextEntry1] : S/p exploratory lap with sbr, james for sbo that failed to resolve with non operative management. . Patient  denies  any fever  no chills tolerating  diet better  with  BM  reactive  to  patients  diet Patient  states"when  eats  foods  high in  sugar  results  in  loose  stools Patient  is  becoming  more  active  with less fatigue Patient presents  to [facility]  clinic  today  for  wound  check l   at  bedside  for  entire  exam no  new  physical  complaints

## 2019-05-09 ENCOUNTER — APPOINTMENT (OUTPATIENT)
Dept: TRAUMA SURGERY | Facility: CLINIC | Age: 64
End: 2019-05-09
Payer: COMMERCIAL

## 2019-05-09 ENCOUNTER — APPOINTMENT (OUTPATIENT)
Age: 64
End: 2019-05-09
Payer: COMMERCIAL

## 2019-05-09 VITALS
SYSTOLIC BLOOD PRESSURE: 124 MMHG | RESPIRATION RATE: 16 BRPM | WEIGHT: 212.03 LBS | HEIGHT: 66 IN | TEMPERATURE: 98.5 F | BODY MASS INDEX: 34.08 KG/M2 | OXYGEN SATURATION: 97 % | HEART RATE: 74 BPM | DIASTOLIC BLOOD PRESSURE: 83 MMHG

## 2019-05-09 VITALS
HEIGHT: 66 IN | RESPIRATION RATE: 16 BRPM | DIASTOLIC BLOOD PRESSURE: 76 MMHG | BODY MASS INDEX: 33.75 KG/M2 | OXYGEN SATURATION: 98 % | SYSTOLIC BLOOD PRESSURE: 120 MMHG | HEART RATE: 72 BPM | WEIGHT: 210 LBS

## 2019-05-09 DIAGNOSIS — Z90.49 ACQUIRED ABSENCE OF OTHER SPECIFIED PARTS OF DIGESTIVE TRACT: ICD-10-CM

## 2019-05-09 PROCEDURE — 99024 POSTOP FOLLOW-UP VISIT: CPT

## 2019-05-09 PROCEDURE — 99203 OFFICE O/P NEW LOW 30 MIN: CPT

## 2019-05-09 NOTE — ASSESSMENT
[FreeTextEntry1] : Adequate evolution after ex lap SBR for SBO\par doing well.\par to see a colorectal surgeon about symptomatic hemorrhoids.\par RTO PRN

## 2019-05-10 NOTE — PHYSICAL EXAM
[General Appearance - Alert] : alert [General Appearance - In No Acute Distress] : in no acute distress [Sclera] : the sclera and conjunctiva were normal [PERRL With Normal Accommodation] : pupils were equal in size, round, and reactive to light [Extraocular Movements] : extraocular movements were intact [Outer Ear] : the ears and nose were normal in appearance [Oropharynx] : the oropharynx was normal [Neck Appearance] : the appearance of the neck was normal [Neck Cervical Mass (___cm)] : no neck mass was observed [Jugular Venous Distention Increased] : there was no jugular-venous distention [Thyroid Diffuse Enlargement] : the thyroid was not enlarged [Thyroid Nodule] : there were no palpable thyroid nodules [Auscultation Breath Sounds / Voice Sounds] : lungs were clear to auscultation bilaterally [Heart Rate And Rhythm] : heart rate was normal and rhythm regular [Heart Sounds] : normal S1 and S2 [Heart Sounds Gallop] : no gallops [Murmurs] : no murmurs [Heart Sounds Pericardial Friction Rub] : no pericardial rub [Full Pulse] : the pedal pulses are present [Edema] : there was no peripheral edema [Bowel Sounds] : normal bowel sounds [Abdomen Soft] : soft [Abdomen Tenderness] : non-tender [Abdomen Mass (___ Cm)] : no abdominal mass palpated [Cervical Lymph Nodes Enlarged Posterior Bilaterally] : posterior cervical [Cervical Lymph Nodes Enlarged Anterior Bilaterally] : anterior cervical [Abnormal Walk] : normal gait [Supraclavicular Lymph Nodes Enlarged Bilaterally] : supraclavicular [Motor Tone] : muscle strength and tone were normal [Musculoskeletal - Swelling] : no joint swelling seen [Nail Clubbing] : no clubbing  or cyanosis of the fingernails [Skin Color & Pigmentation] : normal skin color and pigmentation [Skin Turgor] : normal skin turgor [] : no rash [Oriented To Time, Place, And Person] : oriented to person, place, and time [No Focal Deficits] : no focal deficits [Impaired Insight] : insight and judgment were intact [Affect] : the affect was normal [FreeTextEntry1] : Laparotomy scar

## 2019-05-10 NOTE — HISTORY OF PRESENT ILLNESS
[de-identified] : Patient arrived for initial consultation. She was admitted to the [facility] for small bowel obstruction due to adhesions. She had multiple abdominal surgeries and had a hernia repair. Now she has small bowel resection. She is doing well. She is passing bowel movements regularly. She is complaining of hemorrhoids. She had a colonoscopy about 7-8 years ago. It was normal. No further GI issues.

## 2019-05-10 NOTE — ASSESSMENT
[FreeTextEntry1] : The patient is doing fine. I recommended that if she needs hemorrhoidectomy, check with the surgeon whether she needs any colonoscopy. She can followup with me on as-needed basis

## 2019-08-12 ENCOUNTER — APPOINTMENT (OUTPATIENT)
Dept: PULMONOLOGY | Facility: CLINIC | Age: 64
End: 2019-08-12

## 2019-10-02 ENCOUNTER — APPOINTMENT (OUTPATIENT)
Dept: DERMATOLOGY | Facility: CLINIC | Age: 64
End: 2019-10-02

## 2019-10-28 NOTE — PATIENT PROFILE ADULT - NSPROMEDSPATCH_GEN_A_NUR
Patient's first and last name, , procedure, and correct site confirmed prior to the start of procedure.
medication patch(es) used

## 2020-01-24 NOTE — PROGRESS NOTE ADULT - SUBJECTIVE AND OBJECTIVE BOX
Digital Medicine: Clinician Introduction    David Fountain is a 43 y.o. male who is newly enrolled in the Digital Medicine Clinic.    The following information was reviewed and updated:  Preferred pharmacy   [id number] - [place] - [address]  [address] [place] [address]  Phone: [phone] Fax: [phone]    [facility]  [address]  [place] [address]  Phone: [phone] Fax: [phone]      Patient prefers a 90 days supply.     Review of patient's allergies indicates:  No Known Allergies    Called patient to welcome into [facility], will be in contact with patient's wife. Patient endorses adherence to medication regimen. Patient denies hypotensive s/sx (lightheadedness, dizziness, nausea, fatigue); patient denies hypertensive s/sx (SOB, CP, severe headaches, changes in vision). Instructed patient to seek medical care if BP > 180/110 and is accompanied by hypertensive s/sx associated, patient confirms understanding.     Patient's wife denies having questions or concerns. Patient has my contact information and knows to call with any concerns or clinical changes.        The history is provided by the spouse. No  was used.     HYPERTENSION  Our goal is to get BP to consistently below 130/80mmHg and make the process convenient so patient can avoid extra trips to the office. Getting your blood pressure below 130/80mmHg (definition of control) will reduce your risk for heart attack, kidney failure, stroke and death (as well as kidney failure, eye disease, & dementia)      Reviewed non-pharmacologic therapies and impact on BP      Explained that we expect patient to obtain several blood pressures per week at random times of day.  Instructed patient not to allow anyone else to use phone and monitoring device.  Confirmed appropriate BP monitoring technique.      Explained to patient that the digital medicine team is not available for emergencies.   Patient will call [facility] on-call ([phone] or [phone]) or 911 if needed.        Med Review complete.    Allergies reviewed.      Last 5 Patient Entered Readings                                      Current 30 Day Average:      Recent Readings 12/8/2019 11/11/2019    SBP (mmHg) 132 138    DBP (mmHg) 87 75    Pulse 84 -            INTERVENTION(S)  reviewed appropriate dose schedule, encouragement/support and goal setting    PLAN  patient verbalizes understanding, additional monitoring needed and Health  follow up    Assessment:  Reviewed recent readings. Per 2017 ACC/ AHA HTN guidelines (goal of BP < 130/80), current 30-day average needs to be addressed more thoroughly today. Patient and spouse will get in contact [facility]    Plan:  Continue current medication regimen until more readings come through into medical chart. I will continue to monitor regularly and will follow-up in 2 weeks, sooner if blood pressure begins to trend upward or downward.           Topic    Lipid (Cholesterol) Test        Current Medication Regimen:  Hypertension Medications             hydroCHLOROthiazide (HYDRODIURIL) 25 MG tablet Take 1 tablet (25 mg total) by mouth once daily.    losartan (COZAAR) 50 MG tablet Take 1 tablet (50 mg total) by mouth once daily.            Reviewed the importance of self-monitoring, medication adherence, and that the health  can be used as a resource for lifestyle modifications to help reduce or maintain a healthy lifestyle.    Sent link to [facility] [employer] [facility] webpages and my contact information via [employer] for future questions. Follow up scheduled.         Screenings   Subjective: Patient was seen bedside for post-op check. She was resting comfortably in bed. Pain is well controlled with PCA pump. NGT in place. Cole d/c prior to leaving OR. She denies N/V, flatus and BM.       STATUS POST: Laparotomy, exploratory, with small bowel resection and lysis of adhesions.     POST OPERATIVE DAY #: 0    MEDICATIONS  (STANDING):  benzocaine 15 mG/menthol 3.6 mG Lozenge 1 Lozenge Oral every 6 hours  dextrose 5%. 1000 milliLiter(s) (50 mL/Hr) IV Continuous <Continuous>  HYDROmorphone PCA (1 mG/mL) 30 milliLiter(s) PCA Continuous PCA Continuous  insulin lispro (HumaLOG) corrective regimen sliding scale   SubCutaneous every 6 hours  lactated ringers. 1000 milliLiter(s) (125 mL/Hr) IV Continuous <Continuous>    MEDICATIONS  (PRN):  famotidine  IVPB 20 milliGRAM(s) IV Intermittent two times a day PRN heartburn  fentaNYL    Injectable 25 MICROGram(s) IV Push every 5 minutes PRN Moderate Pain (4 - 6)  HYDROmorphone  Injectable 0.5 milliGRAM(s) IV Push every 6 hours PRN Mild Pain (1 - 3)  HYDROmorphone  Injectable 0.5 milliGRAM(s) IV Push every 4 hours PRN Moderate Pain (4 - 6)  naloxone Injectable 0.1 milliGRAM(s) IV Push every 3 minutes PRN For ANY of the following changes in patient status:  A. RR LESS THAN 10 breaths per minute, B. Oxygen saturation LESS THAN 90%, C. Sedation score of 6  ondansetron Injectable 4 milliGRAM(s) IV Push every 6 hours PRN Nausea  ondansetron Injectable 4 milliGRAM(s) IV Push once PRN Nausea and/or Vomiting      Vital Signs Last 24 Hrs  T(C): 36.6 (27 Mar 2019 10:21), Max: 37.3 (26 Mar 2019 16:27)  T(F): 97.8 (27 Mar 2019 10:21), Max: 99.2 (26 Mar 2019 16:27)  HR: 102 (27 Mar 2019 14:00) (70 - 108)  BP: 108/76 (27 Mar 2019 14:00) (98/70 - 137/98)  BP(mean): --  RR: 16 (27 Mar 2019 14:00) (9 - 21)  SpO2: 99% (27 Mar 2019 14:00) (85% - 99%)      03-26 - 03-27  --------------------------------------------------------  IN:  Total IN: 0 mL    OUT:    Nasoenteral Tube: 1400 mL  Total OUT: 1400 mL    Total NET: -1400 mL      03-27 - 03-27  --------------------------------------------------------  IN:  Total IN: 0 mL    OUT:    Nasoenteral Tube: 750 mL  Total OUT: 750 mL    Total NET: -750 mL          Physical Exam:    Constitutional: NAD  HEENT: PERRL, EOMI  Neck: No JVD, FROM without pain  Respiratory: Respirations non-labored, no accessory muscle use  Cardiovascular: Regular rate & rhythm, S1, S2  Gastrointestinal: Midline incision dressing c/d/i, abdomen soft, appropriately tender, non-distended  Extremities: No peripheral edema, No cyanosis  Neurological: A&O x 3; without gross deficit  Musculoskeletal: No joint pain, swelling, deformity, or point tenderness; no limitation of movement      LABS:                        13.4   9.6   )-----------( 360      ( 26 Mar 2019 07:27 )             40.6     03-26    135  |  93<L>  |  13.0  ----------------------------<  158<H>  4.4   |  27.0  |  0.65    Ca    8.9      26 Mar 2019 07:27  Mg     2.2     03-26

## 2020-11-03 ENCOUNTER — APPOINTMENT (OUTPATIENT)
Dept: SURGERY | Facility: CLINIC | Age: 65
End: 2020-11-03
Payer: COMMERCIAL

## 2020-11-03 VITALS
HEIGHT: 66 IN | BODY MASS INDEX: 36.64 KG/M2 | DIASTOLIC BLOOD PRESSURE: 94 MMHG | OXYGEN SATURATION: 95 % | SYSTOLIC BLOOD PRESSURE: 148 MMHG | WEIGHT: 228 LBS | TEMPERATURE: 97.1 F | HEART RATE: 78 BPM

## 2020-11-03 PROCEDURE — 99215 OFFICE O/P EST HI 40 MIN: CPT

## 2020-11-03 PROCEDURE — 99072 ADDL SUPL MATRL&STAF TM PHE: CPT

## 2020-11-03 NOTE — ASSESSMENT
[FreeTextEntry1] : r/o Recurrent incisional hernia following Exp Laparotomy, DANIEL and SB resection in March of 2019.  As per operative report the previously placed ventral hernia mesh was violated during laparotomy and sutured together with Ethibond suture upon Laparotomy closure.  This may failed to have healed properly resulting in recurrent herniation.  Given extent of surgical history repeat imaging would be prudent to r/o hernia vs diastasis and plan accordingly with repair as indicated.\par \par Surgical repair may require laparoscopic approach vs open approach with component separation and primary closure of midline should recurrent herniation be identified.  Risk, Benefits, and Alternatives to surgery have been discussed.  This includes but is not limited to bleeding, infection, damage to adjacent structures, need for additional surgery or interventions, adverse effects of anesthesia such as cardio-respiratory complications, prolonged intubation, cardiac arrhythmia, arrest, and or death.  Risks of forgoing surgery have also been discussed including progression of, and/or worsening of current condition which may then require urgent or emergent treatment or surgery.\par \par CT Abd/Pelvis with PO/IV contrast ordered to assess for recurrent herniation.  Will discuss results with patient once complete and plan for furhter intervention if indicated.\par \par f/u with me upon completion of CT scan\par \par Weight loss.  Nutritional counseling has been provided. The patient is encouraged to remain calorie conscious and continue a low fat, low carbohydrate, high protein diet. Also, emphasis has been placed on the importance of adequate hydration, multi-vitamin supplementation and exercise.  (15 min)  Preoperative weight loss would facilitate surgical repair and reduce operative and post operative complications.\par \par DM.  Maintain tight glycemic control.

## 2020-11-03 NOTE — PHYSICAL EXAM
[Normal Breath Sounds] : Normal breath sounds [Normal Heart Sounds] : normal heart sounds [Normal Rate and Rhythm] : normal rate and rhythm [No Rash or Lesion] : No rash or lesion [Alert] : alert [Oriented to Person] : oriented to person [Oriented to Place] : oriented to place [Oriented to Time] : oriented to time [Calm] : calm [de-identified] : Healthy-appearing woman in no acute distress. [de-identified] : NCAT, PERRLA, EOMI. Oral and pharyngeal mucosa pink and moist [de-identified] : Supple, no masses, no lymphadenopathy, no jugular venous distention. [de-identified] : Soft, nontender nondistended, positive bowel sounds in all four quads.   No rebound or guarding.  Surgical scar midline, laproscopic scars, and transverse lower abdominal scar consistent with surgical history.  Budging in mid abdomen, diastasis vs recurrent incisional hernia.  \par  [de-identified] : FROM, strength equal bilaterally ambulating without difficulty.

## 2020-11-03 NOTE — CONSULT LETTER
[Dear  ___] : Dear  [unfilled], [Courtesy Letter:] : I had the pleasure of seeing your patient, [unfilled], in my office today. [Please see my note below.] : Please see my note below. [Consult Closing:] : Thank you very much for allowing me to participate in the care of this patient.  If you have any questions, please do not hesitate to contact me. [Sincerely,] : Sincerely, [FreeTextEntry3] : Brett Ratliff MD FACS FASMBS\par Advanced Laparoscopic, General & Bariatric Surgery\par Chair, Dept of Surgery, [facility]\par [facility] Physician [facility]\par [address] [place] [address]\[place] [address]\par P: [phone]\par F: [phone]\par

## 2020-11-03 NOTE — HISTORY OF PRESENT ILLNESS
[de-identified] : Known to me from previous cholecystectomy and repair of incisional hernias.\par Reports hospitalization in March 2019 for SBO at which time she underwent Exp Lap with DANIEL and segmental SB resection.  Now with recurrent bulge of midabdomen concerning for recurrent incisional hernia.

## 2020-11-10 ENCOUNTER — APPOINTMENT (OUTPATIENT)
Dept: CT IMAGING | Facility: CLINIC | Age: 65
End: 2020-11-10
Payer: COMMERCIAL

## 2020-11-10 ENCOUNTER — TRANSCRIPTION ENCOUNTER (OUTPATIENT)
Age: 65
End: 2020-11-10

## 2020-11-10 ENCOUNTER — OUTPATIENT (OUTPATIENT)
Dept: OUTPATIENT SERVICES | Facility: HOSPITAL | Age: 65
LOS: 1 days | End: 2020-11-10
Payer: COMMERCIAL

## 2020-11-10 DIAGNOSIS — K43.2 INCISIONAL HERNIA WITHOUT OBSTRUCTION OR GANGRENE: ICD-10-CM

## 2020-11-10 DIAGNOSIS — Z90.710 ACQUIRED ABSENCE OF BOTH CERVIX AND UTERUS: Chronic | ICD-10-CM

## 2020-11-10 DIAGNOSIS — Z98.89 OTHER SPECIFIED POSTPROCEDURAL STATES: Chronic | ICD-10-CM

## 2020-11-10 DIAGNOSIS — Z90.49 ACQUIRED ABSENCE OF OTHER SPECIFIED PARTS OF DIGESTIVE TRACT: Chronic | ICD-10-CM

## 2020-11-10 PROCEDURE — 74177 CT ABD & PELVIS W/CONTRAST: CPT

## 2020-11-10 PROCEDURE — 74177 CT ABD & PELVIS W/CONTRAST: CPT | Mod: 26

## 2020-11-10 PROCEDURE — 82565 ASSAY OF CREATININE: CPT

## 2020-11-13 ENCOUNTER — APPOINTMENT (OUTPATIENT)
Dept: DERMATOLOGY | Facility: CLINIC | Age: 65
End: 2020-11-13
Payer: COMMERCIAL

## 2020-11-13 PROCEDURE — 99072 ADDL SUPL MATRL&STAF TM PHE: CPT

## 2020-11-13 PROCEDURE — 99214 OFFICE O/P EST MOD 30 MIN: CPT

## 2020-12-01 ENCOUNTER — APPOINTMENT (OUTPATIENT)
Dept: SURGERY | Facility: CLINIC | Age: 65
End: 2020-12-01
Payer: COMMERCIAL

## 2020-12-01 VITALS
OXYGEN SATURATION: 98 % | HEIGHT: 66 IN | SYSTOLIC BLOOD PRESSURE: 139 MMHG | WEIGHT: 228 LBS | BODY MASS INDEX: 36.64 KG/M2 | HEART RATE: 70 BPM | TEMPERATURE: 96.8 F | DIASTOLIC BLOOD PRESSURE: 94 MMHG

## 2020-12-01 DIAGNOSIS — E11.9 TYPE 2 DIABETES MELLITUS W/OUT COMPLICATIONS: ICD-10-CM

## 2020-12-01 DIAGNOSIS — E66.9 OBESITY, UNSPECIFIED: ICD-10-CM

## 2020-12-01 DIAGNOSIS — G47.33 OBSTRUCTIVE SLEEP APNEA (ADULT) (PEDIATRIC): ICD-10-CM

## 2020-12-01 PROCEDURE — 99072 ADDL SUPL MATRL&STAF TM PHE: CPT

## 2020-12-01 PROCEDURE — 99215 OFFICE O/P EST HI 40 MIN: CPT

## 2020-12-04 PROBLEM — G47.33 OSA (OBSTRUCTIVE SLEEP APNEA): Status: ACTIVE | Noted: 2018-01-23

## 2020-12-04 NOTE — HISTORY OF PRESENT ILLNESS
[de-identified] : Known to me from previous cholecystectomy and repair of incisional hernias.\par Reports hospitalization in March 2019 for SBO at which time she underwent Exp Lap with DANIEL and segmental SB resection.  Now with recurrent bulge of midabdomen recurrent incisional hernia.

## 2020-12-04 NOTE — PHYSICAL EXAM
[Normal Breath Sounds] : Normal breath sounds [Normal Heart Sounds] : normal heart sounds [Normal Rate and Rhythm] : normal rate and rhythm [No Rash or Lesion] : No rash or lesion [Alert] : alert [Oriented to Person] : oriented to person [Oriented to Place] : oriented to place [Oriented to Time] : oriented to time [Calm] : calm [de-identified] : Healthy-appearing woman in no acute distress. [de-identified] : NCAT, PERRLA, EOMI. Oral and pharyngeal mucosa pink and moist [de-identified] : Supple, no masses, no lymphadenopathy, no jugular venous distention. [de-identified] : Soft, nontender nondistended, positive bowel sounds in all four quads.   No rebound or guarding.  Surgical scar midline, laproscopic scars, and transverse lower abdominal scar consistent with surgical history.  Budging in mid abdomen, diastasis vs recurrent incisional hernia.  \par  [de-identified] : FROM, strength equal bilaterally ambulating without difficulty.

## 2020-12-04 NOTE — ASSESSMENT
[FreeTextEntry1] : Multiple previous ventral/Incisional hernia repairs with Vicryl and Synthetic mesh (Parietex [id number]).  \par Large Incisional hernia following emergent Exp Lap and SB resection at [place] in 2019.\par CT imaging personally reviewed confirming diagnosis.  Herniation at upper margin of incision, fat containing, as well as at the umbilical level containing multiple loops of SB.  Fortunately, no overt signs of obstruction.\par \par Will require complex hernia repair.  Given history of multiple previous laparoscopic approaches and given the size of the defect, Open repair with possible component separation is likely.\par \par Pt had initial hernia repair at [facility] in conjunction with Plastic surgeon for Panniculectomy.  unclear if myocutaneous flaps had already been performed.  Will try to obtain pervious operative records.  \par I've reached out to Dr Kaylene Candelaria (plastic Surgeon) who will have her office pull the chart and fwd to me her operative dictation.  Unfortunately at this time  the patient is uncertain who the original surgeon was for the herniorrhaphy.  She has been asked to obtain her operative records from [facility].\par \par Will plan for surgical repair in January.\par \par Signs and symptoms of incarceration/strangulation/obstruction have been reviewed with the patient.  Should such symptoms present, urgent medical attention should be sought.  Contact my office immediately and or head to your nearest emergency room for evaluation and treatment.  This may require immediate surgical repair.\par \par \par

## 2020-12-17 ENCOUNTER — APPOINTMENT (OUTPATIENT)
Dept: DERMATOLOGY | Facility: CLINIC | Age: 65
End: 2020-12-17

## 2020-12-30 ENCOUNTER — OUTPATIENT (OUTPATIENT)
Dept: OUTPATIENT SERVICES | Facility: HOSPITAL | Age: 65
LOS: 1 days | End: 2020-12-30
Payer: COMMERCIAL

## 2020-12-30 VITALS
OXYGEN SATURATION: 98 % | TEMPERATURE: 97 F | WEIGHT: 207.01 LBS | HEART RATE: 77 BPM | SYSTOLIC BLOOD PRESSURE: 145 MMHG | RESPIRATION RATE: 16 BRPM | DIASTOLIC BLOOD PRESSURE: 84 MMHG

## 2020-12-30 DIAGNOSIS — K43.2 INCISIONAL HERNIA WITHOUT OBSTRUCTION OR GANGRENE: ICD-10-CM

## 2020-12-30 DIAGNOSIS — Z01.818 ENCOUNTER FOR OTHER PREPROCEDURAL EXAMINATION: ICD-10-CM

## 2020-12-30 DIAGNOSIS — Z90.710 ACQUIRED ABSENCE OF BOTH CERVIX AND UTERUS: Chronic | ICD-10-CM

## 2020-12-30 DIAGNOSIS — Z98.890 OTHER SPECIFIED POSTPROCEDURAL STATES: Chronic | ICD-10-CM

## 2020-12-30 DIAGNOSIS — Z98.89 OTHER SPECIFIED POSTPROCEDURAL STATES: Chronic | ICD-10-CM

## 2020-12-30 DIAGNOSIS — G47.33 OBSTRUCTIVE SLEEP APNEA (ADULT) (PEDIATRIC): ICD-10-CM

## 2020-12-30 DIAGNOSIS — Z90.49 ACQUIRED ABSENCE OF OTHER SPECIFIED PARTS OF DIGESTIVE TRACT: Chronic | ICD-10-CM

## 2020-12-30 LAB
A1C WITH ESTIMATED AVERAGE GLUCOSE RESULT: 8.2 % — HIGH (ref 4–5.6)
ALBUMIN SERPL ELPH-MCNC: 3.6 G/DL — SIGNIFICANT CHANGE UP (ref 3.3–5)
ALP SERPL-CCNC: 107 U/L — SIGNIFICANT CHANGE UP (ref 40–120)
ALT FLD-CCNC: 30 U/L — SIGNIFICANT CHANGE UP (ref 12–78)
ANION GAP SERPL CALC-SCNC: 6 MMOL/L — SIGNIFICANT CHANGE UP (ref 5–17)
AST SERPL-CCNC: 15 U/L — SIGNIFICANT CHANGE UP (ref 15–37)
BILIRUB SERPL-MCNC: 0.2 MG/DL — SIGNIFICANT CHANGE UP (ref 0.2–1.2)
BUN SERPL-MCNC: 19 MG/DL — SIGNIFICANT CHANGE UP (ref 7–23)
CALCIUM SERPL-MCNC: 9.1 MG/DL — SIGNIFICANT CHANGE UP (ref 8.5–10.1)
CHLORIDE SERPL-SCNC: 105 MMOL/L — SIGNIFICANT CHANGE UP (ref 96–108)
CO2 SERPL-SCNC: 28 MMOL/L — SIGNIFICANT CHANGE UP (ref 22–31)
CREAT SERPL-MCNC: 0.84 MG/DL — SIGNIFICANT CHANGE UP (ref 0.5–1.3)
ESTIMATED AVERAGE GLUCOSE: 189 MG/DL — HIGH (ref 68–114)
GLUCOSE SERPL-MCNC: 243 MG/DL — HIGH (ref 70–99)
HCT VFR BLD CALC: 42.8 % — SIGNIFICANT CHANGE UP (ref 34.5–45)
HGB BLD-MCNC: 14 G/DL — SIGNIFICANT CHANGE UP (ref 11.5–15.5)
MCHC RBC-ENTMCNC: 29.2 PG — SIGNIFICANT CHANGE UP (ref 27–34)
MCHC RBC-ENTMCNC: 32.7 GM/DL — SIGNIFICANT CHANGE UP (ref 32–36)
MCV RBC AUTO: 89.2 FL — SIGNIFICANT CHANGE UP (ref 80–100)
NRBC # BLD: 0 /100 WBCS — SIGNIFICANT CHANGE UP (ref 0–0)
PLATELET # BLD AUTO: 359 K/UL — SIGNIFICANT CHANGE UP (ref 150–400)
POTASSIUM SERPL-MCNC: 4.6 MMOL/L — SIGNIFICANT CHANGE UP (ref 3.5–5.3)
POTASSIUM SERPL-SCNC: 4.6 MMOL/L — SIGNIFICANT CHANGE UP (ref 3.5–5.3)
PROT SERPL-MCNC: 7.6 G/DL — SIGNIFICANT CHANGE UP (ref 6–8.3)
RBC # BLD: 4.8 M/UL — SIGNIFICANT CHANGE UP (ref 3.8–5.2)
RBC # FLD: 13 % — SIGNIFICANT CHANGE UP (ref 10.3–14.5)
SODIUM SERPL-SCNC: 139 MMOL/L — SIGNIFICANT CHANGE UP (ref 135–145)
WBC # BLD: 9.26 K/UL — SIGNIFICANT CHANGE UP (ref 3.8–10.5)
WBC # FLD AUTO: 9.26 K/UL — SIGNIFICANT CHANGE UP (ref 3.8–10.5)

## 2020-12-30 PROCEDURE — 85027 COMPLETE CBC AUTOMATED: CPT

## 2020-12-30 PROCEDURE — 93005 ELECTROCARDIOGRAM TRACING: CPT

## 2020-12-30 PROCEDURE — G0463: CPT

## 2020-12-30 PROCEDURE — 83036 HEMOGLOBIN GLYCOSYLATED A1C: CPT

## 2020-12-30 PROCEDURE — 93010 ELECTROCARDIOGRAM REPORT: CPT

## 2020-12-30 PROCEDURE — 80053 COMPREHEN METABOLIC PANEL: CPT

## 2020-12-30 PROCEDURE — 36415 COLL VENOUS BLD VENIPUNCTURE: CPT

## 2020-12-30 RX ORDER — DAPAGLIFLOZIN 10 MG/1
1 TABLET, FILM COATED ORAL
Qty: 0 | Refills: 0 | DISCHARGE

## 2020-12-30 NOTE — H&P PST ADULT - NSICDXPASTSURGICALHX_GEN_ALL_CORE_FT
PAST SURGICAL HISTORY:  S/P breast lumpectomy (Benign) right breast 2000    S/P exploratory laparotomy s/p tummy tuck for post op bleeding 2014    S/P hernia repair "Temporary ventral hernia surgery" (1/5/15)    S/P total hysterectomy with removal of both tubes and ovaries March 2013    Status post abdominoplasty with hernia repair April 2014    Status post cholecystectomy 01/05/2015     PAST SURGICAL HISTORY:  S/P breast lumpectomy (Benign) right breast 2000    S/P exploratory laparotomy s/p tummy tuck for post op bleeding 2014    S/P exploratory laparotomy with DANIEL and segmental SB resection, 3/2019    S/P hernia repair "Temporary ventral hernia surgery" (1/5/15), Incisional hernia repair (3/2015)    S/P total hysterectomy with removal of both tubes and ovaries March 2013    Status post abdominoplasty with hernia repair April 2014    Status post cholecystectomy 01/05/2015

## 2020-12-30 NOTE — H&P PST ADULT - HISTORY OF PRESENT ILLNESS
Pt is a 58yo female with PMH of DM and HTN here for PST. Pt s/p Lap Cholecystectomy on 1/5/15 and "temporary hernia repair done at the same time due to scarring from past surgeries". Pt with ventral hernia and pt reports to mid-abdominal hernia pain to be 6/10. Pt does not take po analgesia. Pt wears abdominal binder for support and pain relief from ventral hernia. Pt denies n/v/d. Pt electing for laparoscopic repair recurrent incisional hernia with mesh on 3/30/15.     Pt with physical exertion   Pt started to have abdominal pain about 6 months ago.   Pt rates abdominal pain to be 7/10 but denies taking po analgesia.   Pt denies n/v/d and constipation.  64 y/o with PMH of Type 2 DM and HLD here for PST. Pt s/p multiple incisional hernia surgery and s/p Exp Lap with DANIEL and segmental SB resection for SBO in 3/2019. Pt started to have abdominal pain about 6 months ago worse with physical exertion. Pt rates abdominal pain to be 7/10 but denies taking po analgesia. Pt denies n/v/d and constipation. Pt s/p CT scan - surgical mesh ventral abdominal wall with diastasis. There are also hernias within the region of the diastasis. Pt electing for repair recurrent incisional hernia, possible component separation on 1/11/2021.

## 2020-12-30 NOTE — H&P PST ADULT - NSICDXPROBLEM_GEN_ALL_CORE_FT
PROBLEM DIAGNOSES  Problem: Preoperative testing  Assessment and Plan: Medical clearance needed as per surgeon. CBC, Comprehensive panel, HgA1c and EKG ordered. Pre-op instructions and surgical scrubs given and pt verbalized understanding. Pt will make the appt for the COVID19 PCR testing at the [facility] testing site 3 days before surgery.       Problem: KATIUSKA (obstructive sleep apnea)  Assessment and Plan: Pt informed of need for extended stay post op if deemed necessary by anesthesiologist. Pt verbalized understanding.     Problem: Incisional hernia without obstruction or gangrene  Assessment and Plan: Repair recurrent incisional hernia, possible component separation on 1/11/2021.

## 2020-12-30 NOTE — H&P PST ADULT - NSICDXPASTMEDICALHX_GEN_ALL_CORE_FT
PAST MEDICAL HISTORY:  Hyperlipidemia     Hypertension     Obesity     Type 2 diabetes mellitus      PAST MEDICAL HISTORY:  Chronic GERD     Hyperlipidemia     Hypertension     Incisional hernia without obstruction or gangrene     Obesity     KATIUSKA (obstructive sleep apnea) Pt does not use CPAP at night    Type 2 diabetes mellitus

## 2020-12-30 NOTE — H&P PST ADULT - GENERAL COMMENTS
Pt denies history of travel outside the country and outside [facility], denies having had the COVID19 infection and denies COVID19 positive contacts within the last 14 days.

## 2020-12-30 NOTE — H&P PST ADULT - NSICDXFAMILYHX_GEN_ALL_CORE_FT
FAMILY HISTORY:  Family history of MS (multiple sclerosis), (Father )  FH: CAD (coronary artery disease), (Mother )  FH: cirrhosis, (Brother )

## 2020-12-31 PROBLEM — K21.9 GASTRO-ESOPHAGEAL REFLUX DISEASE WITHOUT ESOPHAGITIS: Chronic | Status: ACTIVE | Noted: 2020-12-30

## 2020-12-31 PROBLEM — K43.2 INCISIONAL HERNIA WITHOUT OBSTRUCTION OR GANGRENE: Chronic | Status: ACTIVE | Noted: 2020-12-30

## 2020-12-31 PROBLEM — E11.9 TYPE 2 DIABETES MELLITUS WITHOUT COMPLICATIONS: Chronic | Status: ACTIVE | Noted: 2020-12-30

## 2020-12-31 PROBLEM — G47.33 OBSTRUCTIVE SLEEP APNEA (ADULT) (PEDIATRIC): Chronic | Status: ACTIVE | Noted: 2020-12-30

## 2021-01-07 DIAGNOSIS — Z01.818 ENCOUNTER FOR OTHER PREPROCEDURAL EXAMINATION: ICD-10-CM

## 2021-01-08 ENCOUNTER — APPOINTMENT (OUTPATIENT)
Dept: DISASTER EMERGENCY | Facility: CLINIC | Age: 66
End: 2021-01-08

## 2021-01-10 ENCOUNTER — TRANSCRIPTION ENCOUNTER (OUTPATIENT)
Age: 66
End: 2021-01-10

## 2021-01-11 ENCOUNTER — RESULT REVIEW (OUTPATIENT)
Age: 66
End: 2021-01-11

## 2021-01-11 ENCOUNTER — APPOINTMENT (OUTPATIENT)
Dept: SURGERY | Facility: HOSPITAL | Age: 66
End: 2021-01-11

## 2021-01-11 ENCOUNTER — INPATIENT (INPATIENT)
Facility: HOSPITAL | Age: 66
LOS: 1 days | Discharge: HOME | DRG: 336 | End: 2021-01-13
Attending: SURGERY | Admitting: SURGERY
Payer: COMMERCIAL

## 2021-01-11 VITALS
WEIGHT: 207.01 LBS | RESPIRATION RATE: 16 BRPM | SYSTOLIC BLOOD PRESSURE: 133 MMHG | HEIGHT: 66 IN | DIASTOLIC BLOOD PRESSURE: 72 MMHG | OXYGEN SATURATION: 96 % | HEART RATE: 70 BPM | TEMPERATURE: 98 F

## 2021-01-11 DIAGNOSIS — Z98.89 OTHER SPECIFIED POSTPROCEDURAL STATES: Chronic | ICD-10-CM

## 2021-01-11 DIAGNOSIS — K43.2 INCISIONAL HERNIA WITHOUT OBSTRUCTION OR GANGRENE: ICD-10-CM

## 2021-01-11 DIAGNOSIS — Z98.890 OTHER SPECIFIED POSTPROCEDURAL STATES: Chronic | ICD-10-CM

## 2021-01-11 DIAGNOSIS — Z90.49 ACQUIRED ABSENCE OF OTHER SPECIFIED PARTS OF DIGESTIVE TRACT: Chronic | ICD-10-CM

## 2021-01-11 DIAGNOSIS — Z90.710 ACQUIRED ABSENCE OF BOTH CERVIX AND UTERUS: Chronic | ICD-10-CM

## 2021-01-11 PROCEDURE — 88302 TISSUE EXAM BY PATHOLOGIST: CPT | Mod: 26

## 2021-01-11 PROCEDURE — 15734 MUSCLE-SKIN GRAFT TRUNK: CPT | Mod: AS

## 2021-01-11 PROCEDURE — 88304 TISSUE EXAM BY PATHOLOGIST: CPT | Mod: 26

## 2021-01-11 PROCEDURE — 49565: CPT | Mod: AS

## 2021-01-11 PROCEDURE — 15734 MUSCLE-SKIN GRAFT TRUNK: CPT

## 2021-01-11 PROCEDURE — 49565: CPT

## 2021-01-11 PROCEDURE — 49568: CPT

## 2021-01-11 PROCEDURE — 49568: CPT | Mod: AS

## 2021-01-11 RX ORDER — CEFAZOLIN SODIUM 1 G
2000 VIAL (EA) INJECTION EVERY 8 HOURS
Refills: 0 | Status: DISCONTINUED | OUTPATIENT
Start: 2021-01-11 | End: 2021-01-13

## 2021-01-11 RX ORDER — DEXTROSE 50 % IN WATER 50 %
15 SYRINGE (ML) INTRAVENOUS ONCE
Refills: 0 | Status: DISCONTINUED | OUTPATIENT
Start: 2021-01-11 | End: 2021-01-11

## 2021-01-11 RX ORDER — SODIUM CHLORIDE 9 MG/ML
1000 INJECTION, SOLUTION INTRAVENOUS
Refills: 0 | Status: DISCONTINUED | OUTPATIENT
Start: 2021-01-11 | End: 2021-01-11

## 2021-01-11 RX ORDER — ACETAMINOPHEN 500 MG
975 TABLET ORAL EVERY 6 HOURS
Refills: 0 | Status: DISCONTINUED | OUTPATIENT
Start: 2021-01-11 | End: 2021-01-13

## 2021-01-11 RX ORDER — SODIUM CHLORIDE 9 MG/ML
1000 INJECTION, SOLUTION INTRAVENOUS
Refills: 0 | Status: DISCONTINUED | OUTPATIENT
Start: 2021-01-11 | End: 2021-01-13

## 2021-01-11 RX ORDER — DEXTROSE 50 % IN WATER 50 %
12.5 SYRINGE (ML) INTRAVENOUS ONCE
Refills: 0 | Status: DISCONTINUED | OUTPATIENT
Start: 2021-01-11 | End: 2021-01-13

## 2021-01-11 RX ORDER — GLUCAGON INJECTION, SOLUTION 0.5 MG/.1ML
1 INJECTION, SOLUTION SUBCUTANEOUS ONCE
Refills: 0 | Status: DISCONTINUED | OUTPATIENT
Start: 2021-01-11 | End: 2021-01-11

## 2021-01-11 RX ORDER — PANTOPRAZOLE SODIUM 20 MG/1
20 TABLET, DELAYED RELEASE ORAL
Refills: 0 | Status: DISCONTINUED | OUTPATIENT
Start: 2021-01-11 | End: 2021-01-13

## 2021-01-11 RX ORDER — VENLAFAXINE HCL 75 MG
1 CAPSULE, EXT RELEASE 24 HR ORAL
Qty: 0 | Refills: 0 | DISCHARGE

## 2021-01-11 RX ORDER — CEFAZOLIN SODIUM 1 G
2000 VIAL (EA) INJECTION ONCE
Refills: 0 | Status: DISCONTINUED | OUTPATIENT
Start: 2021-01-11 | End: 2021-01-11

## 2021-01-11 RX ORDER — ENOXAPARIN SODIUM 100 MG/ML
40 INJECTION SUBCUTANEOUS DAILY
Refills: 0 | Status: DISCONTINUED | OUTPATIENT
Start: 2021-01-12 | End: 2021-01-13

## 2021-01-11 RX ORDER — DEXTROSE 50 % IN WATER 50 %
25 SYRINGE (ML) INTRAVENOUS ONCE
Refills: 0 | Status: DISCONTINUED | OUTPATIENT
Start: 2021-01-11 | End: 2021-01-13

## 2021-01-11 RX ORDER — HYDROMORPHONE HYDROCHLORIDE 2 MG/ML
0.5 INJECTION INTRAMUSCULAR; INTRAVENOUS; SUBCUTANEOUS
Refills: 0 | Status: DISCONTINUED | OUTPATIENT
Start: 2021-01-11 | End: 2021-01-11

## 2021-01-11 RX ORDER — LINAGLIPTIN AND METFORMIN HYDROCHLORIDE 2.5; 85 MG/1; MG/1
1 TABLET, FILM COATED ORAL
Qty: 0 | Refills: 0 | DISCHARGE

## 2021-01-11 RX ORDER — DEXTROSE 50 % IN WATER 50 %
12.5 SYRINGE (ML) INTRAVENOUS ONCE
Refills: 0 | Status: DISCONTINUED | OUTPATIENT
Start: 2021-01-11 | End: 2021-01-11

## 2021-01-11 RX ORDER — HYDROMORPHONE HYDROCHLORIDE 2 MG/ML
1 INJECTION INTRAMUSCULAR; INTRAVENOUS; SUBCUTANEOUS
Refills: 0 | Status: DISCONTINUED | OUTPATIENT
Start: 2021-01-11 | End: 2021-01-11

## 2021-01-11 RX ORDER — SIMVASTATIN 20 MG/1
5 TABLET, FILM COATED ORAL AT BEDTIME
Refills: 0 | Status: DISCONTINUED | OUTPATIENT
Start: 2021-01-11 | End: 2021-01-13

## 2021-01-11 RX ORDER — ACETAMINOPHEN 500 MG
975 TABLET ORAL EVERY 8 HOURS
Refills: 0 | Status: DISCONTINUED | OUTPATIENT
Start: 2021-01-11 | End: 2021-01-11

## 2021-01-11 RX ORDER — DEXTROSE 50 % IN WATER 50 %
25 SYRINGE (ML) INTRAVENOUS ONCE
Refills: 0 | Status: DISCONTINUED | OUTPATIENT
Start: 2021-01-11 | End: 2021-01-11

## 2021-01-11 RX ORDER — INSULIN LISPRO 100/ML
VIAL (ML) SUBCUTANEOUS
Refills: 0 | Status: DISCONTINUED | OUTPATIENT
Start: 2021-01-11 | End: 2021-01-13

## 2021-01-11 RX ORDER — BUPIVACAINE 13.3 MG/ML
20 INJECTION, SUSPENSION, LIPOSOMAL INFILTRATION ONCE
Refills: 0 | Status: DISCONTINUED | OUTPATIENT
Start: 2021-01-11 | End: 2021-01-13

## 2021-01-11 RX ORDER — DEXTROSE 50 % IN WATER 50 %
15 SYRINGE (ML) INTRAVENOUS ONCE
Refills: 0 | Status: DISCONTINUED | OUTPATIENT
Start: 2021-01-11 | End: 2021-01-13

## 2021-01-11 RX ORDER — KETOROLAC TROMETHAMINE 30 MG/ML
15 SYRINGE (ML) INJECTION EVERY 6 HOURS
Refills: 0 | Status: DISCONTINUED | OUTPATIENT
Start: 2021-01-11 | End: 2021-01-13

## 2021-01-11 RX ORDER — VENLAFAXINE HCL 75 MG
37.5 CAPSULE, EXT RELEASE 24 HR ORAL DAILY
Refills: 0 | Status: DISCONTINUED | OUTPATIENT
Start: 2021-01-11 | End: 2021-01-13

## 2021-01-11 RX ORDER — GLUCAGON INJECTION, SOLUTION 0.5 MG/.1ML
1 INJECTION, SOLUTION SUBCUTANEOUS ONCE
Refills: 0 | Status: DISCONTINUED | OUTPATIENT
Start: 2021-01-11 | End: 2021-01-13

## 2021-01-11 RX ORDER — ONDANSETRON 8 MG/1
4 TABLET, FILM COATED ORAL ONCE
Refills: 0 | Status: COMPLETED | OUTPATIENT
Start: 2021-01-11 | End: 2021-01-11

## 2021-01-11 RX ORDER — TRAMADOL HYDROCHLORIDE 50 MG/1
50 TABLET ORAL EVERY 6 HOURS
Refills: 0 | Status: DISCONTINUED | OUTPATIENT
Start: 2021-01-11 | End: 2021-01-13

## 2021-01-11 RX ORDER — ACETAMINOPHEN 500 MG
1000 TABLET ORAL ONCE
Refills: 0 | Status: COMPLETED | OUTPATIENT
Start: 2021-01-11 | End: 2021-01-11

## 2021-01-11 RX ORDER — OMEPRAZOLE 10 MG/1
1 CAPSULE, DELAYED RELEASE ORAL
Qty: 0 | Refills: 0 | DISCHARGE

## 2021-01-11 RX ADMIN — Medication 975 MILLIGRAM(S): at 22:22

## 2021-01-11 RX ADMIN — SODIUM CHLORIDE 75 MILLILITER(S): 9 INJECTION, SOLUTION INTRAVENOUS at 13:11

## 2021-01-11 RX ADMIN — Medication 100 MILLIGRAM(S): at 16:17

## 2021-01-11 RX ADMIN — ONDANSETRON 4 MILLIGRAM(S): 8 TABLET, FILM COATED ORAL at 17:47

## 2021-01-11 RX ADMIN — Medication 2: at 17:52

## 2021-01-11 RX ADMIN — HYDROMORPHONE HYDROCHLORIDE 0.5 MILLIGRAM(S): 2 INJECTION INTRAMUSCULAR; INTRAVENOUS; SUBCUTANEOUS at 13:07

## 2021-01-11 RX ADMIN — Medication 100 MILLIGRAM(S): at 23:07

## 2021-01-11 RX ADMIN — Medication 400 MILLIGRAM(S): at 12:28

## 2021-01-11 RX ADMIN — HYDROMORPHONE HYDROCHLORIDE 1 MILLIGRAM(S): 2 INJECTION INTRAMUSCULAR; INTRAVENOUS; SUBCUTANEOUS at 17:09

## 2021-01-11 RX ADMIN — SIMVASTATIN 5 MILLIGRAM(S): 20 TABLET, FILM COATED ORAL at 23:07

## 2021-01-11 RX ADMIN — HYDROMORPHONE HYDROCHLORIDE 0.5 MILLIGRAM(S): 2 INJECTION INTRAMUSCULAR; INTRAVENOUS; SUBCUTANEOUS at 12:50

## 2021-01-11 NOTE — ASU PATIENT PROFILE, ADULT - PSH
S/P breast lumpectomy  (Benign) right breast 2000  S/P exploratory laparotomy  with DANILE and segmental SB resection, 3/2019  S/P exploratory laparotomy  s/p tummy tuck for post op bleeding 2014  S/P hernia repair  "Temporary ventral hernia surgery" (1/5/15), Incisional hernia repair (3/2015)  S/P total hysterectomy with removal of both tubes and ovaries  March 2013  Status post abdominoplasty  with hernia repair April 2014  Status post cholecystectomy  01/05/2015

## 2021-01-11 NOTE — BRIEF OPERATIVE NOTE - NSICDXBRIEFPROCEDURE_GEN_ALL_CORE_FT
PROCEDURES:  Repair of hernia using component separation technique with myofascial advancement flap 11-Jan-2021 12:41:42  Caroline Altman  Open repair of incisional hernia using component separation technique 11-Jan-2021 12:40:24  Caroline Altman  Repair of recurrent incisional hernia 11-Jan-2021 12:40:05  Caroline Altman

## 2021-01-11 NOTE — ASU PATIENT PROFILE, ADULT - PMH
Chronic GERD    Hyperlipidemia    Hypertension    Incisional hernia without obstruction or gangrene    Obesity    KATIUSKA (obstructive sleep apnea)  Pt does not use CPAP at night  Type 2 diabetes mellitus

## 2021-01-11 NOTE — BRIEF OPERATIVE NOTE - OPERATION/FINDINGS
repair of recurrent incisional hernia with multiple defects.  Closure using mesh and component separation and advancement of flaps.

## 2021-01-11 NOTE — ASU PREOP CHECKLIST - INTERNAL PROSTHESES
Subjective:  HPI                    Objective:  System    Physical Exam    General     ROS    Assessment/Plan:    PROGRESS  REPORT    Progress reporting period is from 3/20/19 to 4/26/19. 3 visits     SUBJECTIVE   Overall improved pain and function. Pain with abduction position yet at roughly 90 degrees. Cannot sleep on R side at night.      Current Pain level: 2/10   Initial Pain level: 8/10   Changes in function: Yes, see goal flow sheet for change in function   Adverse reactions: None;   ,         OBJECTIVE  Objective: Abduction painful arc, ROM 90 IR, 90 ER, overhead flexion 170.     Weak and painful ER and abduction, lessened under traction.     Cross body stretching still hurts anterior shoulder, therefore DC. Emphasis to prone scap strength, RTC work ligt weight, high reps pain free 3-4x/week.     Follow up planned in 3 weeks. SPADI at that point.       ASSESSMENT/PLAN  Updated problem list and treatment plan: Diagnosis 1:  R shoulder impingement       Assessment of Progress: The patient's condition is improving.  The patient's condition has potential to improve.    Recommendations:  This patient would benefit from continued therapy.     Frequency:  1 X week, every 2-3 weeks once daily  Duration:  for 6 weeks        Please refer to the daily flowsheet for treatment today, total treatment time and time spent performing 1:1 timed codes.     no

## 2021-01-12 ENCOUNTER — TRANSCRIPTION ENCOUNTER (OUTPATIENT)
Age: 66
End: 2021-01-12

## 2021-01-12 LAB
ANION GAP SERPL CALC-SCNC: 5 MMOL/L — SIGNIFICANT CHANGE UP (ref 5–17)
ANION GAP SERPL CALC-SCNC: 6 MMOL/L — SIGNIFICANT CHANGE UP (ref 5–17)
BUN SERPL-MCNC: 11 MG/DL — SIGNIFICANT CHANGE UP (ref 7–23)
BUN SERPL-MCNC: 15 MG/DL — SIGNIFICANT CHANGE UP (ref 7–23)
CALCIUM SERPL-MCNC: 8.3 MG/DL — LOW (ref 8.5–10.1)
CALCIUM SERPL-MCNC: 8.7 MG/DL — SIGNIFICANT CHANGE UP (ref 8.5–10.1)
CHLORIDE SERPL-SCNC: 104 MMOL/L — SIGNIFICANT CHANGE UP (ref 96–108)
CHLORIDE SERPL-SCNC: 105 MMOL/L — SIGNIFICANT CHANGE UP (ref 96–108)
CO2 SERPL-SCNC: 28 MMOL/L — SIGNIFICANT CHANGE UP (ref 22–31)
CO2 SERPL-SCNC: 29 MMOL/L — SIGNIFICANT CHANGE UP (ref 22–31)
CREAT SERPL-MCNC: 0.84 MG/DL — SIGNIFICANT CHANGE UP (ref 0.5–1.3)
CREAT SERPL-MCNC: 0.87 MG/DL — SIGNIFICANT CHANGE UP (ref 0.5–1.3)
GLUCOSE SERPL-MCNC: 180 MG/DL — HIGH (ref 70–99)
GLUCOSE SERPL-MCNC: 182 MG/DL — HIGH (ref 70–99)
HCT VFR BLD CALC: 34.9 % — SIGNIFICANT CHANGE UP (ref 34.5–45)
HGB BLD-MCNC: 11.4 G/DL — LOW (ref 11.5–15.5)
MAGNESIUM SERPL-MCNC: 2.1 MG/DL — SIGNIFICANT CHANGE UP (ref 1.6–2.6)
MCHC RBC-ENTMCNC: 29.8 PG — SIGNIFICANT CHANGE UP (ref 27–34)
MCHC RBC-ENTMCNC: 32.7 GM/DL — SIGNIFICANT CHANGE UP (ref 32–36)
MCV RBC AUTO: 91.1 FL — SIGNIFICANT CHANGE UP (ref 80–100)
NRBC # BLD: 0 /100 WBCS — SIGNIFICANT CHANGE UP (ref 0–0)
PHOSPHATE SERPL-MCNC: 2.2 MG/DL — LOW (ref 2.5–4.5)
PLATELET # BLD AUTO: 339 K/UL — SIGNIFICANT CHANGE UP (ref 150–400)
POTASSIUM SERPL-MCNC: 4 MMOL/L — SIGNIFICANT CHANGE UP (ref 3.5–5.3)
POTASSIUM SERPL-MCNC: 4.1 MMOL/L — SIGNIFICANT CHANGE UP (ref 3.5–5.3)
POTASSIUM SERPL-SCNC: 4 MMOL/L — SIGNIFICANT CHANGE UP (ref 3.5–5.3)
POTASSIUM SERPL-SCNC: 4.1 MMOL/L — SIGNIFICANT CHANGE UP (ref 3.5–5.3)
RBC # BLD: 3.83 M/UL — SIGNIFICANT CHANGE UP (ref 3.8–5.2)
RBC # FLD: 13.4 % — SIGNIFICANT CHANGE UP (ref 10.3–14.5)
SODIUM SERPL-SCNC: 138 MMOL/L — SIGNIFICANT CHANGE UP (ref 135–145)
SODIUM SERPL-SCNC: 139 MMOL/L — SIGNIFICANT CHANGE UP (ref 135–145)
WBC # BLD: 11.53 K/UL — HIGH (ref 3.8–10.5)
WBC # FLD AUTO: 11.53 K/UL — HIGH (ref 3.8–10.5)

## 2021-01-12 PROCEDURE — 99024 POSTOP FOLLOW-UP VISIT: CPT

## 2021-01-12 PROCEDURE — 93010 ELECTROCARDIOGRAM REPORT: CPT

## 2021-01-12 RX ORDER — LACTOBACILLUS ACIDOPHILUS 100MM CELL
1 CAPSULE ORAL
Refills: 0 | Status: DISCONTINUED | OUTPATIENT
Start: 2021-01-12 | End: 2021-01-13

## 2021-01-12 RX ORDER — SODIUM,POTASSIUM PHOSPHATES 278-250MG
1 POWDER IN PACKET (EA) ORAL DAILY
Refills: 0 | Status: DISCONTINUED | OUTPATIENT
Start: 2021-01-12 | End: 2021-01-13

## 2021-01-12 RX ADMIN — TRAMADOL HYDROCHLORIDE 50 MILLIGRAM(S): 50 TABLET ORAL at 15:47

## 2021-01-12 RX ADMIN — TRAMADOL HYDROCHLORIDE 50 MILLIGRAM(S): 50 TABLET ORAL at 21:57

## 2021-01-12 RX ADMIN — Medication 100 MILLIGRAM(S): at 08:14

## 2021-01-12 RX ADMIN — TRAMADOL HYDROCHLORIDE 50 MILLIGRAM(S): 50 TABLET ORAL at 03:26

## 2021-01-12 RX ADMIN — Medication 2: at 11:40

## 2021-01-12 RX ADMIN — Medication 975 MILLIGRAM(S): at 23:29

## 2021-01-12 RX ADMIN — Medication 1 TABLET(S): at 18:37

## 2021-01-12 RX ADMIN — PANTOPRAZOLE SODIUM 20 MILLIGRAM(S): 20 TABLET, DELAYED RELEASE ORAL at 05:04

## 2021-01-12 RX ADMIN — Medication 100 MILLIGRAM(S): at 23:29

## 2021-01-12 RX ADMIN — Medication 100 MILLIGRAM(S): at 18:37

## 2021-01-12 RX ADMIN — Medication 1: at 17:06

## 2021-01-12 RX ADMIN — ENOXAPARIN SODIUM 40 MILLIGRAM(S): 100 INJECTION SUBCUTANEOUS at 11:42

## 2021-01-12 RX ADMIN — Medication 37.5 MILLIGRAM(S): at 11:41

## 2021-01-12 RX ADMIN — Medication 1: at 08:13

## 2021-01-12 RX ADMIN — Medication 975 MILLIGRAM(S): at 17:12

## 2021-01-12 RX ADMIN — Medication 1 PACKET(S): at 23:03

## 2021-01-12 RX ADMIN — SIMVASTATIN 5 MILLIGRAM(S): 20 TABLET, FILM COATED ORAL at 21:57

## 2021-01-12 RX ADMIN — TRAMADOL HYDROCHLORIDE 50 MILLIGRAM(S): 50 TABLET ORAL at 09:33

## 2021-01-12 RX ADMIN — Medication 975 MILLIGRAM(S): at 11:42

## 2021-01-12 RX ADMIN — Medication 975 MILLIGRAM(S): at 05:03

## 2021-01-12 NOTE — PROGRESS NOTE ADULT - ATTENDING COMMENTS
I have personally seen, examined, and participated in the care of this patient. I have reviewed all pertinent clinical information, including history, physical exam, plan, and the PA's note and agree except as noted.    Pt with incisional pain, consistent with what would be expected post operatively.  Tolerable.  Responding well to prescribed analgesics.  Thinks she'd be comfortable enough to go home this afternoon.  Tolerating clear liquid diet.  Denies nausea or vomiting.  Cole removed this morning and voiding on her own.  Jerry drains serosanguinous.  Will educate on Drain care and arrange VNS to assist at home.  Dressing clean and dry.  Abdominal binder replaced.    Postoperative instructions have been provided including avoidance of heavy lifting and strenuous activities in excess of 20-25 pounds for duration of 4-6 weeks. The patient may shower keeping the incisions clean, dry, covered as needed.    f/u with me in one week.

## 2021-01-12 NOTE — DISCHARGE NOTE PROVIDER - PROVIDER TOKENS
PROVIDER:[TOKEN:[[id number]]]
H/O bilateral breast implants    H/O bilateral oophorectomy    H/O blepharoplasty    H/O mastectomy  bilateral

## 2021-01-12 NOTE — DISCHARGE NOTE PROVIDER - NSDCMRMEDTOKEN_GEN_ALL_CORE_FT
aspirin 81 mg oral tablet: 1 tab(s) orally once a day  Jentadueto 2.5 mg-1000 mg oral tablet: 1 tab(s) orally 2 times a day  multivitamin:   once a day  omeprazole 10 mg oral delayed release capsule: 1 cap(s) orally once a day  simvastatin 10 mg oral tablet: 0.5 tab(s) orally once a day (at bedtime)  venlafaxine 37.5 mg oral capsule, extended release: 1 cap(s) orally once a day   aspirin 81 mg oral tablet: 1 tab(s) orally once a day  Jentadueto 2.5 mg-1000 mg oral tablet: 1 tab(s) orally 2 times a day  multivitamin:   once a day  omeprazole 10 mg oral delayed release capsule: 1 cap(s) orally once a day  traMADol 50 mg oral tablet: 1 tab(s) orally every 6 hours, As Needed -for severe pain MDD:4   Tylenol 325 mg oral tablet: 2 tab(s) orally every 4 hours, As Needed  venlafaxine 37.5 mg oral capsule, extended release: 1 cap(s) orally once a day

## 2021-01-12 NOTE — CHART NOTE - NSCHARTNOTEFT_GEN_A_CORE
Called by RN for notification, and to re-evaluate patient - telemetry reports 4 beats of v-tach at rate of 188 bpm at 1830 hours.  Patient seen and examined in bedside chair.  Reports that at 1830 hours, she had an exacerbation of incisional pain, and became somewhat diaphoretic - otherwise denied experiencing any CP/SOB/palpitations/dizziness/lightheadedness/N/V.  Currently, reports improvement in abdominal incision pain and is sitting up comfortably in the chair in good spirits.    Physical Exam:  Lungs: CTA bilat without W/R/R  Cardiac: S1S2, RRR at rate of 88  Abd: soft, ND.  Dressings C/D/I, binder intact.  +BS x 4.  Calves: Soft, NT, without edema bilat    A: Pain exacerbation    P: EKG ordered (reviewed with ICU PA) - no arrhythmias/anomalies appreciated     Stat lytes ordered, show mild hypophosphatemia (repleted)     Repeat bmp/mg/phos in am     Continue d/c planning     D/W Dr. Ratliff     Will follow

## 2021-01-12 NOTE — DISCHARGE NOTE PROVIDER - CARE PROVIDER_API CALL
Brett Ratliff)  Surgery  [address] [place] [address]  Phone: [phone]  Fax: [phone]  Follow Up Time:

## 2021-01-12 NOTE — DISCHARGE NOTE PROVIDER - HOSPITAL COURSE
66 yo female with pmhx of obesity, HLD, HTN, DM, KATIUSKA, GERD, here for elective repair of recurrent incisional hernia with component separation.    Pt presented to OR holding for procedure on 1/11/2021.  Pt tolerated the procedure well.  Due to extensiveness of surgery, patient was admitted.  Pt did well overnight.  Pt had almazan removed on POD 1, and tolerated clears.  Was able to void. Pt ambulated.  Diet advanced to a full  liquid diet.  Once tolerated was advanced to a regular diet.     Pt did well during her hospital stay.  Ambulating, voiding, passing flatus, having bowel movements.  Her pain was well controlled during her stay.  Pt is stable and ready for discharge.  Will follow up outpatient with Dr. Ratliff.

## 2021-01-12 NOTE — DISCHARGE NOTE PROVIDER - NSDCFUADDINST_GEN_ALL_CORE_FT
No reaching, pulling, pushing, lifting   Continue to wear abdominal binder  Continue to keep head of bed flexed  Regular walking, deep breathing/incentive spirometer

## 2021-01-12 NOTE — ANESTHESIA FOLLOW-UP NOTE - NSEVALATIONFT_GEN_ALL_CORE
patient not seen secondary to COVID 19, all anesthesia complaints or concerns please call anesthesia office at [phone] or page anesthesiologist on call

## 2021-01-12 NOTE — DISCHARGE NOTE PROVIDER - NSDCFUADDAPPT_GEN_ALL_CORE_FT
7-10 days, please call for appointment  Please scheduled appointment with cardiologist for follow up.

## 2021-01-12 NOTE — DISCHARGE NOTE PROVIDER - NSDCCPTREATMENT_GEN_ALL_CORE_FT
PRINCIPAL PROCEDURE  Procedure: Repair of hernia using component separation technique with myofascial advancement flap  Findings and Treatment:

## 2021-01-13 ENCOUNTER — TRANSCRIPTION ENCOUNTER (OUTPATIENT)
Age: 66
End: 2021-01-13

## 2021-01-13 VITALS
HEART RATE: 92 BPM | OXYGEN SATURATION: 91 % | DIASTOLIC BLOOD PRESSURE: 80 MMHG | SYSTOLIC BLOOD PRESSURE: 120 MMHG | TEMPERATURE: 98 F | RESPIRATION RATE: 20 BRPM

## 2021-01-13 LAB
ANION GAP SERPL CALC-SCNC: 4 MMOL/L — LOW (ref 5–17)
BUN SERPL-MCNC: 9 MG/DL — SIGNIFICANT CHANGE UP (ref 7–23)
CALCIUM SERPL-MCNC: 8.4 MG/DL — LOW (ref 8.5–10.1)
CHLORIDE SERPL-SCNC: 106 MMOL/L — SIGNIFICANT CHANGE UP (ref 96–108)
CO2 SERPL-SCNC: 28 MMOL/L — SIGNIFICANT CHANGE UP (ref 22–31)
CREAT SERPL-MCNC: 0.65 MG/DL — SIGNIFICANT CHANGE UP (ref 0.5–1.3)
GLUCOSE SERPL-MCNC: 187 MG/DL — HIGH (ref 70–99)
PHOSPHATE SERPL-MCNC: 2.1 MG/DL — LOW (ref 2.5–4.5)
POTASSIUM SERPL-MCNC: 3.8 MMOL/L — SIGNIFICANT CHANGE UP (ref 3.5–5.3)
POTASSIUM SERPL-SCNC: 3.8 MMOL/L — SIGNIFICANT CHANGE UP (ref 3.5–5.3)
SODIUM SERPL-SCNC: 138 MMOL/L — SIGNIFICANT CHANGE UP (ref 135–145)

## 2021-01-13 PROCEDURE — 88302 TISSUE EXAM BY PATHOLOGIST: CPT

## 2021-01-13 PROCEDURE — 83735 ASSAY OF MAGNESIUM: CPT

## 2021-01-13 PROCEDURE — 99024 POSTOP FOLLOW-UP VISIT: CPT

## 2021-01-13 PROCEDURE — C1781: CPT

## 2021-01-13 PROCEDURE — 83036 HEMOGLOBIN GLYCOSYLATED A1C: CPT

## 2021-01-13 PROCEDURE — 88304 TISSUE EXAM BY PATHOLOGIST: CPT

## 2021-01-13 PROCEDURE — 80048 BASIC METABOLIC PNL TOTAL CA: CPT

## 2021-01-13 PROCEDURE — 36415 COLL VENOUS BLD VENIPUNCTURE: CPT

## 2021-01-13 PROCEDURE — 93005 ELECTROCARDIOGRAM TRACING: CPT

## 2021-01-13 PROCEDURE — C1889: CPT

## 2021-01-13 PROCEDURE — 85027 COMPLETE CBC AUTOMATED: CPT

## 2021-01-13 PROCEDURE — 82962 GLUCOSE BLOOD TEST: CPT

## 2021-01-13 PROCEDURE — 99223 1ST HOSP IP/OBS HIGH 75: CPT

## 2021-01-13 PROCEDURE — 84100 ASSAY OF PHOSPHORUS: CPT

## 2021-01-13 RX ORDER — ACETAMINOPHEN 500 MG
2 TABLET ORAL
Qty: 0 | Refills: 0 | DISCHARGE

## 2021-01-13 RX ORDER — SIMVASTATIN 20 MG/1
0.5 TABLET, FILM COATED ORAL
Qty: 0 | Refills: 0 | DISCHARGE

## 2021-01-13 RX ORDER — TRAMADOL HYDROCHLORIDE 50 MG/1
1 TABLET ORAL
Qty: 8 | Refills: 0
Start: 2021-01-13

## 2021-01-13 RX ADMIN — Medication 975 MILLIGRAM(S): at 04:05

## 2021-01-13 RX ADMIN — Medication 1: at 08:02

## 2021-01-13 RX ADMIN — Medication 1 PACKET(S): at 11:59

## 2021-01-13 RX ADMIN — Medication 1 TABLET(S): at 09:23

## 2021-01-13 RX ADMIN — Medication 2: at 11:48

## 2021-01-13 RX ADMIN — Medication 100 MILLIGRAM(S): at 09:19

## 2021-01-13 RX ADMIN — TRAMADOL HYDROCHLORIDE 50 MILLIGRAM(S): 50 TABLET ORAL at 09:19

## 2021-01-13 RX ADMIN — PANTOPRAZOLE SODIUM 20 MILLIGRAM(S): 20 TABLET, DELAYED RELEASE ORAL at 04:06

## 2021-01-13 RX ADMIN — Medication 37.5 MILLIGRAM(S): at 11:59

## 2021-01-13 RX ADMIN — Medication 975 MILLIGRAM(S): at 11:59

## 2021-01-13 RX ADMIN — ENOXAPARIN SODIUM 40 MILLIGRAM(S): 100 INJECTION SUBCUTANEOUS at 11:58

## 2021-01-13 NOTE — PROGRESS NOTE ADULT - SUBJECTIVE AND OBJECTIVE BOX
Post Operative Note  Patient: IKER DOMÍNGUEZ 65y (1955) Female   MRN: [id number]  Location: [address] D1  Visit: 01-11-21 Inpatient  Date: 01-11-21 @ 22:14    Procedure: S/P repair of recurrent incisional hernia with component separation and advancement of flaps    Subjective:   Patient seen and examined at bedside.  No events post-operatively.  Patient with no new complaints at this time besides mild post operative pain, tolerating clear diet, denies flatus and bowel movement. +Cole.  Patient denies any fevers, chills, chest pain, shortness of breath,  nausea, vomiting or diarrhea.    Objective:  Vitals: T(F): 97.3 (01-11-21 @ 21:00), Max: 98.8 (01-11-21 @ 12:23)  HR: 76 (01-11-21 @ 21:15)  BP: 139/69 (01-11-21 @ 21:15) (91/54 - 155/71)  RR: 15 (01-11-21 @ 21:15)  SpO2: 97% (01-11-21 @ 21:15)  Vent Settings:     In:   01-11-21 @ 07:01  -  01-11-21 @ 22:14  --------------------------------------------------------  IN: 275 mL      IV Fluids: dextrose 5%. 1000 milliLiter(s) (50 mL/Hr) IV Continuous <Continuous>  dextrose 5%. 1000 milliLiter(s) (100 mL/Hr) IV Continuous <Continuous>      Out:   01-11-21 @ 07:01  -  01-11-21 @ 22:14  --------------------------------------------------------  OUT: 480 mL      EBL:     Voided Urine:   01-11-21 @ 07:01  -  01-11-21 @ 22:14  --------------------------------------------------------  OUT: 480 mL      Cole Catheter: yes no   Drains:   KIRAN:   01-11-21 @ 07:01  -  01-11-21 @ 22:14  --------------------------------------------------------  OUT: 130 mL    PHYSICAL EXAM:  GENERAL: No acute distress, well-developed  HEAD:  Atraumatic, Normocephalic  ABDOMEN: Soft, appropriately-tender, minimally-distended; +abd binder, JPs with small amount of sanguinous drainage  NEUROLOGY: A&O x 3, no focal deficits    Medications: [Standing]  acetaminophen   Tablet .. 975 milliGRAM(s) Oral every 6 hours  BUpivacaine liposome 1.3% Injectable (no eMAR) 20 milliLiter(s) Local Injection once  ceFAZolin   IVPB 2000 milliGRAM(s) IV Intermittent every 8 hours  dextrose 40% Gel 15 Gram(s) Oral once  dextrose 5%. 1000 milliLiter(s) IV Continuous <Continuous>  dextrose 5%. 1000 milliLiter(s) IV Continuous <Continuous>  dextrose 50% Injectable 25 Gram(s) IV Push once  dextrose 50% Injectable 12.5 Gram(s) IV Push once  dextrose 50% Injectable 25 Gram(s) IV Push once  glucagon  Injectable 1 milliGRAM(s) IntraMuscular once  insulin lispro (ADMELOG) corrective regimen sliding scale   SubCutaneous three times a day before meals  ketorolac   Injectable 15 milliGRAM(s) IV Push every 6 hours PRN  pantoprazole    Tablet 20 milliGRAM(s) Oral before breakfast  simvastatin 5 milliGRAM(s) Oral at bedtime  traMADol 50 milliGRAM(s) Oral every 6 hours PRN  venlafaxine XR. 37.5 milliGRAM(s) Oral daily    Medications: [PRN]  acetaminophen   Tablet .. 975 milliGRAM(s) Oral every 6 hours  BUpivacaine liposome 1.3% Injectable (no eMAR) 20 milliLiter(s) Local Injection once  ceFAZolin   IVPB 2000 milliGRAM(s) IV Intermittent every 8 hours  dextrose 40% Gel 15 Gram(s) Oral once  dextrose 5%. 1000 milliLiter(s) IV Continuous <Continuous>  dextrose 5%. 1000 milliLiter(s) IV Continuous <Continuous>  dextrose 50% Injectable 25 Gram(s) IV Push once  dextrose 50% Injectable 12.5 Gram(s) IV Push once  dextrose 50% Injectable 25 Gram(s) IV Push once  glucagon  Injectable 1 milliGRAM(s) IntraMuscular once  insulin lispro (ADMELOG) corrective regimen sliding scale   SubCutaneous three times a day before meals  ketorolac   Injectable 15 milliGRAM(s) IV Push every 6 hours PRN  pantoprazole    Tablet 20 milliGRAM(s) Oral before breakfast  simvastatin 5 milliGRAM(s) Oral at bedtime  traMADol 50 milliGRAM(s) Oral every 6 hours PRN  venlafaxine XR. 37.5 milliGRAM(s) Oral daily    Labs:    Imaging:  No post-op imaging studies    Assessment:  65yFemale patient S/P repair of recurrent incisional hernia with component separation and advancement of flaps    Plan:  - incentive spirometer  - pain control  - OOB  - CLD  - serial abdominal exams  - labs in am    Surgical Team Contact Information  Spectralink: Ext: [phone] or [phone]  Pager: [phone]    Date/Time: 01-11-21 @ 22:14  
POD # 1  s/p repair recurrent incisional hernia with component separation and advancement of flaps    SUBJECTIVE:  66 y/o F seen and examined at bedside with no acute overnight events. PT offers no complaints at this time in NAD. PT tolerating CLD without N/V. PT with left and right KALEN drain in place with serosanguinous output. Left KALEN with 75 cc/24 hrs and right kalen with 90cc/24 hrs. PT denies any fevers, chills, chest pain, shortness of breath, abdominal pain, nausea, vomiting or diarrhea.    Vital Signs Last 24 Hrs  T(C): 37.2 (12 Jan 2021 05:05), Max: 37.2 (12 Jan 2021 05:05)  T(F): 98.9 (12 Jan 2021 05:05), Max: 98.9 (12 Jan 2021 05:05)  HR: 87 (12 Jan 2021 05:05) (73 - 106)  BP: 121/79 (12 Jan 2021 05:05) (91/54 - 155/71)  BP(mean): --  RR: 18 (12 Jan 2021 05:05) (12 - 18)  SpO2: 91% (12 Jan 2021 05:05) (91% - 99%)    PHYSICAL EXAM:  GENERAL: No acute distress, well-developed  CHEST/LUNG: CTABL, no ronchi, rales or wheezing  HEART: RRR, no MRGs  ABDOMEN: Obese, protuberant, soft, mild incisional ttp, nondistended, +bs, left and right KALEN drain in place, dressings c/d/i, abdominal binder replaced  NEUROLOGY: A&O x 3, no focal deficits    I&O's Summary    11 Jan 2021 07:01  -  12 Jan 2021 07:00  --------------------------------------------------------  IN: 275 mL / OUT: 965 mL / NET: -690 mL      I&O's Detail    11 Jan 2021 07:01  -  12 Jan 2021 07:00  --------------------------------------------------------  IN:    Lactated Ringers: 275 mL  Total IN: 275 mL    OUT:    Drain (mL): 75 mL    Drain (mL): 90 mL    Indwelling Catheter - Urethral (mL): 800 mL    Voided (mL): 0 mL  Total OUT: 965 mL    Total NET: -690 mL        MEDICATIONS  (STANDING):  acetaminophen   Tablet .. 975 milliGRAM(s) Oral every 6 hours  BUpivacaine liposome 1.3% Injectable (no eMAR) 20 milliLiter(s) Local Injection once  ceFAZolin   IVPB 2000 milliGRAM(s) IV Intermittent every 8 hours  dextrose 40% Gel 15 Gram(s) Oral once  dextrose 5%. 1000 milliLiter(s) (50 mL/Hr) IV Continuous <Continuous>  dextrose 5%. 1000 milliLiter(s) (100 mL/Hr) IV Continuous <Continuous>  dextrose 50% Injectable 25 Gram(s) IV Push once  dextrose 50% Injectable 12.5 Gram(s) IV Push once  dextrose 50% Injectable 25 Gram(s) IV Push once  enoxaparin Injectable 40 milliGRAM(s) SubCutaneous daily  glucagon  Injectable 1 milliGRAM(s) IntraMuscular once  insulin lispro (ADMELOG) corrective regimen sliding scale   SubCutaneous three times a day before meals  pantoprazole    Tablet 20 milliGRAM(s) Oral before breakfast  simvastatin 5 milliGRAM(s) Oral at bedtime  venlafaxine XR. 37.5 milliGRAM(s) Oral daily    MEDICATIONS  (PRN):  ketorolac   Injectable 15 milliGRAM(s) IV Push every 6 hours PRN Moderate Pain (4 - 6)  traMADol 50 milliGRAM(s) Oral every 6 hours PRN Severe Pain (7 - 10)    LABS:                        11.4   11.53 )-----------( 339      ( 12 Jan 2021 07:39 )             34.9     01-12    139  |  105  |  15  ----------------------------<  182<H>  4.1   |  28  |  0.87    Ca    8.3<L>      12 Jan 2021 07:39        ASSESSMENT  66 y/o F POD # 1 s/p repair recurrent incisional hernia with component separation and advancement of flaps, tolerating CLD, with left and right KALEN drains in place with 75 cc and 90 cc /24 hrs respectively,     PLAN  - d/c almazan catheter, f/u trial of void  - tolerating cld adv to fulls  - trend Kalen Drain outputs  - pain control, supportive care  - OOB, ambulation with assistance as tolerated  - serial abdominal exams  - d/c planning  - to be discussed with Dr. Ratliff    Surgical Team Contact Information  Spectralink: Ext: [phone] or [phone]  Pager: [phone]
POD # 2    SUBJECTIVE:  66 y/o F seen and examined at bedside. pt with 4 beats of vtach overnight since resolved with stat ekg without abnormalities, stat elytes with hypophosphotemia, repleted. At this time pt offers no complaints at this time in NAD. Right KIRAN noted with 70cc/24 hrs and left KIRAN with 215cc/24hrs.  Pt tolerating reg diet without N/V.  Pt denies any fevers, chills, chest pain, shortness of breath, abdominal pain, nausea, vomiting or diarrhea.    Vital Signs Last 24 Hrs  T(C): 36.7 (13 Jan 2021 05:09), Max: 36.7 (12 Jan 2021 12:49)  T(F): 98 (13 Jan 2021 05:09), Max: 98 (12 Jan 2021 12:49)  HR: 83 (13 Jan 2021 05:09) (61 - 88)  BP: 128/84 (13 Jan 2021 05:09) (119/75 - 128/84)  BP(mean): --  RR: 18 (13 Jan 2021 05:09) (18 - 20)  SpO2: 91% (13 Jan 2021 05:09) (91% - 98%)    PHYSICAL EXAM:  GENERAL: No acute distress, well-developed  ABDOMEN: Protuberant, obese, soft, mild incisional ttp, nondistended, dressings c/d/i, KIRAN drains in place with serosanguinous outputs  NEUROLOGY: A&O x 3, no focal deficits    I&O's Summary    12 Jan 2021 07:01  -  13 Jan 2021 07:00  --------------------------------------------------------  IN: 0 mL / OUT: 675 mL / NET: -675 mL      I&O's Detail    12 Jan 2021 07:01  -  13 Jan 2021 07:00  --------------------------------------------------------  IN:  Total IN: 0 mL    OUT:    Drain (mL): 205 mL    Drain (mL): 70 mL    Voided (mL): 400 mL  Total OUT: 675 mL    Total NET: -675 mL        MEDICATIONS  (STANDING):  acetaminophen   Tablet .. 975 milliGRAM(s) Oral every 6 hours  BUpivacaine liposome 1.3% Injectable (no eMAR) 20 milliLiter(s) Local Injection once  ceFAZolin   IVPB 2000 milliGRAM(s) IV Intermittent every 8 hours  dextrose 40% Gel 15 Gram(s) Oral once  dextrose 5%. 1000 milliLiter(s) (50 mL/Hr) IV Continuous <Continuous>  dextrose 5%. 1000 milliLiter(s) (100 mL/Hr) IV Continuous <Continuous>  dextrose 50% Injectable 25 Gram(s) IV Push once  dextrose 50% Injectable 12.5 Gram(s) IV Push once  dextrose 50% Injectable 25 Gram(s) IV Push once  enoxaparin Injectable 40 milliGRAM(s) SubCutaneous daily  glucagon  Injectable 1 milliGRAM(s) IntraMuscular once  insulin lispro (ADMELOG) corrective regimen sliding scale   SubCutaneous three times a day before meals  lactobacillus acidophilus 1 Tablet(s) Oral two times a day with meals  pantoprazole    Tablet 20 milliGRAM(s) Oral before breakfast  potassium phosphate / sodium phosphate Powder (PHOS-NaK) 1 Packet(s) Oral daily  simvastatin 5 milliGRAM(s) Oral at bedtime  venlafaxine XR. 37.5 milliGRAM(s) Oral daily    MEDICATIONS  (PRN):  ketorolac   Injectable 15 milliGRAM(s) IV Push every 6 hours PRN Moderate Pain (4 - 6)  traMADol 50 milliGRAM(s) Oral every 6 hours PRN Severe Pain (7 - 10)    LABS:                        11.4   11.53 )-----------( 339      ( 12 Jan 2021 07:39 )             34.9     01-12    138  |  104  |  11  ----------------------------<  180<H>  4.0   |  29  |  0.84    Ca    8.7      12 Jan 2021 21:24  Phos  2.2     01-12  Mg     2.1     01-12        ASSESSMENT  66 y/o POD # 2 s/p repair recurrent incisional hernia with component separation and advancement of flaps, with episode of 4 beats of vtach overnight, with stat ekg without abnormalities, now without complaints tolerating reg diet, KIRAN drains in place with serosanguinous outputs,     PLAN  - poss cardio consult  - d/c planning  - pain control, supportive care  - OOB, ambulation   - serial abdominal exams  - f/u am labs  - day team to discuss with Dr. Ratliff    Surgical Team Contact Information  Spectralink: Ext: [phone] or [phone]  Pager: [phone]

## 2021-01-13 NOTE — CONSULT NOTE ADULT - ASSESSMENT
- Patient is not complaining of any cardiac symptoms at this time.  - No clear evidence of acute ischemia, trops negative x 2. Will follow up third set.    - No acute changes on EKG compared to previous.  - No meaningful evidence of volume overload.  - Previous TTE shows ___.  - BP well controlled, monitor routine hemodynamics.    - Monitor and replete lytes, keep K>4, Mg>2.  - Strict I/Os, daily weights.    - His CKs are flat, suggesting against acute atherosclerotic plaque rupture.  - Biomarker trend is not consistent with plaque rupture but rather demand ischemia. Monitor closely for the development of anginal symptoms or clinical signs of ischemia.     - Other cardiovascular workup will depend on clinical course.  - All other workup per primary team.  - Will continue to follow.     yo F/M with PMH of    Doubt ACS, pain likely pleuritic vs. musculoskeletal.    - No clear evidence of acute ischemia, trops negative x2. Will f/u third set, patient asymptomatic.  - CKs are flat, which suggests against acute atherosclerotic plaque rupture.  - No evidence of volume overload.  - No acute changes on EKG compared to previous.  - Previous echo in 02/17 shows normal LV function with EF: __%, no significant valvular disease noted.  - BP well controlled, continue home BP meds, monitor routine hemodynamics.  - Monitor and replete lytes, keep K>4, Mg>2.  - Patient has no active ischemia, decompensated HF, unstable arrythmia, or severe stenotic valvular disease, and in the setting of low risk --- procedure, patient is optimized from cardiovascular standpoint to proceed with planned procedure with routine hemodynamic monitoring.   - Patient has no modifiable active cardiac risk factors (elevated trops x3, moderate-severe ASD), and in the setting of low risk Bronchoscopy, patient is optimized as best as possible from cardiovascular standpoint to proceed with planned procedure with routine hemodynamic monitoring.   - Other cardiovascular workup will depend on clinical course. All other workup per primary team.  - Will follow.         Patient is a 65 year old female, POD day 2 s/p recurrent incisional hernia repair. She reports that she developed severe, 10/10 pain at the incisional site last night beginning around 6:30 pm and lasting about 3 hours.  Patient was noted to have 4 beats of SVT during the acute episode of pain.    # S/P Incisional hernia Repair:    POD # 2  - Patient had a short run of non sustained 4 beats SVT earlier this morning.  - Patient remained asymptomatic during the event.   - Patient is not complaining of any cardiac symptoms at this time.  - EKG Shows: NSR with no acute changes  - Patient states that she had stress test and angiography done in 2018 which were normal  - No history of cardiac disease.   - isolated event non significant, like 2/2 to pain due to surgery  - patient is clinically stable and can be discharged.  - Further cardiovascular workup will depend on clinical course.

## 2021-01-13 NOTE — CONSULT NOTE ADULT - SUBJECTIVE AND OBJECTIVE BOX
[facility]         Megan Ford, [place], [place], Mandy, Soto Jackson        [phone] (office)    Reason for Consult: 4 beats of V.Tach         HPI: 64 y/o F seen and examined at bedside with no acute overnight events. PT offers no complaints at this time in NAD. PT tolerating CLD without N/V. PT with left and right KALEN drain in place with serosanguinous output. Left KALEN with 75 cc/24 hrs and right kalen with 90cc/24 hrs. PT denies any fevers, chills, chest pain, shortness of breath, abdominal pain, nausea, vomiting or diarrhea.      PAST MEDICAL & SURGICAL HISTORY:  Type 2 diabetes mellitus    Incisional hernia without obstruction or gangrene    KATIUSKA (obstructive sleep apnea)  Pt does not use CPAP at night    Chronic GERD    Hyperlipidemia    Obesity    Hypertension    S/P exploratory laparotomy  with DANIEL and segmental SB resection, 3/2019    S/P hernia repair  &quot;Temporary ventral hernia surgery&quot; (1/5/15), Incisional hernia repair (3/2015)    Status post cholecystectomy  2015    S/P exploratory laparotomy  s/p tummy tuck for post op bleeding     S/P breast lumpectomy  (Benign) right breast     Status post abdominoplasty  with hernia repair 2014    S/P total hysterectomy with removal of both tubes and ovaries  2013        SOCIAL HISTORY: No active tobacco, alcohol or illicit drug use    FAMILY HISTORY:  FH: cirrhosis  (Brother )    Family history of MS (multiple sclerosis)  (Father )    FH: CAD (coronary artery disease)  (Mother )        Home Medications:  aspirin 81 mg oral tablet: 1 tab(s) orally once a day (2021 06:30)  Jentadueto 2.5 mg-1000 mg oral tablet: 1 tab(s) orally 2 times a day (2021 06:30)  multivitamin:   once a day (2021 06:30)  omeprazole 10 mg oral delayed release capsule: 1 cap(s) orally once a day (2021 06:30)  simvastatin 10 mg oral tablet: 0.5 tab(s) orally once a day (at bedtime) (2021 06:30)  venlafaxine 37.5 mg oral capsule, extended release: 1 cap(s) orally once a day (2021 06:30)      MEDICATIONS  (STANDING):  acetaminophen   Tablet .. 975 milliGRAM(s) Oral every 6 hours  BUpivacaine liposome 1.3% Injectable (no eMAR) 20 milliLiter(s) Local Injection once  ceFAZolin   IVPB 2000 milliGRAM(s) IV Intermittent every 8 hours  dextrose 40% Gel 15 Gram(s) Oral once  dextrose 5%. 1000 milliLiter(s) (50 mL/Hr) IV Continuous <Continuous>  dextrose 5%. 1000 milliLiter(s) (100 mL/Hr) IV Continuous <Continuous>  dextrose 50% Injectable 25 Gram(s) IV Push once  dextrose 50% Injectable 12.5 Gram(s) IV Push once  dextrose 50% Injectable 25 Gram(s) IV Push once  enoxaparin Injectable 40 milliGRAM(s) SubCutaneous daily  glucagon  Injectable 1 milliGRAM(s) IntraMuscular once  insulin lispro (ADMELOG) corrective regimen sliding scale   SubCutaneous three times a day before meals  lactobacillus acidophilus 1 Tablet(s) Oral two times a day with meals  pantoprazole    Tablet 20 milliGRAM(s) Oral before breakfast  potassium phosphate / sodium phosphate Powder (PHOS-NaK) 1 Packet(s) Oral daily  simvastatin 5 milliGRAM(s) Oral at bedtime  venlafaxine XR. 37.5 milliGRAM(s) Oral daily    MEDICATIONS  (PRN):  ketorolac   Injectable 15 milliGRAM(s) IV Push every 6 hours PRN Moderate Pain (4 - 6)  traMADol 50 milliGRAM(s) Oral every 6 hours PRN Severe Pain (7 - 10)      Allergies    Vicodin (Other)    Intolerances    adhesives (Other)      REVIEW OF SYSTEMS: Negative except as per HPI.    VITAL SIGNS:   Vital Signs Last 24 Hrs  T(C): 36.7 (2021 05:09), Max: 36.7 (2021 12:49)  T(F): 98 (2021 05:09), Max: 98 (2021 12:49)  HR: 83 (2021 05:09) (61 - 88)  BP: 128/84 (2021 05:09) (119/75 - 128/84)  BP(mean): --  RR: 18 (2021 05:09) (18 - 20)  SpO2: 91% (2021 05:09) (91% - 98%)    I&O's Summary    2021 07:01  -  2021 07:00  --------------------------------------------------------  IN: 0 mL / OUT: 675 mL / NET: -675 mL        PHYSICAL EXAM:  Constitutional: NAD, well-developed  HEENT NC/AT, moist mucous membranes  Pulmonary: Non-labored, breath sounds are clear bilaterally, no wheezing, rales or rhonchi  Cardiovascular: +S1, S2, RRR, no murmur  Gastrointestinal: Soft, nontender, nondistended, normoactive bowel sounds  Extremities: No peripheral edema   Neurological: Alert, strength and sensitivity are grossly intact  Skin: No obvious lesions/rashes  Psych: Mood & affect appropriate    LABS: All Labs Reviewed:                        11.4   1153 )-----------( 339      ( 2021 07:39 )             34.9     2021 07:16    138    |  106    |  9      ----------------------------<  187    3.8     |  28     |  0.65   2021 21:24    138    |  104    |  11     ----------------------------<  180    4.0     |  29     |  0.84   2021 07:39    139    |  105    |  15     ----------------------------<  182    4.1     |  28     |  0.87     Ca    8.4        2021 07:16  Ca    8.7        2021 21:24  Ca    8.3        2021 07:39  Phos  2.1       2021 07:16  Phos  2.2       2021 21:24  Mg     2.1       2021 21:24            Blood Culture:         EKG:    RADIOLOGY:    CXR:   [facility]         Megan Ford, [place], [place], Mandy, Manuel, Soto        [phone] (office)    Reason for Consult: 4 beats of V.Tach         HPI: Patient is a 65 year old female, POD day 2 s/p recurrent incisional hernia repair. She reports that she developed severe, 10/10 pain at the incisional site last night beginning around 6:30 pm and lasting about 3 hours.  Patient was noted to have 4 beats of v tach during the acute episode of pain. She reports that she was surprised by this, as she had no symptoms of  shortness of breath, chest pain, palpitations, dizziness, or presyncope/syncope. Patient complained only of diaphoresis during the episode. Her pain resolved after being given 3 Tylenol and has not recurred since.     66 y/o F seen and examined at bedside with no acute overnight events. PT offers no complaints at this time in NAD. PT tolerating CLD without N/V. PT with left and right KALEN drain in place with serosanguinous output. Left KALEN with 75 cc/24 hrs and right kalen with 90cc/24 hrs. PT denies any fevers, chills, chest pain, shortness of breath, abdominal pain, nausea, vomiting or diarrhea.      PAST MEDICAL & SURGICAL HISTORY:  Type 2 diabetes mellitus    Incisional hernia without obstruction or gangrene    KATIUSKA (obstructive sleep apnea)  Pt does not use CPAP at night    Chronic GERD    Hyperlipidemia    Obesity    Hypertension    S/P exploratory laparotomy  with DANIEL and segmental SB resection, 3/2019    S/P hernia repair  &quot;Temporary ventral hernia surgery&quot; (1/5/15), Incisional hernia repair (3/2015)    Status post cholecystectomy  2015    S/P exploratory laparotomy  s/p tummy tuck for post op bleeding     S/P breast lumpectomy  (Benign) right breast 2000    Status post abdominoplasty  with hernia repair 2014    S/P total hysterectomy with removal of both tubes and ovaries  2013        SOCIAL HISTORY: No active tobacco, alcohol or illicit drug use    FAMILY HISTORY:  FH: cirrhosis  (Brother )    Family history of MS (multiple sclerosis)  (Father )    FH: CAD (coronary artery disease)  (Mother )        Home Medications:  aspirin 81 mg oral tablet: 1 tab(s) orally once a day (2021 06:30)  Jentadueto 2.5 mg-1000 mg oral tablet: 1 tab(s) orally 2 times a day (2021 06:30)  multivitamin:   once a day (2021 06:30)  omeprazole 10 mg oral delayed release capsule: 1 cap(s) orally once a day (2021 06:30)  simvastatin 10 mg oral tablet: 0.5 tab(s) orally once a day (at bedtime) (2021 06:30)  venlafaxine 37.5 mg oral capsule, extended release: 1 cap(s) orally once a day (2021 06:30)      MEDICATIONS  (STANDING):  acetaminophen   Tablet .. 975 milliGRAM(s) Oral every 6 hours  BUpivacaine liposome 1.3% Injectable (no eMAR) 20 milliLiter(s) Local Injection once  ceFAZolin   IVPB 2000 milliGRAM(s) IV Intermittent every 8 hours  dextrose 40% Gel 15 Gram(s) Oral once  dextrose 5%. 1000 milliLiter(s) (50 mL/Hr) IV Continuous <Continuous>  dextrose 5%. 1000 milliLiter(s) (100 mL/Hr) IV Continuous <Continuous>  dextrose 50% Injectable 25 Gram(s) IV Push once  dextrose 50% Injectable 12.5 Gram(s) IV Push once  dextrose 50% Injectable 25 Gram(s) IV Push once  enoxaparin Injectable 40 milliGRAM(s) SubCutaneous daily  glucagon  Injectable 1 milliGRAM(s) IntraMuscular once  insulin lispro (ADMELOG) corrective regimen sliding scale   SubCutaneous three times a day before meals  lactobacillus acidophilus 1 Tablet(s) Oral two times a day with meals  pantoprazole    Tablet 20 milliGRAM(s) Oral before breakfast  potassium phosphate / sodium phosphate Powder (PHOS-NaK) 1 Packet(s) Oral daily  simvastatin 5 milliGRAM(s) Oral at bedtime  venlafaxine XR. 37.5 milliGRAM(s) Oral daily    MEDICATIONS  (PRN):  ketorolac   Injectable 15 milliGRAM(s) IV Push every 6 hours PRN Moderate Pain (4 - 6)  traMADol 50 milliGRAM(s) Oral every 6 hours PRN Severe Pain (7 - 10)      Allergies    Vicodin (Other)    Intolerances    adhesives (Other)      REVIEW OF SYSTEMS: Negative except as per HPI.    VITAL SIGNS:   Vital Signs Last 24 Hrs  T(C): 36.7 (2021 05:09), Max: 36.7 (2021 12:49)  T(F): 98 (2021 05:09), Max: 98 (2021 12:49)  HR: 83 (2021 05:09) (61 - 88)  BP: 128/84 (2021 05:09) (119/75 - 128/84)  BP(mean): --  RR: 18 (2021 05:09) (18 - 20)  SpO2: 91% (2021 05:09) (91% - 98%)    I&O's Summary    2021 07:01  -  2021 07:00  --------------------------------------------------------  IN: 0 mL / OUT: 675 mL / NET: -675 mL        PHYSICAL EXAM:  Constitutional: NAD, well-developed  HEENT NC/AT, moist mucous membranes  Pulmonary: Non-labored, breath sounds are clear bilaterally, no wheezing, rales or rhonchi  Cardiovascular: +S1, S2, RRR, no murmur  Gastrointestinal: Soft, nontender, nondistended, normoactive bowel sounds  Extremities: No peripheral edema   Neurological: Alert, strength and sensitivity are grossly intact  Skin: No obvious lesions/rashes  Psych: Mood & affect appropriate    LABS: All Labs Reviewed:                        11.4   11.53 )-----------( 339      ( 2021 07:39 )             34.9     2021 07:16    138    |  106    |  9      ----------------------------<  187    3.8     |  28     |  0.65   2021 21:24    138    |  104    |  11     ----------------------------<  180    4.0     |  29     |  0.84   2021 07:39    139    |  105    |  15     ----------------------------<  182    4.1     |  28     |  0.87     Ca    8.4        2021 07:16  Ca    8.7        2021 21:24  Ca    8.3        2021 07:39  Phos  2.1       2021 07:16  Phos  2.2       2021 21:24  Mg     2.1       2021 21:24            Blood Culture:         EKG:    RADIOLOGY:    CXR:   [facility]         Megan Ford, [place], [place], Mandy, Soto Jackson        [phone] (office)    Reason for Consult: 4 beats of V.Tach         HPI: Patient is a 65 year old female, POD day 2 s/p recurrent incisional hernia repair. She reports that she developed severe, 10/10 pain at the incisional site last night beginning around 6:30 pm and lasting about 3 hours.  Patient was noted to have 4 beats of v tach during the acute episode of pain. She reports that she was surprised by this, as she had no symptoms of  shortness of breath, chest pain, palpitations, dizziness, or presyncope/syncope. Patient complained only of diaphoresis during the episode. Her pain resolved after being given 3 Tylenol and has not recurred since.          PAST MEDICAL & SURGICAL HISTORY:  Type 2 diabetes mellitus    Incisional hernia without obstruction or gangrene    KATIUSKA (obstructive sleep apnea)  Pt does not use CPAP at night    Chronic GERD    Hyperlipidemia    Obesity    Hypertension    S/P exploratory laparotomy  with DANIEL and segmental SB resection, 3/2019    S/P hernia repair  &quot;Temporary ventral hernia surgery&quot; (1/5/15), Incisional hernia repair (3/2015)    Status post cholecystectomy  2015    S/P exploratory laparotomy  s/p tummy tuck for post op bleeding     S/P breast lumpectomy  (Benign) right breast 2000    Status post abdominoplasty  with hernia repair 2014    S/P total hysterectomy with removal of both tubes and ovaries  2013        SOCIAL HISTORY: No active tobacco, alcohol or illicit drug use    FAMILY HISTORY:  FH: cirrhosis  (Brother )    Family history of MS (multiple sclerosis)  (Father )    FH: CAD (coronary artery disease)  (Mother )        Home Medications:  aspirin 81 mg oral tablet: 1 tab(s) orally once a day (2021 06:30)  Jentadueto 2.5 mg-1000 mg oral tablet: 1 tab(s) orally 2 times a day (2021 06:30)  multivitamin:   once a day (2021 06:30)  omeprazole 10 mg oral delayed release capsule: 1 cap(s) orally once a day (2021 06:30)  simvastatin 10 mg oral tablet: 0.5 tab(s) orally once a day (at bedtime) (2021 06:30)  venlafaxine 37.5 mg oral capsule, extended release: 1 cap(s) orally once a day (2021 06:30)      MEDICATIONS  (STANDING):  acetaminophen   Tablet .. 975 milliGRAM(s) Oral every 6 hours  BUpivacaine liposome 1.3% Injectable (no eMAR) 20 milliLiter(s) Local Injection once  ceFAZolin   IVPB 2000 milliGRAM(s) IV Intermittent every 8 hours  dextrose 40% Gel 15 Gram(s) Oral once  dextrose 5%. 1000 milliLiter(s) (50 mL/Hr) IV Continuous <Continuous>  dextrose 5%. 1000 milliLiter(s) (100 mL/Hr) IV Continuous <Continuous>  dextrose 50% Injectable 25 Gram(s) IV Push once  dextrose 50% Injectable 12.5 Gram(s) IV Push once  dextrose 50% Injectable 25 Gram(s) IV Push once  enoxaparin Injectable 40 milliGRAM(s) SubCutaneous daily  glucagon  Injectable 1 milliGRAM(s) IntraMuscular once  insulin lispro (ADMELOG) corrective regimen sliding scale   SubCutaneous three times a day before meals  lactobacillus acidophilus 1 Tablet(s) Oral two times a day with meals  pantoprazole    Tablet 20 milliGRAM(s) Oral before breakfast  potassium phosphate / sodium phosphate Powder (PHOS-NaK) 1 Packet(s) Oral daily  simvastatin 5 milliGRAM(s) Oral at bedtime  venlafaxine XR. 37.5 milliGRAM(s) Oral daily    MEDICATIONS  (PRN):  ketorolac   Injectable 15 milliGRAM(s) IV Push every 6 hours PRN Moderate Pain (4 - 6)  traMADol 50 milliGRAM(s) Oral every 6 hours PRN Severe Pain (7 - 10)      Allergies    Vicodin (Other)    Intolerances    adhesives (Other)      REVIEW OF SYSTEMS:   General: Denies fevers, chills, fatigue, diaphoresis  HEENT: Denies Headache, eye pain, vision changes  Respiratory: Denies SOB, cough, wheezing  Cardio: Denies chest pain, palpitations,   GI: Denies Nausea, vomiting, diarrhea, constipation  MSK: Denies joint pain      VITAL SIGNS:   Vital Signs Last 24 Hrs  T(C): 36.7 (2021 05:09), Max: 36.7 (2021 12:49)  T(F): 98 (2021 05:09), Max: 98 (2021 12:49)  HR: 83 (2021 05:09) (61 - 88)  BP: 128/84 (2021 05:09) (119/75 - 128/84)  BP(mean): --  RR: 18 (2021 05:09) (18 - 20)  SpO2: 91% (2021 05:09) (91% - 98%)    I&O's Summary    2021 07:01  -  2021 07:00  --------------------------------------------------------  IN: 0 mL / OUT: 675 mL / NET: -675 mL        PHYSICAL EXAM:  Constitutional: NAD, well-developed  HEENT NC/AT, moist mucous membranes  Pulmonary: Non-labored, breath sounds are clear bilaterally, no wheezing, rales  Cardiovascular: +S1, S2, RRR, no murmur  Gastrointestinal: Soft, nontender, nondistended, normoactive bowel sounds  Extremities: No peripheral edema   Neurological: Alert, strength and sensitivity are grossly intact  Skin: Warm and dry     LABS: All Labs Reviewed:                        11.4   1153 )-----------( 339      ( 2021 07:39 )             34.9     2021 07:16    138    |  106    |  9      ----------------------------<  187    3.8     |  28     |  0.65   2021 21:24    138    |  104    |  11     ----------------------------<  180    4.0     |  29     |  0.84   2021 07:39    139    |  105    |  15     ----------------------------<  182    4.1     |  28     |  0.87     Ca    8.4        2021 07:16  Ca    8.7        2021 21:24  Ca    8.3        2021 07:39  Phos  2.1       2021 07:16  Phos  2.2       2021 21:24  Mg     2.1       2021 21:24            Blood Culture:         EKG: NSR, no acute changes    RADIOLOGY:    CXR:   [facility]         Megan Ford, [place], [place], Mandy, Soto Jackson        [phone] (office)    Reason for Consult: 4 beats of V.Tach         HPI: Patient is a 65 year old female, POD day 2 s/p recurrent incisional hernia repair. She reports that she developed severe, 10/10 pain at the incisional site last night beginning around 6:30 pm and lasting about 3 hours.  Patient was noted to have 4 beats of SVT during the acute episode of pain. She reports that she was surprised by this, as she had no symptoms of  shortness of breath, chest pain, palpitations, dizziness, or presyncope/syncope. Patient complained only of diaphoresis during the episode. Her pain resolved after being given 3 Tylenol and has not recurred since.          PAST MEDICAL & SURGICAL HISTORY:  Type 2 diabetes mellitus    Incisional hernia without obstruction or gangrene    KATIUSKA (obstructive sleep apnea)  Pt does not use CPAP at night    Chronic GERD    Hyperlipidemia    Obesity    Hypertension    S/P exploratory laparotomy  with DANIEL and segmental SB resection, 3/2019    S/P hernia repair  &quot;Temporary ventral hernia surgery&quot; (1/5/15), Incisional hernia repair (3/2015)    Status post cholecystectomy  2015    S/P exploratory laparotomy  s/p tummy tuck for post op bleeding     S/P breast lumpectomy  (Benign) right breast 2000    Status post abdominoplasty  with hernia repair 2014    S/P total hysterectomy with removal of both tubes and ovaries  2013        SOCIAL HISTORY: No active tobacco, alcohol or illicit drug use    FAMILY HISTORY:  FH: cirrhosis  (Brother )    Family history of MS (multiple sclerosis)  (Father )    FH: CAD (coronary artery disease)  (Mother )        Home Medications:  aspirin 81 mg oral tablet: 1 tab(s) orally once a day (2021 06:30)  Jentadueto 2.5 mg-1000 mg oral tablet: 1 tab(s) orally 2 times a day (2021 06:30)  multivitamin:   once a day (2021 06:30)  omeprazole 10 mg oral delayed release capsule: 1 cap(s) orally once a day (2021 06:30)  simvastatin 10 mg oral tablet: 0.5 tab(s) orally once a day (at bedtime) (2021 06:30)  venlafaxine 37.5 mg oral capsule, extended release: 1 cap(s) orally once a day (2021 06:30)      MEDICATIONS  (STANDING):  acetaminophen   Tablet .. 975 milliGRAM(s) Oral every 6 hours  BUpivacaine liposome 1.3% Injectable (no eMAR) 20 milliLiter(s) Local Injection once  ceFAZolin   IVPB 2000 milliGRAM(s) IV Intermittent every 8 hours  dextrose 40% Gel 15 Gram(s) Oral once  dextrose 5%. 1000 milliLiter(s) (50 mL/Hr) IV Continuous <Continuous>  dextrose 5%. 1000 milliLiter(s) (100 mL/Hr) IV Continuous <Continuous>  dextrose 50% Injectable 25 Gram(s) IV Push once  dextrose 50% Injectable 12.5 Gram(s) IV Push once  dextrose 50% Injectable 25 Gram(s) IV Push once  enoxaparin Injectable 40 milliGRAM(s) SubCutaneous daily  glucagon  Injectable 1 milliGRAM(s) IntraMuscular once  insulin lispro (ADMELOG) corrective regimen sliding scale   SubCutaneous three times a day before meals  lactobacillus acidophilus 1 Tablet(s) Oral two times a day with meals  pantoprazole    Tablet 20 milliGRAM(s) Oral before breakfast  potassium phosphate / sodium phosphate Powder (PHOS-NaK) 1 Packet(s) Oral daily  simvastatin 5 milliGRAM(s) Oral at bedtime  venlafaxine XR. 37.5 milliGRAM(s) Oral daily    MEDICATIONS  (PRN):  ketorolac   Injectable 15 milliGRAM(s) IV Push every 6 hours PRN Moderate Pain (4 - 6)  traMADol 50 milliGRAM(s) Oral every 6 hours PRN Severe Pain (7 - 10)      Allergies    Vicodin (Other)    Intolerances    adhesives (Other)      REVIEW OF SYSTEMS:   General: Denies fevers, chills, fatigue, diaphoresis  HEENT: Denies Headache, eye pain, vision changes  Respiratory: Denies SOB, cough, wheezing  Cardio: Denies chest pain, palpitations,   GI: Denies Nausea, vomiting, diarrhea, constipation  MSK: Denies joint pain      VITAL SIGNS:   Vital Signs Last 24 Hrs  T(C): 36.7 (2021 05:09), Max: 36.7 (2021 12:49)  T(F): 98 (2021 05:09), Max: 98 (2021 12:49)  HR: 83 (2021 05:09) (61 - 88)  BP: 128/84 (2021 05:09) (119/75 - 128/84)  BP(mean): --  RR: 18 (2021 05:09) (18 - 20)  SpO2: 91% (2021 05:09) (91% - 98%)    I&O's Summary    2021 07:01  -  2021 07:00  --------------------------------------------------------  IN: 0 mL / OUT: 675 mL / NET: -675 mL        PHYSICAL EXAM:  Constitutional: NAD, well-developed  HEENT NC/AT, moist mucous membranes  Pulmonary: Non-labored, breath sounds are clear bilaterally, no wheezing, rales  Cardiovascular: +S1, S2, RRR, no murmur  Gastrointestinal: Soft, nontender, nondistended, normoactive bowel sounds  Extremities: No peripheral edema   Neurological: Alert, strength and sensitivity are grossly intact  Skin: Warm and dry     LABS: All Labs Reviewed:                        11.4   1153 )-----------( 339      ( 2021 07:39 )             34.9     2021 07:16    138    |  106    |  9      ----------------------------<  187    3.8     |  28     |  0.65   2021 21:24    138    |  104    |  11     ----------------------------<  180    4.0     |  29     |  0.84   2021 07:39    139    |  105    |  15     ----------------------------<  182    4.1     |  28     |  0.87     Ca    8.4        2021 07:16  Ca    8.7        2021 21:24  Ca    8.3        2021 07:39  Phos  2.1       2021 07:16  Phos  2.2       2021 21:24  Mg     2.1       2021 21:24            Blood Culture:         EKG: NSR, no acute changes    RADIOLOGY:    CXR:

## 2021-01-13 NOTE — PROGRESS NOTE ADULT - ATTENDING COMMENTS
I have personally seen, examined, and participated in the care of this patient. I have reviewed all pertinent clinical information, including history, physical exam, plan, and the PA's note and agree except as noted.    Comfortable this morning, no chest pain.  No additional runs of SVT  Case discussed with Dr Laguerre, Cardiology.  No contraindications to discharge to home with out patient follow up.    Midline incision remains clean and dry, Jerry drains serosanguinous as expected.  Nursing care arrange to assist at home.  Pt tolerating diet and passing flatus.    Plan for discharge to home.  F/u with me in one week.    Postoperative instructions have been provided including avoidance of heavy lifting and strenuous activities in excess of 20-25 pounds for duration of 4-6 weeks. The patient may shower keeping the incisions clean, dry, covered as needed.  Continue abdominal binder for support.

## 2021-01-13 NOTE — DISCHARGE NOTE NURSING/CASE MANAGEMENT/SOCIAL WORK - PATIENT PORTAL LINK FT
You can access the FollowMyHealth Patient Portal offered by [facility] by registering at the following website: [email or web address]. By joining [facility]’s FollowMyHealth portal, you will also be able to view your health information using other applications (apps) compatible with our system.

## 2021-01-19 ENCOUNTER — APPOINTMENT (OUTPATIENT)
Dept: SURGERY | Facility: CLINIC | Age: 66
End: 2021-01-19
Payer: COMMERCIAL

## 2021-01-19 VITALS
BODY MASS INDEX: 36.64 KG/M2 | HEART RATE: 72 BPM | WEIGHT: 228 LBS | DIASTOLIC BLOOD PRESSURE: 82 MMHG | SYSTOLIC BLOOD PRESSURE: 126 MMHG | HEIGHT: 66 IN

## 2021-01-19 DIAGNOSIS — K43.2 INCISIONAL HERNIA W/OUT OBSTRUCTION OR GANGRENE: ICD-10-CM

## 2021-01-19 DIAGNOSIS — M62.08 SEPARATION OF MUSCLE (NONTRAUMATIC), OTHER SITE: ICD-10-CM

## 2021-01-19 PROCEDURE — 99024 POSTOP FOLLOW-UP VISIT: CPT

## 2021-01-20 NOTE — PHYSICAL EXAM
[Normal Breath Sounds] : Normal breath sounds [Normal Heart Sounds] : normal heart sounds [Normal Rate and Rhythm] : normal rate and rhythm [No Rash or Lesion] : No rash or lesion [Alert] : alert [Oriented to Person] : oriented to person [Oriented to Place] : oriented to place [Oriented to Time] : oriented to time [Calm] : calm [de-identified] : Healthy-appearing woman in no acute distress. [de-identified] : NCAT, PERRLA, EOMI. Oral and pharyngeal mucosa pink and moist [de-identified] : Supple, no masses, no lymphadenopathy, no jugular venous distention. [de-identified] : Soft, nontender nondistended, positive bowel sounds in all four quads.   No rebound or guarding.  Surgical scar midline, laparoscopic scars, and transverse lower abdominal scar consistent with surgical history.  Midline laparotomy scar well approximated and healing nicely without erythema, induration or fluctuance.  Delilah intact.  Jerry drains exiting bilateral lower quadrants through previous transverse panniculectomy incision. [de-identified] : FROM, strength equal bilaterally ambulating without difficulty.

## 2021-01-20 NOTE — ASSESSMENT
[FreeTextEntry1] : s/p Complex repair of abdominal wall, recurrent incision hernia with explantation of previously placed mesh, bilateral component separation with primary midline closure over mesh.\par Jerry drains now with minimal serosanguineous drainage.  Removed today in office.\par Staples remain intact.\par \par Abdomen remains soft with good Bowel sounds.  No tenderness, rebound or guarding.  \par Bowel function normal and tolerating regular diet.\par Continues to wear abdominal binder for support as instructed.\par \par Postoperative instructions have been provided including avoidance of heavy lifting and strenuous activities in excess of 20-25 pounds for duration of 4-6 weeks. The patient may shower keeping the incisions clean, dry, covered as needed.\par \par f/u in 2 weeks for staple removal.\par Local wound care instruction provided.\par \par

## 2021-01-20 NOTE — HISTORY OF PRESENT ILLNESS
[de-identified] : s/p Complex abdominal wall reconstruction, myocutaneous flaps for recurrent incisional hernia.\par

## 2021-02-02 ENCOUNTER — APPOINTMENT (OUTPATIENT)
Dept: SURGERY | Facility: CLINIC | Age: 66
End: 2021-02-02
Payer: COMMERCIAL

## 2021-02-02 PROCEDURE — 99024 POSTOP FOLLOW-UP VISIT: CPT

## 2021-02-08 NOTE — PHYSICAL EXAM
[Normal Breath Sounds] : Normal breath sounds [Normal Heart Sounds] : normal heart sounds [Normal Rate and Rhythm] : normal rate and rhythm [No Rash or Lesion] : No rash or lesion [Alert] : alert [Oriented to Place] : oriented to place [Oriented to Person] : oriented to person [Oriented to Time] : oriented to time [Calm] : calm [de-identified] : Healthy-appearing woman in no acute distress. [de-identified] : NCAT, PERRLA, EOMI. Oral and pharyngeal mucosa pink and moist [de-identified] : Supple, no masses, no lymphadenopathy, no jugular venous distention. [de-identified] : Soft, nontender nondistended, positive bowel sounds in all four quads.   No rebound or guarding.  Surgical scar midline, laparoscopic scars, and transverse lower abdominal scar consistent with surgical history.  Midline laparotomy scar well approximated and healing nicely without erythema, induration or fluctuance.  Delilah intact.  Jerry drains exiting bilateral lower quadrants through previous transverse panniculectomy incision. [de-identified] : FROM, strength equal bilaterally ambulating without difficulty.

## 2021-02-08 NOTE — ASSESSMENT
[FreeTextEntry1] : s/p Complex repair of abdominal wall, recurrent incision hernia with explantation of previously placed mesh, bilateral component separation with primary midline closure over mesh.\par \par Abdomen remains soft with good Bowel sounds.  No tenderness, rebound or guarding.  Small fluctuant mass in right mid abdomen, suspicious for seroma/liquified hematoma.  Needle aspirated with 18 gauge, 60 cc liquified hematoma aspirated.  Compressive dressing applied.\par Remaining staples removed.\par \par Bowel function normal and tolerating regular diet.\par Continues to wear abdominal binder for support as instructed.\par \par Postoperative instructions have been provided including avoidance of heavy lifting and strenuous activities in excess of 20-25 pounds for duration of 4-6 weeks. The patient may shower keeping the incisions clean, dry, covered as needed.\par \par f/u one week.\par \par

## 2021-02-08 NOTE — HISTORY OF PRESENT ILLNESS
[de-identified] : s/p Complex abdominal wall reconstruction, myocutaneous flaps for recurrent incisional hernia.\par

## 2021-02-09 ENCOUNTER — APPOINTMENT (OUTPATIENT)
Dept: SURGERY | Facility: CLINIC | Age: 66
End: 2021-02-09
Payer: COMMERCIAL

## 2021-02-09 VITALS
OXYGEN SATURATION: 98 % | HEART RATE: 78 BPM | HEIGHT: 66 IN | BODY MASS INDEX: 35.36 KG/M2 | WEIGHT: 220 LBS | TEMPERATURE: 96.1 F | SYSTOLIC BLOOD PRESSURE: 119 MMHG | DIASTOLIC BLOOD PRESSURE: 83 MMHG

## 2021-02-09 PROCEDURE — 99024 POSTOP FOLLOW-UP VISIT: CPT

## 2021-02-22 NOTE — PHYSICAL EXAM
[Normal Breath Sounds] : Normal breath sounds [Normal Heart Sounds] : normal heart sounds [Normal Rate and Rhythm] : normal rate and rhythm [No Rash or Lesion] : No rash or lesion [Alert] : alert [Oriented to Person] : oriented to person [Oriented to Place] : oriented to place [Oriented to Time] : oriented to time [Calm] : calm [de-identified] : Healthy-appearing woman in no acute distress. [de-identified] : NCAT, PERRLA, EOMI. Oral and pharyngeal mucosa pink and moist [de-identified] : Supple, no masses, no lymphadenopathy, no jugular venous distention. [de-identified] : Soft, nontender nondistended, positive bowel sounds in all four quads.   No rebound or guarding.  Surgical scar midline, laparoscopic scars, and transverse lower abdominal scar consistent with surgical history.  Midline laparotomy scar well approximated and healing nicely without erythema, induration or fluctuance.  Delilah intact.  Jerry drains exiting bilateral lower quadrants through previous transverse panniculectomy incision. [de-identified] : FROM, strength equal bilaterally ambulating without difficulty.

## 2021-02-22 NOTE — HISTORY OF PRESENT ILLNESS
[de-identified] : s/p Complex abdominal wall reconstruction, myocutaneous flaps for recurrent incisional hernia.\par

## 2021-02-22 NOTE — ASSESSMENT
[FreeTextEntry1] : s/p Complex repair of abdominal wall, recurrent incision hernia with explantation of previously placed mesh, bilateral component separation with primary midline closure over mesh.\par \par Abdomen remains soft with good Bowel sounds.  No tenderness, rebound or guarding.  Small fluctuant mass in right mid abdomen smaller than previous.  Compressive dressing applied.  \par \par Bowel function normal and tolerating regular diet.\par Continues to wear abdominal binder for support as instructed.\par \par Postoperative instructions have been provided including avoidance of heavy lifting and strenuous activities in excess of 20-25 pounds for duration of 4-6 weeks. The patient may shower keeping the incisions clean, dry, covered as needed.\par \par f/u one month.\par \par

## 2021-03-16 ENCOUNTER — APPOINTMENT (OUTPATIENT)
Dept: SURGERY | Facility: CLINIC | Age: 66
End: 2021-03-16
Payer: COMMERCIAL

## 2021-03-16 DIAGNOSIS — Z87.19 PERSONAL HISTORY OF OTHER DISEASES OF THE DIGESTIVE SYSTEM: ICD-10-CM

## 2021-03-16 PROCEDURE — 99024 POSTOP FOLLOW-UP VISIT: CPT

## 2021-03-16 NOTE — ASSESSMENT
[FreeTextEntry1] : Physical exam limited by telehealth platform.  Findings documented above by patient accounts.\par \par Appears to be doing well and recovering appropriately from extensive abdominal surgery.  Occasional pulling sensation in Mid to left upper abdomen.   Previous area of seroma seems smaller and is not bothersome.\par \par Activity concerns addressed.  Patient may begin easing into normal daily activities as tolerated.  may resume light exercise.  In another 2-3 weeks may resume strenuous activity and all exercise.\par \par f/u one month.

## 2021-03-16 NOTE — PHYSICAL EXAM
[Normal Breath Sounds] : Normal breath sounds [Normal Heart Sounds] : normal heart sounds [Normal Rate and Rhythm] : normal rate and rhythm [No Rash or Lesion] : No rash or lesion [Alert] : alert [Oriented to Person] : oriented to person [Oriented to Place] : oriented to place [Oriented to Time] : oriented to time [Calm] : calm [de-identified] : Healthy-appearing woman in no acute distress. [de-identified] : NCAT, PERRLA, EOMI. Oral and pharyngeal mucosa pink and moist [de-identified] : Supple, no masses, no lymphadenopathy, no jugular venous distention. [de-identified] : Soft, nontender nondistended, positive bowel sounds in all four quads.   No rebound or guarding.  Surgical scar midline, laparoscopic scars, and transverse lower abdominal scar consistent with surgical history.  Midline laparotomy scar well approximated and healing nicely without erythema, induration or fluctuance.  Delilah intact.  Jerry drains exiting bilateral lower quadrants through previous transverse panniculectomy incision. [de-identified] : FROM, strength equal bilaterally ambulating without difficulty.

## 2021-03-16 NOTE — HISTORY OF PRESENT ILLNESS
[Home] : at home, [unfilled] , at the time of the visit. [Medical Office: ([facility])___] : at the medical office located in  [Verbal consent obtained from patient] : the patient, [unfilled]

## 2021-04-06 ENCOUNTER — APPOINTMENT (OUTPATIENT)
Dept: DERMATOLOGY | Facility: CLINIC | Age: 66
End: 2021-04-06
Payer: COMMERCIAL

## 2021-04-06 ENCOUNTER — APPOINTMENT (OUTPATIENT)
Dept: SURGERY | Facility: CLINIC | Age: 66
End: 2021-04-06
Payer: COMMERCIAL

## 2021-04-06 VITALS
OXYGEN SATURATION: 95 % | BODY MASS INDEX: 35.2 KG/M2 | SYSTOLIC BLOOD PRESSURE: 148 MMHG | WEIGHT: 219 LBS | HEART RATE: 71 BPM | TEMPERATURE: 97.3 F | DIASTOLIC BLOOD PRESSURE: 94 MMHG | HEIGHT: 66 IN

## 2021-04-06 DIAGNOSIS — K43.2 INCISIONAL HERNIA W/OUT OBSTRUCTION OR GANGRENE: ICD-10-CM

## 2021-04-06 PROCEDURE — 99072 ADDL SUPL MATRL&STAF TM PHE: CPT

## 2021-04-06 PROCEDURE — 99024 POSTOP FOLLOW-UP VISIT: CPT

## 2021-04-06 PROCEDURE — 99213 OFFICE O/P EST LOW 20 MIN: CPT

## 2021-04-07 PROBLEM — K43.2 RECURRENT INCISIONAL HERNIA: Status: RESOLVED | Noted: 2020-11-03 | Resolved: 2021-01-20

## 2021-04-07 NOTE — PHYSICAL EXAM
[Normal Breath Sounds] : Normal breath sounds [Normal Heart Sounds] : normal heart sounds [Normal Rate and Rhythm] : normal rate and rhythm [No Rash or Lesion] : No rash or lesion [Alert] : alert [Oriented to Person] : oriented to person [Oriented to Place] : oriented to place [Oriented to Time] : oriented to time [Calm] : calm [de-identified] : Healthy-appearing woman in no acute distress. [de-identified] : NCAT, PERRLA, EOMI. Oral and pharyngeal mucosa pink and moist [de-identified] : Supple, no masses, no lymphadenopathy, no jugular venous distention. [de-identified] : Soft, nontender nondistended, positive bowel sounds in all four quads.   No rebound or guarding.  Surgical scar midline, laparoscopic scars, and transverse lower abdominal scar consistent with surgical history.  Midline laparotomy scar well approximated and healing nicely without erythema, induration or fluctuance.   [de-identified] : FROM, strength equal bilaterally ambulating without difficulty.

## 2021-04-07 NOTE — ASSESSMENT
[FreeTextEntry1] : Doing great.  Fullness in right abdomen (seroma) has resolved.\par No signs of recurrent hernia or masses.\par Tolerating regular diet.\par Continues to wear abdominal binder for support.  Continue to use as needed.\par \par May resume normal activity as tolerated.\par \par f/u PRN.

## 2021-09-14 ENCOUNTER — APPOINTMENT (OUTPATIENT)
Dept: SURGERY | Facility: CLINIC | Age: 66
End: 2021-09-14
Payer: COMMERCIAL

## 2021-09-14 PROCEDURE — XXXXX: CPT

## 2021-11-16 ENCOUNTER — APPOINTMENT (OUTPATIENT)
Dept: DERMATOLOGY | Facility: CLINIC | Age: 66
End: 2021-11-16
Payer: COMMERCIAL

## 2021-11-16 PROCEDURE — 99213 OFFICE O/P EST LOW 20 MIN: CPT

## 2022-03-25 ENCOUNTER — APPOINTMENT (OUTPATIENT)
Dept: DERMATOLOGY | Facility: CLINIC | Age: 67
End: 2022-03-25
Payer: COMMERCIAL

## 2022-03-25 PROCEDURE — 17110 DESTRUCTION B9 LES UP TO 14: CPT

## 2022-03-25 PROCEDURE — 99213 OFFICE O/P EST LOW 20 MIN: CPT | Mod: 25

## 2022-04-04 ENCOUNTER — APPOINTMENT (OUTPATIENT)
Dept: DERMATOLOGY | Facility: CLINIC | Age: 67
End: 2022-04-04
Payer: COMMERCIAL

## 2022-04-04 PROCEDURE — 99214 OFFICE O/P EST MOD 30 MIN: CPT | Mod: 24

## 2022-06-21 ENCOUNTER — APPOINTMENT (OUTPATIENT)
Dept: DERMATOLOGY | Facility: CLINIC | Age: 67
End: 2022-06-21
Payer: COMMERCIAL

## 2022-06-21 PROCEDURE — 99214 OFFICE O/P EST MOD 30 MIN: CPT

## 2022-09-26 NOTE — ED ADULT NURSE NOTE - BREATHING, MLM
H/o Graves Disease in 80s. Now with hypothyroidism secondary to treatment. Stable. Monitor. Spontaneous, unlabored and symmetrical

## 2022-10-25 ENCOUNTER — APPOINTMENT (OUTPATIENT)
Dept: DERMATOLOGY | Facility: CLINIC | Age: 67
End: 2022-10-25

## 2022-10-25 PROCEDURE — 99214 OFFICE O/P EST MOD 30 MIN: CPT

## 2022-11-29 ENCOUNTER — APPOINTMENT (OUTPATIENT)
Dept: DERMATOLOGY | Facility: CLINIC | Age: 67
End: 2022-11-29

## 2022-11-29 PROCEDURE — 99215 OFFICE O/P EST HI 40 MIN: CPT

## 2023-01-26 NOTE — DISCHARGE NOTE PROVIDER - NSDCCPCAREPLAN_GEN_ALL_CORE_FT
PRINCIPAL DISCHARGE DIAGNOSIS  Diagnosis: Recurrent ventral incisional hernia  Assessment and Plan of Treatment:        Skin normal color for race, warm, dry and intact. No evidence of rash.

## 2023-04-13 ENCOUNTER — APPOINTMENT (OUTPATIENT)
Dept: DERMATOLOGY | Facility: CLINIC | Age: 68
End: 2023-04-13
Payer: COMMERCIAL

## 2023-04-13 PROCEDURE — 99214 OFFICE O/P EST MOD 30 MIN: CPT

## 2023-07-25 ENCOUNTER — APPOINTMENT (OUTPATIENT)
Dept: DERMATOLOGY | Facility: CLINIC | Age: 68
End: 2023-07-25
Payer: COMMERCIAL

## 2023-07-25 PROCEDURE — 99213 OFFICE O/P EST LOW 20 MIN: CPT

## 2023-10-26 ENCOUNTER — APPOINTMENT (OUTPATIENT)
Dept: DERMATOLOGY | Facility: CLINIC | Age: 68
End: 2023-10-26
Payer: COMMERCIAL

## 2023-10-26 PROCEDURE — 99215 OFFICE O/P EST HI 40 MIN: CPT

## 2023-11-03 ENCOUNTER — APPOINTMENT (OUTPATIENT)
Dept: DERMATOLOGY | Facility: CLINIC | Age: 68
End: 2023-11-03

## 2023-11-19 NOTE — CHART NOTE - NSCHARTNOTESELECT_GEN_ALL_CORE
Event Note
75 y/o F with Hx of breast CA and lumbar spinal fusion 1 month ago presents with progressively worsening area of swelling and fluctuance over surgical site. On exam, Pt has non tender fluctuance over L-spine with well healing midline surgical scar. Suspect post operative seroma. Plan for CT imagining to r/o infection. Discussed importance of follow up with surgeon and plastic surgery follow up [provider who did post op closure] for further management.

## 2024-01-17 ENCOUNTER — APPOINTMENT (OUTPATIENT)
Dept: DERMATOLOGY | Facility: CLINIC | Age: 69
End: 2024-01-17
Payer: COMMERCIAL

## 2024-01-17 PROCEDURE — 99214 OFFICE O/P EST MOD 30 MIN: CPT | Mod: 25

## 2024-01-17 PROCEDURE — 17000 DESTRUCT PREMALG LESION: CPT

## 2024-07-31 ENCOUNTER — APPOINTMENT (OUTPATIENT)
Dept: DERMATOLOGY | Facility: CLINIC | Age: 69
End: 2024-07-31

## 2024-09-03 ENCOUNTER — APPOINTMENT (OUTPATIENT)
Dept: DERMATOLOGY | Facility: CLINIC | Age: 69
End: 2024-09-03
Payer: COMMERCIAL

## 2024-09-03 PROCEDURE — 99214 OFFICE O/P EST MOD 30 MIN: CPT

## 2024-11-18 ENCOUNTER — APPOINTMENT (OUTPATIENT)
Dept: DERMATOLOGY | Facility: CLINIC | Age: 69
End: 2024-11-18
Payer: COMMERCIAL

## 2024-11-18 PROCEDURE — 99213 OFFICE O/P EST LOW 20 MIN: CPT

## 2025-01-07 ENCOUNTER — APPOINTMENT (OUTPATIENT)
Dept: DERMATOLOGY | Facility: CLINIC | Age: 70
End: 2025-01-07
Payer: COMMERCIAL

## 2025-01-07 PROCEDURE — 99214 OFFICE O/P EST MOD 30 MIN: CPT

## 2025-02-28 NOTE — DIETITIAN INITIAL EVALUATION ADULT. - EST PROTEIN NEEDS5
Fortunately your workup in the emergency department today was reassuring.  No concerning abnormalities on your blood work.  Continue to monitor symptoms you develop significantly worsening abdominal pain, recurrent vomiting, blood in your stools or other concerning symptoms please return to the emergency department for repeat evaluation.  
[age]

## 2025-04-10 ENCOUNTER — APPOINTMENT (OUTPATIENT)
Dept: DERMATOLOGY | Facility: CLINIC | Age: 70
End: 2025-04-10
Payer: COMMERCIAL

## 2025-04-10 PROCEDURE — 17110 DESTRUCTION B9 LES UP TO 14: CPT

## 2025-04-10 PROCEDURE — 99214 OFFICE O/P EST MOD 30 MIN: CPT | Mod: 25

## 2025-07-08 NOTE — PATIENT PROFILE ADULT - DEAF OR HARD OF HEARING?
[facility]  [facility]  Progress Note    SUBJECTIVE:  No chief complaint on file.    The patient still in the ICU.  She remains intubated and sedated.  Remains afebrile.  Off pressors.    Review of systems:  As stated above in the chief complaint, otherwise negative.    Medications:  Scheduled Meds:   [START ON 2025] vancomycin (VANCOCIN) 1,250 mg in sodium chloride 0.9 % 250 mL IVPB  1,250 mg IntraVENous Q24H    midodrine  5 mg Oral q8h    pantoprazole  40 mg IntraVENous Daily    chlorhexidine  15 mL Mouth/Throat BID    heparin (porcine)  5,000 Units SubCUTAneous 3 times per day    clindamycin (CLEOCIN) IV  900 mg IntraVENous Q8H    meropenem  1,000 mg IntraVENous Q8H    sodium chloride flush  5-40 mL IntraVENous 2 times per day    [Held by provider] lisinopril  10 mg Oral Daily    [Held by provider] metoprolol succinate  25 mg Oral Daily    [Held by provider] apixaban  5 mg Oral BID     Continuous Infusions:   fentaNYL 75 mcg/hr (25 1317)    lactated ringers 100 mL/hr at 25 1249    sodium chloride       PRN Meds:sulfur hexafluoride microspheres, fentaNYL **AND** fentaNYL, sodium chloride flush, sodium chloride, potassium chloride **OR** potassium alternative oral replacement **OR** potassium chloride, magnesium sulfate, ondansetron **OR** ondansetron, polyethylene glycol, acetaminophen **OR** acetaminophen    OBJECTIVE:  /71   Pulse (!) 118   Temp 97.7 °F (36.5 °C) (Axillary)   Resp 16   Ht 1.651 m (5' 5\")   Wt 125.7 kg (277 lb 1.9 oz)   SpO2 100%   BMI 46.11 kg/m²   Temp  Av.5 °F (36.9 °C)  Min: 97.7 °F (36.5 °C)  Max: 98.9 °F (37.2 °C)  Constitutional: The patient is lying in bed in the ICU.  She is intubated and sedated.  Skin: Warm and dry. No rashes were noted.   HEENT: Round and reactive pupils.  Moist mucous membranes.  No ulcerations or thrush.  Neck: Supple to movements.   Chest: No use of accessory muscles to breathe. Symmetrical  no